# Patient Record
Sex: FEMALE | Race: BLACK OR AFRICAN AMERICAN | NOT HISPANIC OR LATINO | Employment: FULL TIME | URBAN - METROPOLITAN AREA
[De-identification: names, ages, dates, MRNs, and addresses within clinical notes are randomized per-mention and may not be internally consistent; named-entity substitution may affect disease eponyms.]

---

## 2017-01-05 ENCOUNTER — APPOINTMENT (OUTPATIENT)
Dept: RADIOLOGY | Facility: MEDICAL CENTER | Age: 22
End: 2017-01-05
Attending: NURSE PRACTITIONER
Payer: COMMERCIAL

## 2017-01-18 ENCOUNTER — APPOINTMENT (OUTPATIENT)
Dept: RADIOLOGY | Facility: MEDICAL CENTER | Age: 22
End: 2017-01-18
Attending: NURSE PRACTITIONER
Payer: COMMERCIAL

## 2017-03-29 ENCOUNTER — OFFICE VISIT (OUTPATIENT)
Dept: MEDICAL GROUP | Facility: CLINIC | Age: 22
End: 2017-03-29
Payer: COMMERCIAL

## 2017-03-29 VITALS
DIASTOLIC BLOOD PRESSURE: 68 MMHG | HEART RATE: 86 BPM | TEMPERATURE: 99 F | SYSTOLIC BLOOD PRESSURE: 108 MMHG | RESPIRATION RATE: 16 BRPM | OXYGEN SATURATION: 91 % | HEIGHT: 64 IN | BODY MASS INDEX: 44.05 KG/M2 | WEIGHT: 258 LBS

## 2017-03-29 DIAGNOSIS — J06.9 UPPER RESPIRATORY TRACT INFECTION, UNSPECIFIED TYPE: ICD-10-CM

## 2017-03-29 DIAGNOSIS — J40 BRONCHITIS: ICD-10-CM

## 2017-03-29 DIAGNOSIS — J45.21 MILD INTERMITTENT ASTHMA WITH ACUTE EXACERBATION: ICD-10-CM

## 2017-03-29 PROCEDURE — 99213 OFFICE O/P EST LOW 20 MIN: CPT | Performed by: NURSE PRACTITIONER

## 2017-03-29 RX ORDER — FLUTICASONE PROPIONATE 50 MCG
2 SPRAY, SUSPENSION (ML) NASAL DAILY
Qty: 16 G | Refills: 1 | Status: SHIPPED | OUTPATIENT
Start: 2017-03-29 | End: 2017-08-16

## 2017-03-29 RX ORDER — PROMETHAZINE HYDROCHLORIDE AND CODEINE PHOSPHATE 6.25; 1 MG/5ML; MG/5ML
5 SYRUP ORAL NIGHTLY PRN
Qty: 120 ML | Refills: 0 | Status: SHIPPED | OUTPATIENT
Start: 2017-03-29 | End: 2017-05-30

## 2017-03-29 RX ORDER — AZITHROMYCIN 250 MG/1
TABLET, FILM COATED ORAL
Qty: 6 TAB | Refills: 0 | Status: SHIPPED | OUTPATIENT
Start: 2017-03-29 | End: 2017-06-08

## 2017-03-29 RX ORDER — ALBUTEROL SULFATE 90 UG/1
2 AEROSOL, METERED RESPIRATORY (INHALATION) EVERY 6 HOURS PRN
Qty: 8.5 G | Refills: 0 | Status: SHIPPED | OUTPATIENT
Start: 2017-03-29 | End: 2017-08-16

## 2017-03-29 ASSESSMENT — ENCOUNTER SYMPTOMS
SHORTNESS OF BREATH: 1
CHILLS: 1
COUGH: 1
MYALGIAS: 1
SORE THROAT: 1
WHEEZING: 1
NAUSEA: 1
VOMITING: 0
FEVER: 1
SPUTUM PRODUCTION: 0

## 2017-03-29 NOTE — MR AVS SNAPSHOT
"        Mary Carmen Sanchez   3/29/2017 11:00 AM   Office Visit   MRN: [id number]    Department:  [facility]   Dept Phone:  [phone]    Description:  Female : 1995   Provider:  KRISTA Ortiz           Reason for Visit     URI chills/ fever/ non- productive cough/ vomiting/ BRB with phlem/ x 4 days       Allergies as of 3/29/2017     No Known Allergies      You were diagnosed with     Upper respiratory tract infection, unspecified type   [6459282]       Mild intermittent asthma with acute exacerbation   [820017]       Bronchitis   [079848]         Vital Signs     Blood Pressure Pulse Temperature Respirations Height Weight    108/68 mmHg 86 37.2 °C (99 °F) 16 1.626 m (5' 4\") 117.028 kg (258 lb)    Body Mass Index Oxygen Saturation Smoking Status             44.26 kg/m2 91% Never Smoker          Basic Information     Date Of Birth Sex Race Ethnicity Preferred Language    1995 Female Black or  Non- English      Problem List              ICD-10-CM Priority Class Noted - Resolved    Depression F32.9   2016 - Present    Pain at surgical site G89.18   2016 - Present    Mid back pain M54.9   2016 - Present    Epidermal cyst L72.0   2016 - Present    Obesity, morbid, BMI 40.0-49.9 (CMS-HCC) E66.01   2016 - Present      Health Maintenance        Date Due Completion Dates    IMM VARICELLA (CHICKENPOX) VACCINE (1 of 2 - 2 Dose Adolescent Series) 2009 ---    IMM HPV VACCINE (2 of 3 - Female 3 Dose Series) 2/3/2012 2011    IMM MENINGOCOCCAL VACCINE (MCV4) (2 of 2) 2/3/2012 2011    IMM INFLUENZA (1) 2016 ---    PAP SMEAR 2017 ---    IMM DTaP/Tdap/Td Vaccine (2 - Td) 2025            Current Immunizations     HPV 9-VALENT VACCINE (GARDASIL 9) 2011    Hepatitis A Vaccine, Ped/Adol 2/3/2003, 2002    Hepatitis B Vaccine Non-Recombivax (Ped/Adol) 2000, 9/3/1999, 1995    Meningococcal Conjugate Vaccine " MCV4 (Menactra) 12/9/2011    Tdap Vaccine 4/1/2015  7:09 AM      Below and/or attached are the medications your provider expects you to take. Review all of your home medications and newly ordered medications with your provider and/or pharmacist. Follow medication instructions as directed by your provider and/or pharmacist. Please keep your medication list with you and share with your provider. Update the information when medications are discontinued, doses are changed, or new medications (including over-the-counter products) are added; and carry medication information at all times in the event of emergency situations     Allergies:  No Known Allergies          Medications  Valid as of: March 29, 2017 - 11:30 AM    Generic Name Brand Name Tablet Size Instructions for use    Albuterol Sulfate (Aero Soln) albuterol 108 (90 BASE) MCG/ACT Inhale 2 Puffs by mouth every 6 hours as needed.        Azithromycin (Tab) ZITHROMAX 250 MG As directed        Fluticasone Propionate (Suspension) FLONASE 50 MCG/ACT Spray 2 Sprays in nose every day.        Polyethylene Glycol 3350 (Pack) MIRALAX  Take 1 Packet by mouth every day.        Promethazine-Codeine (Syrup) PHENERGAN-CODEINE 6.25-10 MG/5ML Take 5 mL by mouth at bedtime as needed for Cough.        .                 Medicines prescribed today were sent to:     [facility] [address] [place] [address] [address] [facility] [address]    [address] [facility] [address]    Phone: [phone] Fax: [phone]    Open 24 Hours?: No      Medication refill instructions:       If your prescription bottle indicates you have medication refills left, it is not necessary to call your provider’s office. Please contact your pharmacy and they will refill your medication.    If your prescription bottle indicates you do not have any refills left, you may request refills at any time through one of the following ways: The online [employer] system (except Urgent Care), by calling  your provider’s office, or by asking your pharmacy to contact your provider’s office with a refill request. Medication refills are processed only during regular business hours and may not be available until the next business day. Your provider may request additional information or to have a follow-up visit with you prior to refilling your medication.   *Please Note: Medication refills are assigned a new Rx number when refilled electronically. Your pharmacy may indicate that no refills were authorized even though a new prescription for the same medication is available at the pharmacy. Please request the medicine by name with the pharmacy before contacting your provider for a refill.           MyChart Status: Patient Declined

## 2017-03-29 NOTE — PROGRESS NOTES
Chief Complaint   Patient presents with   • URI     chills/ fever/ non- productive cough/ vomiting/ BRB with phlem/ x 4 days        HISTORY OF PRESENT ILLNESS: Patient is a 22 y.o. female established patient who presents today due to 4 days hx of URI symptoms. Pt reports that she started having sore throat then coughing, mostly dry coughing at this time. She has fever last night up to 102. She feels nausea due to severe coughing. She also feels sob, wheezing and earache and muscle ache. She is taking ibuprofen or tylenol for headache secondary to severe coughing and nyquil at night time to help her sleep. However, none of them seems to help her too much. Pt reports that she has hx of asthma but she does not get asthma attack very often. Last time might be years ago. She is not smoking.      Patient Active Problem List    Diagnosis Date Noted   • Obesity, morbid, BMI 40.0-49.9 (CMS-[facility]) 12/28/2016   • Epidermal cyst 09/20/2016   • Depression 09/06/2016   • Pain at surgical site 09/06/2016   • Mid back pain 09/06/2016       Allergies:Review of patient's allergies indicates no known allergies.    Current Outpatient Prescriptions   Medication Sig Dispense Refill   • fluticasone (FLONASE) 50 MCG/ACT nasal spray Spray 2 Sprays in nose every day. 16 g 1   • nitrofurantoin macro crystals (MACRODANTIN) 100 MG Cap Take 1 Cap by mouth 2 Times a Day. 10 Cap 0   • promethazine-codeine (PHENERGAN-CODEINE) 6.25-10 MG/5ML Syrup Take 5 mL by mouth 4 times a day as needed for Cough. 120 mL 0   • phenazopyridine (PYRIDIUM) 200 MG Tab Take 1 Tab by mouth 3 times a day as needed. 9 Tab 0   • polyethylene glycol/lytes (MIRALAX) Pack Take 1 Packet by mouth every day. 7 Each 0     No current facility-administered medications for this visit.       Social History   Substance Use Topics   • Smoking status: Never Smoker    • Smokeless tobacco: Never Used   • Alcohol Use: No       Family Status   Relation Status Death Age   • Daughter Alive   "  • Daughter Alive      Family History   Problem Relation Age of Onset   • Diabetes Father    • Diabetes Paternal Uncle    • Cancer Maternal Grandfather    • No Known Problems Daughter    • No Known Problems Daughter          ROS:  Review of Systems   Constitutional: Positive for fever and chills.   HENT: Positive for congestion, ear pain and sore throat.    Respiratory: Positive for cough ( mostly dry cough), shortness of breath and wheezing. Negative for sputum production.    Gastrointestinal: Positive for nausea ( from severe coughing). Negative for vomiting.   Musculoskeletal: Positive for myalgias.        Objective:     Blood pressure 108/68, pulse 86, temperature 37.2 °C (99 °F), resp. rate 16, height 1.626 m (5' 4\"), weight 117.028 kg (258 lb), SpO2 91 %, unknown if currently breastfeeding.     Physical Exam:  Physical Exam   Constitutional: She is oriented to person, place, and time and well-developed, well-nourished, and in no distress.   HENT:   Head: Normocephalic and atraumatic.   Right Ear: External ear normal. Tympanic membrane is bulging.   Left Ear: External ear normal. Tympanic membrane is bulging.   Nose: Rhinorrhea present. Right sinus exhibits frontal sinus tenderness. Right sinus exhibits no maxillary sinus tenderness. Left sinus exhibits frontal sinus tenderness. Left sinus exhibits no maxillary sinus tenderness.   Mouth/Throat: Oropharynx is clear and moist. No oropharyngeal exudate, posterior oropharyngeal edema, posterior oropharyngeal erythema or tonsillar abscesses.   Eyes: Conjunctivae are normal.   Neck: Neck supple. No JVD present.   Cardiovascular: Normal rate.    Pulmonary/Chest: Effort normal. No respiratory distress. She has wheezes. She has no rales.   Musculoskeletal: Normal range of motion. She exhibits no edema.   Neurological: She is alert and oriented to person, place, and time.   Skin: Skin is warm. No erythema.   Vitals reviewed.        Assessment/Plan:  1. Upper respiratory " tract infection, unspecified type  - fluticasone (FLONASE) 50 MCG/ACT nasal spray; Spray 2 Sprays in nose every day.  Dispense: 16 g; Refill: 1  - promethazine-codeine (PHENERGAN-CODEINE) 6.25-10 MG/5ML Syrup; Take 5 mL by mouth at bedtime as needed for Cough.  Dispense: 120 mL; Refill: 0  - azithromycin (ZITHROMAX) 250 MG Tab; As directed  Dispense: 6 Tab; Refill: 0    2. Mild intermittent asthma with acute exacerbation  - albuterol 108 (90 BASE) MCG/ACT Aero Soln inhalation aerosol; Inhale 2 Puffs by mouth every 6 hours as needed.  Dispense: 8.5 g; Refill: 0    3. Bronchitis  - promethazine-codeine (PHENERGAN-CODEINE) 6.25-10 MG/5ML Syrup; Take 5 mL by mouth at bedtime as needed for Cough.  Dispense: 120 mL; Refill: 0  - azithromycin (ZITHROMAX) 250 MG Tab; As directed  Dispense: 6 Tab; Refill: 0  - albuterol 108 (90 BASE) MCG/ACT Aero Soln inhalation aerosol; Inhale 2 Puffs by mouth every 6 hours as needed.  Dispense: 8.5 g; Refill: 0

## 2017-04-05 ENCOUNTER — APPOINTMENT (OUTPATIENT)
Dept: RADIOLOGY | Facility: MEDICAL CENTER | Age: 22
End: 2017-04-05
Attending: EMERGENCY MEDICINE
Payer: COMMERCIAL

## 2017-04-05 ENCOUNTER — HOSPITAL ENCOUNTER (EMERGENCY)
Facility: MEDICAL CENTER | Age: 22
End: 2017-04-05
Attending: EMERGENCY MEDICINE
Payer: COMMERCIAL

## 2017-04-05 ENCOUNTER — APPOINTMENT (OUTPATIENT)
Dept: RADIOLOGY | Facility: MEDICAL CENTER | Age: 22
End: 2017-04-05
Payer: COMMERCIAL

## 2017-04-05 VITALS
OXYGEN SATURATION: 97 % | SYSTOLIC BLOOD PRESSURE: 120 MMHG | RESPIRATION RATE: 18 BRPM | DIASTOLIC BLOOD PRESSURE: 85 MMHG | TEMPERATURE: 97 F | BODY MASS INDEX: 43.41 KG/M2 | HEART RATE: 96 BPM | WEIGHT: 253 LBS

## 2017-04-05 DIAGNOSIS — S93.401A SPRAIN OF RIGHT ANKLE, UNSPECIFIED LIGAMENT, INITIAL ENCOUNTER: ICD-10-CM

## 2017-04-05 PROCEDURE — 99284 EMERGENCY DEPT VISIT MOD MDM: CPT

## 2017-04-05 PROCEDURE — 700102 HCHG RX REV CODE 250 W/ 637 OVERRIDE(OP): Performed by: EMERGENCY MEDICINE

## 2017-04-05 PROCEDURE — A9270 NON-COVERED ITEM OR SERVICE: HCPCS | Performed by: EMERGENCY MEDICINE

## 2017-04-05 PROCEDURE — 73610 X-RAY EXAM OF ANKLE: CPT | Mod: RT

## 2017-04-05 PROCEDURE — 73630 X-RAY EXAM OF FOOT: CPT | Mod: RT

## 2017-04-05 RX ORDER — IBUPROFEN 600 MG/1
600 TABLET ORAL ONCE
Status: COMPLETED | OUTPATIENT
Start: 2017-04-05 | End: 2017-04-05

## 2017-04-05 RX ORDER — HYDROCODONE BITARTRATE AND ACETAMINOPHEN 5; 325 MG/1; MG/1
1-2 TABLET ORAL EVERY 6 HOURS PRN
Qty: 20 TAB | Refills: 0 | Status: SHIPPED | OUTPATIENT
Start: 2017-04-05 | End: 2017-05-30

## 2017-04-05 RX ORDER — HYDROCODONE BITARTRATE AND ACETAMINOPHEN 5; 325 MG/1; MG/1
1 TABLET ORAL ONCE
Status: COMPLETED | OUTPATIENT
Start: 2017-04-05 | End: 2017-04-05

## 2017-04-05 RX ADMIN — HYDROCODONE BITARTRATE AND ACETAMINOPHEN 1 TABLET: 5; 325 TABLET ORAL at 18:58

## 2017-04-05 RX ADMIN — IBUPROFEN 600 MG: 600 TABLET, FILM COATED ORAL at 18:58

## 2017-04-05 ASSESSMENT — PAIN SCALES - GENERAL: PAINLEVEL_OUTOF10: 8

## 2017-04-05 NOTE — ED AVS SNAPSHOT
4/5/2017          Mary Carmen Sanchez  [address]   [place] [address]    Dear Mary Carmen:    [facility] wants to ensure your discharge home is safe and you or your loved ones have had all your questions answered regarding your care after you leave the hospital.    You may receive a telephone call within two days of your discharge.  This call is to make certain you understand your discharge instructions as well as ensure we provided you with the best care possible during your stay with us.     The call will only last approximately 3-5 minutes and will be done by a nurse.    Once again, we want to ensure your discharge home is safe and that you have a clear understanding of any next steps in your care.  If you have any questions or concerns, please do not hesitate to contact us, we are here for you.  Thank you for choosing [facility] for your healthcare needs.    Sincerely,    Barry Black    [facility]

## 2017-04-05 NOTE — ED AVS SNAPSHOT
[employer] Works Access Code: Activation code not generated  Current [employer] Works Status: Patient Declined    Yield[employer]arZeptor  A secure, online tool to manage your health information     [employer]’s [employer]® is a secure, online tool that connects you to your personalized health information from the privacy of your home -- day or night - making it very easy for you to manage your healthcare. Once the activation process is completed, you can even access your medical information using the Entertainment [employer] Works amish, which is available for free in the Apple Amish store or Google Play store.     Enter[employer] Works provides the following levels of access (as shown below):   My Chart Features   [facility] Primary Care Doctor [facility]  Specialists [facility]  Urgent  Care Non-[facility]  Primary Care  Doctor   Email your healthcare team securely and privately 24/7 X X X X   Manage appointments: schedule your next appointment; view details of past/upcoming appointments X      Request prescription refills. X      View recent personal medical records, including lab and immunizations X X X X   View health record, including health history, allergies, medications X X X X   Read reports about your outpatient visits, procedures, consult and ER notes X X X X   See your discharge summary, which is a recap of your hospital and/or ER visit that includes your diagnosis, lab results, and care plan. X X       How to register for [employer] Works:  1. Go to  [email or web address].  2. Click on the Sign Up Now box, which takes you to the New Member Sign Up page. You will need to provide the following information:  a. Enter your [employer] Works Access Code exactly as it appears at the top of this page. (You will not need to use this code after you’ve completed the sign-up process. If you do not sign up before the expiration date, you must request a new code.)   b. Enter your date of birth.   c. Enter your home email address.   d. Click Submit, and follow the next screen’s instructions.  3. Create a [employer] Works ID.  This will be your MIGSIF login ID and cannot be changed, so think of one that is secure and easy to remember.  4. Create a MIGSIF password. You can change your password at any time.  5. Enter your Password Reset Question and Answer. This can be used at a later time if you forget your password.   6. Enter your e-mail address. This allows you to receive e-mail notifications when new information is available in MIGSIF.  7. Click Sign Up. You can now view your health information.    For assistance activating your MIGSIF account, call [phone]

## 2017-04-06 NOTE — DISCHARGE INSTRUCTIONS
Please follow-up with your primary care provider for blood pressure management.      Ankle Sprain  An ankle sprain is an injury to the strong, fibrous tissues (ligaments) that hold your ankle bones together.   HOME CARE   · Put ice on your ankle for 1-2 days or as told by your doctor.  ¨ Put ice in a plastic bag.  ¨ Place a towel between your skin and the bag.  ¨ Leave the ice on for 15-20 minutes at a time, every 2 hours while you are awake.  · Only take medicine as told by your doctor.  · Raise (elevate) your injured ankle above the level of your heart as much as possible for 2-3 days.  · Use crutches if your doctor tells you to. Slowly put your own weight on the affected ankle. Use the crutches until you can walk without pain.  · If you have a plaster splint:  ¨ Do not rest it on anything harder than a pillow for 24 hours.  ¨ Do not put weight on it.  ¨ Do not get it wet.  ¨ Take it off to shower or bathe.  · If given, use an elastic wrap or support stocking for support. Take the wrap off if your toes lose feeling (numb), tingle, or turn cold or blue.  · If you have an air splint:  ¨ Add or let out air to make it comfortable.  ¨ Take it off at night and to shower and bathe.  ¨ Wiggle your toes and move your ankle up and down often while you are wearing it.  GET HELP IF:  · You have rapidly increasing bruising or puffiness (swelling).  · Your toes feel very cold.  · You lose feeling in your foot.  · Your medicine does not help your pain.  GET HELP RIGHT AWAY IF:   · Your toes lose feeling (numb) or turn blue.  · You have severe pain that is increasing.  MAKE SURE YOU:   · Understand these instructions.  · Will watch your condition.  · Will get help right away if you are not doing well or get worse.     This information is not intended to replace advice given to you by your health care provider. Make sure you discuss any questions you have with your health care provider.     Document Released: 06/05/2009 Document  Revised: 01/08/2016 Document Reviewed: 07/01/2013  Elsevier Interactive Patient Education ©2016 Elsevier Inc.

## 2017-04-06 NOTE — ED PROVIDER NOTES
ED Provider Note    CHIEF COMPLAINT   Chief Complaint   Patient presents with   • Ankle Injury     Right, stepped off stairs wrong       HPI   Mary Carmen Sanchez is a 22 y.o. female who presents to the ED secondary to right ankle pain. The patient states that she twisted her ankle after coming downstairs. She has had severe pain over lateral aspect of her ankle, she felt a pop. She did not hit her head, no other injuries. She denies any chance of pregnancy.    REVIEW OF SYSTEMS   See HPI for further details.     PAST MEDICAL HISTORY   Past Medical History   Diagnosis Date   • Anxiety    • Depression        FAMILY HISTORY  Family History   Problem Relation Age of Onset   • Diabetes Father    • Diabetes Paternal Uncle    • Cancer Maternal Grandfather    • No Known Problems Daughter    • No Known Problems Daughter        SOCIAL HISTORY  Social History     Social History   • Marital Status: Single     Spouse Name: N/A   • Number of Children: N/A   • Years of Education: N/A     Social History Main Topics   • Smoking status: Never Smoker    • Smokeless tobacco: Never Used   • Alcohol Use: No   • Drug Use: No   • Sexual Activity:     Partners: Male     Birth Control/ Protection: IUD     Other Topics Concern   • None     Social History Narrative       SURGICAL HISTORY  Past Surgical History   Procedure Laterality Date   • Tonsillectomy  2000   • Cervical cerclage  2/20/2015     Performed by Quinn Grimaldo M.D. at [facility]   • Other       wisdom teeth   • Primary c section  3/28/2015     Performed by Lucy Valdez M.D. at [facility]       CURRENT MEDICATIONS   Home Medications     **Home medications have not yet been reviewed for this encounter**          ALLERGIES   No Known Allergies    PHYSICAL EXAM  VITAL SIGNS: /60 mmHg  Pulse 81  Temp(Src) 37.2 °C (99 °F)  Resp 20  Wt 114.76 kg (253 lb)  SpO2 97%  Constitutional: Well developed, Well nourished, mild distress, Non-toxic appearance.    HENT:  Atraumatic, Normocephalic, Oral pharynx with moist mucous membranes.   Eyes: EOMI, PERRL  Cardiovascular: Good Pulses  Thorax & Lungs: No respiratory distress  Abdomen: Soft nontender   Skin: Warm, Dry, No erythema, No rash.   Musculoskeletal: There is palpation soft tissue swelling over lateral aspect of the right ankle, 2+ pulse, normal sensation distally  Neurologic: Alert & oriented x 3,     RADIOLOGY/PROCEDURES  DX-FOOT-COMPLETE 3+ RIGHT   Final Result      Unremarkable RIGHT foot.      DX-ANKLE 3+ VIEWS RIGHT   Final Result      1.  Mild diffuse RIGHT ankle swelling.   2.  No fracture or dislocation.            COURSE & MEDICAL DECISION MAKING  Pertinent Labs & Imaging studies reviewed. (See chart for details)  Patient with ankle sprain, we'll put air splint, crutches, Norco for pain, follow-up with orthopedics, Dr. Sellers, return with worsening symptoms.    FINAL IMPRESSION  1. Sprain of right ankle, unspecified ligament, initial encounter        Patient referred to primary care provider for blood pressure management     This dictation was created using voice recognition software. The accuracy of the dictation is limited to the abilities of the software. I expect there may be some errors of grammar and possibly content. The nursing notes were reviewed and certain aspects of this information were incorporated into this note.    Electronically signed by: Sandip Joyce, 4/5/2017 6:51 PM

## 2017-04-06 NOTE — ED NOTES
Chief Complaint   Patient presents with   • Ankle Injury     Right, stepped off stairs wrong     /60 mmHg  Pulse 81  Temp(Src) 37.2 °C (99 °F)  Resp 20  Wt 114.76 kg (253 lb)  SpO2 97%

## 2017-04-06 NOTE — ED NOTES
X-ray results noted-presumably from triage. Pt med per order along with crackers. Milk per pt request

## 2017-05-25 ENCOUNTER — TELEPHONE (OUTPATIENT)
Dept: MEDICAL GROUP | Facility: PHYSICIAN GROUP | Age: 22
End: 2017-05-25

## 2017-05-30 ENCOUNTER — OFFICE VISIT (OUTPATIENT)
Dept: URGENT CARE | Facility: PHYSICIAN GROUP | Age: 22
End: 2017-05-30
Payer: COMMERCIAL

## 2017-05-30 VITALS
RESPIRATION RATE: 16 BRPM | OXYGEN SATURATION: 98 % | TEMPERATURE: 98.1 F | SYSTOLIC BLOOD PRESSURE: 124 MMHG | HEART RATE: 96 BPM | DIASTOLIC BLOOD PRESSURE: 76 MMHG

## 2017-05-30 DIAGNOSIS — R05.9 COUGH: ICD-10-CM

## 2017-05-30 DIAGNOSIS — J01.00 ACUTE NON-RECURRENT MAXILLARY SINUSITIS: ICD-10-CM

## 2017-05-30 PROCEDURE — 99214 OFFICE O/P EST MOD 30 MIN: CPT | Performed by: PHYSICIAN ASSISTANT

## 2017-05-30 RX ORDER — DOXYCYCLINE HYCLATE 100 MG
100 TABLET ORAL 2 TIMES DAILY
Qty: 20 TAB | Refills: 0 | Status: SHIPPED | OUTPATIENT
Start: 2017-05-30 | End: 2017-06-29

## 2017-05-30 RX ORDER — CODEINE PHOSPHATE AND GUAIFENESIN 10; 100 MG/5ML; MG/5ML
SOLUTION ORAL
Qty: 100 ML | Refills: 0 | Status: SHIPPED | OUTPATIENT
Start: 2017-05-30 | End: 2017-06-08

## 2017-05-30 RX ORDER — METHYLPREDNISOLONE 4 MG/1
TABLET ORAL
Qty: 21 TAB | Refills: 0 | Status: SHIPPED | OUTPATIENT
Start: 2017-05-30 | End: 2017-06-08

## 2017-05-30 NOTE — MR AVS SNAPSHOT
Mary Carmen Sanchez   2017 9:35 AM   Office Visit   MRN: [id number]    Department:  [facility]   Dept Phone:  [phone]    Description:  Female : 1995   Provider:  Melida Kimbrough PA-C           Reason for Visit     Cough 2 weeks      Allergies as of 2017     No Known Allergies      You were diagnosed with     Acute non-recurrent maxillary sinusitis   [9023918]       Cough   [786.2.ICD-9-CM]         Vital Signs     Blood Pressure Pulse Temperature Respirations Oxygen Saturation Smoking Status    124/76 mmHg 96 36.7 °C (98.1 °F) 16 98% Never Smoker       Basic Information     Date Of Birth Sex Race Ethnicity Preferred Language    1995 Female Black or  Non- English      Your appointments     2017 11:40 AM   ANNUAL EXAM PREVENTATIVE with KRISTA Narvaez   [facility]    [address] [place] [id number] [phone]              Problem List              ICD-10-CM Priority Class Noted - Resolved    Depression F32.9   2016 - Present    Pain at surgical site G89.18   2016 - Present    Mid back pain M54.9   2016 - Present    Epidermal cyst L72.0   2016 - Present    Obesity, morbid, BMI 40.0-49.9 (CMS-HCC) E66.01   2016 - Present      Health Maintenance        Date Due Completion Dates    IMM VARICELLA (CHICKENPOX) VACCINE (1 of 2 - 2 Dose Adolescent Series) 2009 ---    IMM HPV VACCINE (2 of 3 - Female 3 Dose Series) 2/3/2012 2011    IMM MENINGOCOCCAL VACCINE (MCV4) (2 of 2) 2/3/2012 2011    PAP SMEAR 2017 ---    IMM DTaP/Tdap/Td Vaccine (2 - Td) 2025            Current Immunizations     HPV 9-VALENT VACCINE (GARDASIL 9) 2011    Hepatitis A Vaccine, Ped/Adol 2/3/2003, 2002    Hepatitis B Vaccine Non-Recombivax (Ped/Adol) 2000, 9/3/1999, 1996    Meningococcal Conjugate Vaccine MCV4 (Menactra) 2011    Tdap Vaccine 2015  7:09 AM      Below  and/or attached are the medications your provider expects you to take. Review all of your home medications and newly ordered medications with your provider and/or pharmacist. Follow medication instructions as directed by your provider and/or pharmacist. Please keep your medication list with you and share with your provider. Update the information when medications are discontinued, doses are changed, or new medications (including over-the-counter products) are added; and carry medication information at all times in the event of emergency situations     Allergies:  No Known Allergies          Medications  Valid as of: May 30, 2017 - 12:34 PM    Generic Name Brand Name Tablet Size Instructions for use    Albuterol Sulfate (Aero Soln) albuterol 108 (90 BASE) MCG/ACT Inhale 2 Puffs by mouth every 6 hours as needed.        Azithromycin (Tab) ZITHROMAX 250 MG As directed        Doxycycline Hyclate (Tab) VIBRAMYCIN 100 MG Take 1 Tab by mouth 2 times a day.        Fluticasone Propionate (Suspension) FLONASE 50 MCG/ACT Spray 2 Sprays in nose every day.        Guaifenesin-Codeine (Solution) ROBITUSSIN -10 mg/5mL 1-2 teaspoons at bedtime prn cough. No driving        MethylPREDNISolone (Tablet Therapy Pack) MEDROL DOSEPAK 4 MG Take as directed        Polyethylene Glycol 3350 (Pack) MIRALAX  Take 1 Packet by mouth every day.        .                 Medicines prescribed today were sent to:     [id number] - [place] - [address]    [address] [place] [address]    Phone: [phone] Fax: [phone]    Open 24 Hours?: No      Medication refill instructions:       If your prescription bottle indicates you have medication refills left, it is not necessary to call your provider’s office. Please contact your pharmacy and they will refill your medication.    If your prescription bottle indicates you do not have any refills left, you may request refills at any time through one of the following ways: The  online [employer] system (except Urgent Care), by calling your provider’s office, or by asking your pharmacy to contact your provider’s office with a refill request. Medication refills are processed only during regular business hours and may not be available until the next business day. Your provider may request additional information or to have a follow-up visit with you prior to refilling your medication.   *Please Note: Medication refills are assigned a new Rx number when refilled electronically. Your pharmacy may indicate that no refills were authorized even though a new prescription for the same medication is available at the pharmacy. Please request the medicine by name with the pharmacy before contacting your provider for a refill.           [employer] Access Code: [email or web address]  Expires: 6/17/2017  9:18 AM    [employer]  A secure, online tool to manage your health information     [employer]’s [employer]® is a secure, online tool that connects you to your personalized health information from the privacy of your home -- day or night - making it very easy for you to manage your healthcare. Once the activation process is completed, you can even access your medical information using the [employer] amish, which is available for free in the Apple Amish store or Google Play store.     [employer] provides the following levels of access (as shown below):   My Chart Features   [facility] Doctor [facility]  Specialists [facility]  Urgent  Care Non-[facility]  Primary Care  Doctor   Email your healthcare team securely and privately 24/7 X X X    Manage appointments: schedule your next appointment; view details of past/upcoming appointments X      Request prescription refills. X      View recent personal medical records, including lab and immunizations X X X X   View health record, including health history, allergies, medications X X X X   Read reports about your outpatient visits, procedures, consult and ER notes X X X X   See your  discharge summary, which is a recap of your hospital and/or ER visit that includes your diagnosis, lab results, and care plan. X X       How to register for [employer]:  1. Go to  [email or web address].  2. Click on the Sign Up Now box, which takes you to the New Member Sign Up page. You will need to provide the following information:  a. Enter your [employer] Access Code exactly as it appears at the top of this page. (You will not need to use this code after you’ve completed the sign-up process. If you do not sign up before the expiration date, you must request a new code.)   b. Enter your date of birth.   c. Enter your home email address.   d. Click Submit, and follow the next screen’s instructions.  3. Create a [employer] M[employer] ID. This will be your [employer] login ID and cannot be changed, so think of one that is secure and easy to remember.  4. Create a [employer] password. You can change your password at any time.  5. Enter your Password Reset Question and Answer. This can be used at a later time if you forget your password.   6. Enter your e-mail address. This allows you to receive e-mail notifications when new information is available in Asset Mapping.  7. Click Sign Up. You can now view your health information.    For assistance activating your [employer] M[employer] account, call [phone]

## 2017-06-08 ENCOUNTER — HOSPITAL ENCOUNTER (OUTPATIENT)
Facility: MEDICAL CENTER | Age: 22
End: 2017-06-08
Attending: NURSE PRACTITIONER
Payer: COMMERCIAL

## 2017-06-08 ENCOUNTER — OFFICE VISIT (OUTPATIENT)
Dept: MEDICAL GROUP | Facility: PHYSICIAN GROUP | Age: 22
End: 2017-06-08
Payer: COMMERCIAL

## 2017-06-08 VITALS
BODY MASS INDEX: 43.71 KG/M2 | HEART RATE: 79 BPM | SYSTOLIC BLOOD PRESSURE: 84 MMHG | HEIGHT: 64 IN | WEIGHT: 256 LBS | TEMPERATURE: 97 F | DIASTOLIC BLOOD PRESSURE: 56 MMHG | OXYGEN SATURATION: 95 % | RESPIRATION RATE: 12 BRPM

## 2017-06-08 DIAGNOSIS — F32.9 REACTIVE DEPRESSION: ICD-10-CM

## 2017-06-08 DIAGNOSIS — Z11.51 ENCOUNTER FOR SCREENING FOR HUMAN PAPILLOMAVIRUS (HPV): ICD-10-CM

## 2017-06-08 DIAGNOSIS — N89.8 VAGINAL DISCHARGE: ICD-10-CM

## 2017-06-08 DIAGNOSIS — Z01.419 WELL FEMALE EXAM WITH ROUTINE GYNECOLOGICAL EXAM: ICD-10-CM

## 2017-06-08 DIAGNOSIS — E66.01 OBESITY, MORBID, BMI 40.0-49.9 (HCC): ICD-10-CM

## 2017-06-08 DIAGNOSIS — Z12.4 SCREENING FOR MALIGNANT NEOPLASM OF CERVIX: ICD-10-CM

## 2017-06-08 DIAGNOSIS — Z11.3 SCREEN FOR STD (SEXUALLY TRANSMITTED DISEASE): ICD-10-CM

## 2017-06-08 PROCEDURE — 88175 CYTOPATH C/V AUTO FLUID REDO: CPT

## 2017-06-08 PROCEDURE — 87660 TRICHOMONAS VAGIN DIR PROBE: CPT

## 2017-06-08 PROCEDURE — 87510 GARDNER VAG DNA DIR PROBE: CPT

## 2017-06-08 PROCEDURE — 99395 PREV VISIT EST AGE 18-39: CPT | Performed by: NURSE PRACTITIONER

## 2017-06-08 PROCEDURE — 87480 CANDIDA DNA DIR PROBE: CPT

## 2017-06-08 PROCEDURE — 87591 N.GONORRHOEAE DNA AMP PROB: CPT

## 2017-06-08 PROCEDURE — 87491 CHLMYD TRACH DNA AMP PROBE: CPT

## 2017-06-08 PROCEDURE — 87624 HPV HI-RISK TYP POOLED RSLT: CPT

## 2017-06-08 RX ORDER — HYDROCODONE BITARTRATE AND ACETAMINOPHEN 5; 325 MG/1; MG/1
TABLET ORAL
Refills: 0 | COMMUNITY
Start: 2017-04-05 | End: 2017-06-29

## 2017-06-08 RX ORDER — CITALOPRAM HYDROBROMIDE 10 MG/1
10 TABLET ORAL DAILY
Qty: 30 TAB | Refills: 0 | Status: SHIPPED | OUTPATIENT
Start: 2017-06-08 | End: 2017-06-29

## 2017-06-08 ASSESSMENT — PATIENT HEALTH QUESTIONNAIRE - PHQ9
8. MOVING OR SPEAKING SO SLOWLY THAT OTHER PEOPLE COULD HAVE NOTICED. OR THE OPPOSITE, BEING SO FIGETY OR RESTLESS THAT YOU HAVE BEEN MOVING AROUND A LOT MORE THAN USUAL: 3
SUM OF ALL RESPONSES TO PHQ9 QUESTIONS 1 AND 2: 6
5. POOR APPETITE OR OVEREATING: 3
7. TROUBLE CONCENTRATING ON THINGS, SUCH AS READING THE NEWSPAPER OR WATCHING TELEVISION: 3
SUM OF ALL RESPONSES TO PHQ QUESTIONS 1-9: 25
4. FEELING TIRED OR HAVING LITTLE ENERGY: 3
3. TROUBLE FALLING OR STAYING ASLEEP OR SLEEPING TOO MUCH: 3
2. FEELING DOWN, DEPRESSED, IRRITABLE, OR HOPELESS: 3
6. FEELING BAD ABOUT YOURSELF - OR THAT YOU ARE A FAILURE OR HAVE LET YOURSELF OR YOUR FAMILY DOWN: 3
9. THOUGHTS THAT YOU WOULD BE BETTER OFF DEAD, OR OF HURTING YOURSELF: 1
1. LITTLE INTEREST OR PLEASURE IN DOING THINGS: 3

## 2017-06-08 NOTE — MR AVS SNAPSHOT
"        Mary Carmen Sanchez   2017 11:40 AM   Office Visit   MRN: [id number]    Department:  [facility]   Dept Phone:  [phone]    Description:  Female : 1995   Provider:  KRISTA Narvaez           Reason for Visit     Annual Exam           Allergies as of 2017     No Known Allergies      You were diagnosed with     Well female exam with routine gynecological exam   [270479]       Screening for malignant neoplasm of cervix   [003693]       Encounter for screening for human papillomavirus (HPV)   [008174]       Reactive depression   [021090]       Obesity, morbid, BMI 40.0-49.9 (CMS-[facility])   [080594]       Screen for STD (sexually transmitted disease)   [549759]         Vital Signs     Blood Pressure Pulse Temperature Respirations Height Weight    84/56 mmHg 79 36.1 °C (97 °F) 12 1.626 m (5' 4.02\") 116.121 kg (256 lb)    Body Mass Index Oxygen Saturation Smoking Status             43.92 kg/m2 95% Never Smoker          Basic Information     Date Of Birth Sex Race Ethnicity Preferred Language    1995 Female Black or  Non- English      Problem List              ICD-10-CM Priority Class Noted - Resolved    Depression F32.9   2016 - Present    Pain at surgical site G89.18   2016 - Present    Mid back pain M54.9   2016 - Present    Epidermal cyst L72.0   2016 - Present    Obesity, morbid, BMI 40.0-49.9 (CMS-[facility]) E66.01   2016 - Present      Health Maintenance        Date Due Completion Dates    IMM VARICELLA (CHICKENPOX) VACCINE (1 of 2 - 2 Dose Adolescent Series) 2009 ---    IMM HPV VACCINE (2 of 3 - Female 3 Dose Series) 2/3/2012 2011    IMM MENINGOCOCCAL VACCINE (MCV4) (2 of 2) 2/3/2012 2011    PAP SMEAR 2017 ---    IMM DTaP/Tdap/Td Vaccine (2 - Td) 2025            Current Immunizations     HPV 9-VALENT VACCINE (GARDASIL 9) 2011    Hepatitis A Vaccine, Ped/Adol 2/3/2003, 2002    Hepatitis B Vaccine " Non-Recombivax (Ped/Adol) 5/11/2000, 9/3/1999, 1996    Meningococcal Conjugate Vaccine MCV4 (Menactra) 12/9/2011    Tdap Vaccine 4/1/2015  7:09 AM      Below and/or attached are the medications your provider expects you to take. Review all of your home medications and newly ordered medications with your provider and/or pharmacist. Follow medication instructions as directed by your provider and/or pharmacist. Please keep your medication list with you and share with your provider. Update the information when medications are discontinued, doses are changed, or new medications (including over-the-counter products) are added; and carry medication information at all times in the event of emergency situations     Allergies:  No Known Allergies          Medications  Valid as of: June 08, 2017 - 12:48 PM    Generic Name Brand Name Tablet Size Instructions for use    Albuterol Sulfate (Aero Soln) albuterol 108 (90 BASE) MCG/ACT Inhale 2 Puffs by mouth every 6 hours as needed.        Citalopram Hydrobromide (Tab) CELEXA 10 MG Take 1 Tab by mouth every day.        Doxycycline Hyclate (Tab) VIBRAMYCIN 100 MG Take 1 Tab by mouth 2 times a day.        Fluticasone Propionate (Suspension) FLONASE 50 MCG/ACT Spray 2 Sprays in nose every day.        Hydrocodone-Acetaminophen (Tab) NORCO 5-325 MG TAKE 1 TO 2 TABLETS ORALLY EVERY 6 HOURS AS NEEDED        Polyethylene Glycol 3350 (Pack) MIRALAX  Take 1 Packet by mouth every day.        .                 Medicines prescribed today were sent to:     [facility][id number] - [place] - [address]    [address] [place] [address]    Phone: [phone] Fax: [phone]    Open 24 Hours?: No      Medication refill instructions:       If your prescription bottle indicates you have medication refills left, it is not necessary to call your provider’s office. Please contact your pharmacy and they will refill your medication.    If your prescription bottle indicates you do not  have any refills left, you may request refills at any time through one of the following ways: The online [employer] system (except Urgent Care), by calling your provider’s office, or by asking your pharmacy to contact your provider’s office with a refill request. Medication refills are processed only during regular business hours and may not be available until the next business day. Your provider may request additional information or to have a follow-up visit with you prior to refilling your medication.   *Please Note: Medication refills are assigned a new Rx number when refilled electronically. Your pharmacy may indicate that no refills were authorized even though a new prescription for the same medication is available at the pharmacy. Please request the medicine by name with the pharmacy before contacting your provider for a refill.        Your To Do List     Future Labs/Procedures Complete By Expires    HIV ANTIBODIES  As directed 6/8/2018         [employer] Access Code: [email or web address]  Expires: 6/17/2017  9:18 AM    [employer]  A secure, online tool to manage your health information     [employer]’s [employer]® is a secure, online tool that connects you to your personalized health information from the privacy of your home -- day or night - making it very easy for you to manage your healthcare. Once the activation process is completed, you can even access your medical information using the [employer] amish, which is available for free in the Apple Amish store or Google Play store.     [employer] provides the following levels of access (as shown below):   My Chart Features   [facility] Doctor [facility]  Specialists [facility]  Urgent  Care Non-[facility]  Primary Care  Doctor   Email your healthcare team securely and privately 24/7 X X X    Manage appointments: schedule your next appointment; view details of past/upcoming appointments X      Request prescription refills. X      View recent personal medical records, including lab  and immunizations X X X X   View health record, including health history, allergies, medications X X X X   Read reports about your outpatient visits, procedures, consult and ER notes X X X X   See your discharge summary, which is a recap of your hospital and/or ER visit that includes your diagnosis, lab results, and care plan. X X       How to register for PENRITH:  1. Go to  [email or web address].  2. Click on the Sign Up Now box, which takes you to the New Member Sign Up page. You will need to provide the following information:  a. Enter your PENRITH Access Code exactly as it appears at the top of this page. (You will not need to use this code after you’ve completed the sign-up process. If you do not sign up before the expiration date, you must request a new code.)   b. Enter your date of birth.   c. Enter your home email address.   d. Click Submit, and follow the next screen’s instructions.  3. Create a PENRITH ID. This will be your PENRITH login ID and cannot be changed, so think of one that is secure and easy to remember.  4. Create a PENRITH password. You can change your password at any time.  5. Enter your Password Reset Question and Answer. This can be used at a later time if you forget your password.   6. Enter your e-mail address. This allows you to receive e-mail notifications when new information is available in PENRITH.  7. Click Sign Up. You can now view your health information.    For assistance activating your PENRITH account, call [phone]

## 2017-06-08 NOTE — Clinical Note
June 8, 2017          Dear Mary Carmen:    I have started you on citalopram a medication to help with your mood. In 10-21 days, you may see improvement in sleep, appetite, and daytime energy levels. In 6 weeks, you should start to see improvement in mood and emotion control.  In the first 1-2 weeks you may experience flu-like symptoms of nausea, headache, diarrhea. This is your body's response to becoming used to the change in serotonin levels. These symptoms most often subside after 1-2 weeks. If you develop severe diarrhea, vomiting, or severe headache, please let me know.  It is very important to not discontinue the medication in the initial 6 weeks. If you do not see improvement in 6 weeks, we can discuss increasing the dose or changing the medication.   The length of time we will treat you with this will be for a period of 6 months beyond the point of symptomatic treatment.   Antidepressants are not addictive. They do not cause tolerance; you will not require higher and higher doses once an adequate dose for you is determined.   You should not drink alcohol while taking antidepressants. It can block the effects of the medication, making them less effective. Alcohol is also a depression, this does not help your recovery from depression.   Exercise is a synergistic effect. It makes your antidepressant more effective. A healthy diet, with moderate carbohydrate intake, and minimal caffeine, and eating every 4-6 hours, will augment your efforts to treat depression.  Good sleep hygiene will help as well, going to bed at a regular time, and sleeping 8 hours a night. You may try things such as yoga, meditation, pilates, or other cardiovascular exercise to help.   Do not stop taking this medication cold turkey. If you have a desire to stop, please contact me so we can plan a taper to decrease to avoid symptoms.   ER warning signs: If you experience any suicidal thoughts, call 9-1-1 or go to ER immediately.        If you  have any questions or concerns, please don't hesitate to call.        Sincerely,        DEVIN Narvaez.    Electronically Signed

## 2017-06-08 NOTE — PROGRESS NOTES
SUBJECTIVE: 22 y.o. female for annual routine gynecologic exam  Chief Complaint   Patient presents with   • Annual Exam     Reactive Depression   This is an issue that has been ongoing over this past year, but has never really been addressed. I have seen patient for this diagnosis on 2 separate occasions and referred her to psychology, but nothing has come up this. Apparently there was a triggering incident making this more of a reactive depression. She is very interested in both pharmacological treatment and psychology referral. She has seen Radha with psych once in the past, but she Radha left for another position. Patient is eager for treatment. She does not exercise nor eat healthy. Does have supportive mother.     DIANE 7 Score: 18   Patient Health Questionaire    Little interest or pleasure in doing things?: 3   Feeling down, depressed, or hopeless?: 3  Trouble falling or staying asleep, or sleeping too much? : 3  Feeling tired or having little energy? : 3  Poor appetite or overeating? : 3  Feeling bad about yourself - or that you are a failure or have let yourself or your family down? : 3  Trouble concentrating on things, such as reading the newspaper or watching television? : 3  Moving or speaking so slowly that other people could have noticed.  Or the opposite - being so fidgety or restless that you have been moving around alot more than usual. : 3  Thoughts that you would be better off dead, or of hurting yourself?: 1  Patient Health Questionnaire Score: 25      Interpretation of PHQ-9 Total Score   Score Severity   20-27 Severe Depression        Obstetric History       T1      TAB0   SAB0   E0   M0   L2       Last Pap: never   Contraception use: IUD  History   Sexual Activity   • Sexual Activity:   • Partners: Male   • Birth Control/ Protection: IUD     Sexual history: currently sexually active, single partner   H/O Abnormal Pap no  She  reports that she has never smoked. She has never  used smokeless tobacco.    LMP Date:  (pt has IUD, not having periods)   Allergies: Review of patient's allergies indicates no known allergies.     ROS:    Menses not occurring d/t IUD  No significant bloating/fluid retention, pelvic pain, or dyspareunia. + large vaginal discharge   No breast tenderness, mass, nipple discharge, changes in size or contour, or abnormal cyclic discomfort.  No urinary tract symptoms, no incontinence.   No abdominal pain, change in bowel habits, black or bloody stools.    +migraine headaches behind eyes. No unusual headaches, no visual changes  No prolonged cough. No dyspnea or chest pain on exertion.  No depression, labile mood, anxiety, libido changes, insomnia.  No polydipsia, polyuria, temperature intolerance.  No new/concerning skin lesions, concerns.     Exercise: no regular exercise program.   24 hour diet recall: reports eating occasionally, but when she does eat, she eats very large portions     Current medicines (including changes today)  Current Outpatient Prescriptions   Medication Sig Dispense Refill   • citalopram (CELEXA) 10 MG tablet Take 1 Tab by mouth every day. 30 Tab 0   • doxycycline (VIBRAMYCIN) 100 MG Tab Take 1 Tab by mouth 2 times a day. 20 Tab 0   • hydrocodone-acetaminophen (NORCO) 5-325 MG Tab per tablet TAKE 1 TO 2 TABLETS ORALLY EVERY 6 HOURS AS NEEDED  0   • fluticasone (FLONASE) 50 MCG/ACT nasal spray Spray 2 Sprays in nose every day. 16 g 1   • albuterol 108 (90 BASE) MCG/ACT Aero Soln inhalation aerosol Inhale 2 Puffs by mouth every 6 hours as needed. 8.5 g 0   • polyethylene glycol/lytes (MIRALAX) Pack Take 1 Packet by mouth every day. 7 Each 0     No current facility-administered medications for this visit.     She  has a past medical history of Anxiety and Depression. She also has no past medical history of Asthma.  She  has past surgical history that includes tonsillectomy (2000); cervical cerclage (2/20/2015); other; and primary c section  "(3/28/2015).     Family History:   Family History   Problem Relation Age of Onset   • Diabetes Father    • Diabetes Paternal Uncle    • Cancer Maternal Grandfather    • No Known Problems Daughter    • No Known Problems Daughter           OBJECTIVE:   BP 84/56 mmHg  Pulse 79  Temp(Src) 36.1 °C (97 °F)  Resp 12  Ht 1.626 m (5' 4.02\")  Wt 116.121 kg (256 lb)  BMI 43.92 kg/m2  SpO2 95%  Body mass index is 43.92 kg/(m^2).    HEAD AND NECK:  Ears normal.  Throat, oral cavity and tongue normal.  Neck supple. No adenopathy or masses in the neck or supraclavicular regions.  No carotid bruits. No thyromegaly. NEURO: Cranial nerves are normal. CHEST:  Clear, good air entry, no wheezes or rales. HEART:  Regular rate and rhythm.  S1 and S2 normal. No edema or JVD. ABDOMEN:  Soft without tenderness, guarding, mass or organomegaly.  No CVA tenderness or inguinal adenopathy. EXTREMITIES:  Extremities and peripheral pulses are normal. SKIN: color normal, vascularity normal, no edema, temperature normal   No rashes or suspicious skin lesions noted.     Breast Exam: Performed with instruction during examination. No axillary lymphadenopathy, no skin changes, no dominant masses. No nipple retraction  Pelvic Exam -  Normal external genitalia with no lesions. Normal vaginal mucosa with normal rugation and white, thin discharge. Cervix with no visible lesions. No cervical motion tenderness. Uterus is normal sized with no masses. No IUD string present. No adnexal tenderness or enlargement appreciated. Thin Prep Pap is obtained, vaginal swab is obtained and specimen(s) sent to lab  Rectal: deferred      <ASSESSMENT and PLAN>  1. Well female exam with routine gynecological exam  THINPREP PAP WITH HPV   2. Screening for malignant neoplasm of cervix  THINPREP PAP WITH HPV   3. Encounter for screening for human papillomavirus (HPV)  THINPREP PAP WITH HPV   4. Reactive depression  Uncontrolled. Start citalopram 10 mg daily. F/u 3 weeks to " increase dose. Refer back to psych for psychotherapy. Handout given to patient for education on both non-pharmacological and synergistic tx, as well as medication information and response expectations.    citalopram (CELEXA) 10 MG tablet   5. Vaginal discharge  VAGINAL PATHOGENS DNA PANEL   6. Obesity, morbid, BMI 40.0-49.9 (CMS-[facility])  Patient identified as having weight management issue.  Appropriate orders and counseling given.    Enc small meals frequently, high vegetable intake, limit sugar. Exercise 5 days a week 30 minutes a day    7. Screen for STD (sexually transmitted disease)  HIV ANTIBODIES    TREPONEMA PALLIDUM ANTIBODIES    CHLAMYDIA/GC PCR URINE OR SWAB       Discussed  breast self exam, STD prevention, adequate intake of calcium and vitamin D, diet and exercise   Follow-up in 1 years for next Gyn exam and 3 years for next Pap.   Next office visit for recheck of chronic medical conditions is due in 3 weeks

## 2017-06-09 DIAGNOSIS — Z11.51 ENCOUNTER FOR SCREENING FOR HUMAN PAPILLOMAVIRUS (HPV): ICD-10-CM

## 2017-06-09 DIAGNOSIS — Z01.419 WELL FEMALE EXAM WITH ROUTINE GYNECOLOGICAL EXAM: ICD-10-CM

## 2017-06-09 DIAGNOSIS — Z12.4 SCREENING FOR MALIGNANT NEOPLASM OF CERVIX: ICD-10-CM

## 2017-06-09 LAB
CANDIDA DNA VAG QL PROBE+SIG AMP: POSITIVE
G VAGINALIS DNA VAG QL PROBE+SIG AMP: NEGATIVE
T VAGINALIS DNA VAG QL PROBE+SIG AMP: NEGATIVE

## 2017-06-10 LAB
C TRACH DNA SPEC QL NAA+PROBE: NEGATIVE
CYTOLOGY REG CYTOL: ABNORMAL
HPV HR 12 DNA CVX QL NAA+PROBE: POSITIVE
HPV16 DNA SPEC QL NAA+PROBE: NEGATIVE
HPV18 DNA SPEC QL NAA+PROBE: NEGATIVE
N GONORRHOEA DNA SPEC QL NAA+PROBE: NEGATIVE
SPECIMEN SOURCE: ABNORMAL
SPECIMEN SOURCE: NORMAL

## 2017-06-11 DIAGNOSIS — B97.7 HIGH RISK HPV INFECTION: ICD-10-CM

## 2017-06-11 RX ORDER — FLUCONAZOLE 150 MG/1
150 TABLET ORAL DAILY
Qty: 1 TAB | Refills: 0 | Status: SHIPPED | OUTPATIENT
Start: 2017-06-11 | End: 2017-06-29

## 2017-06-12 ENCOUNTER — TELEPHONE (OUTPATIENT)
Dept: MEDICAL GROUP | Facility: PHYSICIAN GROUP | Age: 22
End: 2017-06-12

## 2017-06-12 NOTE — TELEPHONE ENCOUNTER
----- Message from KRISTA Narvaez sent at 6/11/2017  3:47 PM PDT -----  Please advise patient that results were negative for gonorrhea, chlamydia, or bacterial infection. She does have a yeast infection; one time dose of diflucan has been sent to pharmacy for her to take. She additionally tested positive for a high risk HPV virus. We will discuss this more when I see her in a few weeks.     KRISTA Narvaez

## 2017-06-29 ENCOUNTER — OFFICE VISIT (OUTPATIENT)
Dept: MEDICAL GROUP | Facility: PHYSICIAN GROUP | Age: 22
End: 2017-06-29
Payer: COMMERCIAL

## 2017-06-29 VITALS
HEIGHT: 64 IN | OXYGEN SATURATION: 94 % | HEART RATE: 88 BPM | WEIGHT: 250 LBS | RESPIRATION RATE: 12 BRPM | BODY MASS INDEX: 42.68 KG/M2 | DIASTOLIC BLOOD PRESSURE: 68 MMHG | TEMPERATURE: 96.7 F | SYSTOLIC BLOOD PRESSURE: 102 MMHG

## 2017-06-29 DIAGNOSIS — F32.9 REACTIVE DEPRESSION: ICD-10-CM

## 2017-06-29 DIAGNOSIS — E66.01 OBESITY, MORBID, BMI 40.0-49.9 (HCC): ICD-10-CM

## 2017-06-29 PROCEDURE — 99212 OFFICE O/P EST SF 10 MIN: CPT | Performed by: NURSE PRACTITIONER

## 2017-06-29 RX ORDER — FLUOXETINE HYDROCHLORIDE 20 MG/1
20 CAPSULE ORAL DAILY
Qty: 30 CAP | Refills: 2 | Status: SHIPPED | OUTPATIENT
Start: 2017-06-29 | End: 2017-08-16

## 2017-06-29 RX ORDER — METHYLPREDNISOLONE 4 MG/1
TABLET ORAL
Refills: 0 | COMMUNITY
Start: 2017-05-30 | End: 2017-06-29

## 2017-06-29 RX ORDER — CODEINE PHOSPHATE AND GUAIFENESIN 10; 100 MG/5ML; MG/5ML
SOLUTION ORAL
Refills: 0 | COMMUNITY
Start: 2017-05-30 | End: 2017-06-29

## 2017-06-29 NOTE — ASSESSMENT & PLAN NOTE
Reports not doing any exercise. States she is going to school and working. Since starting citalopram she has had excessive fatigue, so she is not eating much of anything. 24 hour diet recall: has not eaten anything she says. When she does eat, admits to eating too much because she feels starving. She has lost 6 lb in 3 weeks.

## 2017-06-29 NOTE — ASSESSMENT & PLAN NOTE
Established problem that has been occurring for one year. She is seeing psychology for non-pharmacological tx starting tonight. 3 weeks ago we started her on citalopram and she has been excessively fatigued from this. Denies any suicidal ideations, just states she just wants to sleep all day.

## 2017-06-29 NOTE — PROGRESS NOTES
Subjective:     Chief Complaint   Patient presents with   • Follow-Up     1 month for depression       HPI  Anastasiiakarine Cristina Sanchez is a 22 y.o. female here today for f/u on starting of SSRI    Obesity, morbid, BMI 40.0-49.9 (CMS-[facility])  Reports not doing any exercise. States she is going to school and working. Since starting citalopram she has had excessive fatigue, so she is not eating much of anything. 24 hour diet recall: has not eaten anything she says. When she does eat, admits to eating too much because she feels starving. She has lost 6 lb in 3 weeks.      Reactive depression  Established problem that has been occurring for one year. She is seeing psychology for non-pharmacological tx starting tonight. 3 weeks ago PHQ 9 score was 25, DIANE 7 Score 18, and we started her on citalopram. She has been excessively fatigued from this. Reports no change in mood otherwise. Denies any suicidal ideations, just states she just wants to sleep all day.       Diagnoses of Reactive depression and Obesity, morbid, BMI 40.0-49.9 (CMS-[facility]) were pertinent to this visit.    Allergies: Review of patient's allergies indicates no known allergies.  Current medicines (including changes today)  Current Outpatient Prescriptions   Medication Sig Dispense Refill   • fluoxetine (PROZAC) 20 MG Cap Take 1 Cap by mouth every day. 30 Cap 2   • albuterol 108 (90 BASE) MCG/ACT Aero Soln inhalation aerosol Inhale 2 Puffs by mouth every 6 hours as needed. 8.5 g 0   • fluticasone (FLONASE) 50 MCG/ACT nasal spray Spray 2 Sprays in nose every day. 16 g 1   • polyethylene glycol/lytes (MIRALAX) Pack Take 1 Packet by mouth every day. 7 Each 0     No current facility-administered medications for this visit.       She  has a past medical history of Anxiety and Depression. She also has no past medical history of Asthma.      ROS  As stated in HPI      Objective:     Blood pressure 102/68, pulse 88, temperature 35.9 °C (96.7 °F), resp. rate 12, height 1.626 m  "(5' 4\"), weight 113.399 kg (250 lb), SpO2 94 %, unknown if currently breastfeeding. Body mass index is 42.89 kg/(m^2).  Physical Exam:  General: Alert, oriented, in no acute distress.  Eye contact is good, speech goal directed, affect calm. Much more calm than her baseline. Yawning frequently. Appears fatigued.   Gait steady.     Assessment and Plan:   Assessment/Plan:  1. Reactive depression  Not controlled. Citalopram causing too much drowsiness. Switch to fluoxetine 20 mg daily. F/u 4 weeks. Continue with psych tonight and as recommended. Re-educated on risk for increased suicidal thoughts with SSRI and what to do if this occurs  - fluoxetine (PROZAC) 20 MG Cap; Take 1 Cap by mouth every day.  Dispense: 30 Cap; Refill: 2    2. Obesity, morbid, BMI 40.0-49.9 (CMS-HCC)  Not controlled. Not exercising, although side effect of citalopram contributing to this. Eating poorly. Re-educated patient on importance of small meals frequently, low sugar and carb diet          Follow up:  Return in about 4 weeks (around 7/27/2017), or depresion.    Educated in proper administration of medication(s) ordered today including safety, possible SE, risks, benefits, rationale and alternatives to therapy.   Supportive care, differential diagnoses, and indications for immediate follow-up discussed with patient.    Pathogenesis of diagnosis discussed including typical length and natural progression.    Instructed to return to clinic or nearest emergency department for any change in condition, further concerns, or worsening of symptoms.  Patient states understanding of the plan of care and discharge instructions.      Please note that this dictation was created using voice recognition software. I have made every reasonable attempt to correct obvious errors, but I expect that there are errors of grammar and possibly content that I did not discover before finalizing the note.    Followup: Return in about 4 weeks (around 7/27/2017), or " depresion. sooner should new symptoms or problems arise.

## 2017-08-16 ENCOUNTER — OFFICE VISIT (OUTPATIENT)
Dept: MEDICAL GROUP | Facility: PHYSICIAN GROUP | Age: 22
End: 2017-08-16
Payer: COMMERCIAL

## 2017-08-16 VITALS
SYSTOLIC BLOOD PRESSURE: 112 MMHG | HEIGHT: 64 IN | TEMPERATURE: 97.2 F | BODY MASS INDEX: 42.68 KG/M2 | OXYGEN SATURATION: 96 % | DIASTOLIC BLOOD PRESSURE: 78 MMHG | RESPIRATION RATE: 12 BRPM | HEART RATE: 82 BPM | WEIGHT: 250 LBS

## 2017-08-16 DIAGNOSIS — E66.01 OBESITY, MORBID, BMI 40.0-49.9 (HCC): ICD-10-CM

## 2017-08-16 DIAGNOSIS — F32.9 REACTIVE DEPRESSION: ICD-10-CM

## 2017-08-16 PROCEDURE — 99213 OFFICE O/P EST LOW 20 MIN: CPT | Performed by: NURSE PRACTITIONER

## 2017-08-16 RX ORDER — BUPROPION HYDROCHLORIDE 100 MG/1
100 TABLET, EXTENDED RELEASE ORAL
Refills: 0 | COMMUNITY
Start: 2017-07-27 | End: 2017-09-28

## 2017-08-16 RX ORDER — DOXYCYCLINE HYCLATE 100 MG
100 TABLET ORAL
Refills: 0 | COMMUNITY
Start: 2017-05-30 | End: 2017-08-16

## 2017-08-16 RX ORDER — FLUCONAZOLE 150 MG/1
150 TABLET ORAL
Refills: 0 | COMMUNITY
Start: 2017-06-11 | End: 2017-08-16

## 2017-08-16 RX ORDER — TRAZODONE HYDROCHLORIDE 50 MG/1
TABLET ORAL
Refills: 0 | COMMUNITY
Start: 2017-07-27 | End: 2018-02-09

## 2017-08-16 RX ORDER — CITALOPRAM HYDROBROMIDE 10 MG/1
TABLET ORAL
COMMUNITY
Start: 2017-06-08 | End: 2017-08-16

## 2017-08-16 ASSESSMENT — PATIENT HEALTH QUESTIONNAIRE - PHQ9
4. FEELING TIRED OR HAVING LITTLE ENERGY: 2
SUM OF ALL RESPONSES TO PHQ QUESTIONS 1-9: 12
SUM OF ALL RESPONSES TO PHQ9 QUESTIONS 1 AND 2: 4
7. TROUBLE CONCENTRATING ON THINGS, SUCH AS READING THE NEWSPAPER OR WATCHING TELEVISION: 3
5. POOR APPETITE OR OVEREATING: 1
2. FEELING DOWN, DEPRESSED, IRRITABLE, OR HOPELESS: 2
6. FEELING BAD ABOUT YOURSELF - OR THAT YOU ARE A FAILURE OR HAVE LET YOURSELF OR YOUR FAMILY DOWN: 1
8. MOVING OR SPEAKING SO SLOWLY THAT OTHER PEOPLE COULD HAVE NOTICED. OR THE OPPOSITE, BEING SO FIGETY OR RESTLESS THAT YOU HAVE BEEN MOVING AROUND A LOT MORE THAN USUAL: 0
9. THOUGHTS THAT YOU WOULD BE BETTER OFF DEAD, OR OF HURTING YOURSELF: 1
3. TROUBLE FALLING OR STAYING ASLEEP OR SLEEPING TOO MUCH: 0
1. LITTLE INTEREST OR PLEASURE IN DOING THINGS: 2

## 2017-08-16 NOTE — PROGRESS NOTES
Subjective:     Chief Complaint   Patient presents with   • Follow-Up     2 months       HPI  Anastasiiakarine Cristina Sanchez is a 22 y.o. female here today for routine f/u on weight management and depression     Obesity, morbid, BMI 40.0-49.9 (CMS-HCC)  Ongoing problem, not controlled, but patient is putting in more effort now than she has in the past.   She has not been trying to eat more frequently (I recommended small meals frequently), but working on choosing healthier options. She has not eaten fast food for 1.5 weeks, where previously was eating out nearly daily. She is packing her lunch with sandwiches. She is eating a snack in the morning like a granola bar and glass of milk. Eating vegetables twice daily  She has not drank soda for 20 days.   She continues to be in school and working full time. No exercise except for playing with her to children.     Reactive depression  Ongoing problem that has improved significantly over the past 6 weeks. She is now seeing psychology once weekly additionally followed by psychiatry who has managed her medications (Skyla Yuan). SSRI class was giving her excessive drowsiness, therefore she was switched to Wellbutrin and she reports feeling improved on this. Energy levels are improved and depression has improved.    Patient Health Questionaire    Little interest or pleasure in doing things?: 2   Feeling down, depressed, or hopeless?: 2  Trouble falling or staying asleep, or sleeping too much? : 0  Feeling tired or having little energy? : 2  Poor appetite or overeating? : 1  Feeling bad about yourself - or that you are a failure or have let yourself or your family down? : 1  Trouble concentrating on things, such as reading the newspaper or watching television? : 3  Moving or speaking so slowly that other people could have noticed.  Or the opposite - being so fidgety or restless that you have been moving around alot more than usual. : 0  Thoughts that you would be better off dead, or  "of hurting yourself?: 1  Patient Health Questionnaire Score: 12    6/8/17 PHQ 9 Score was 25)    Interpretation of PHQ-9 Total Score   Score Severity   10-14 Moderate Depression                Diagnoses of Reactive depression and Obesity, morbid, BMI 40.0-49.9 (CMS-[facility]) were pertinent to this visit.    Allergies: Review of patient's allergies indicates no known allergies.  Current medicines (including changes today)  Current Outpatient Prescriptions   Medication Sig Dispense Refill   • buPROPion SR (WELLBUTRIN-SR) 100 MG TABLET SR 12 HR Take 100 mg by mouth.  0   • trazodone (DESYREL) 50 MG Tab TAKE 1/2 TO 1 TABLET ORALLY AT BEDTIME  0     No current facility-administered medications for this visit.       She  has a past medical history of Anxiety and Depression. She also has no past medical history of Asthma.    Health Maintenance: UTD    ROS  As stated in HPI      Objective:     Blood pressure 112/78, pulse 82, temperature 36.2 °C (97.2 °F), resp. rate 12, height 1.626 m (5' 4\"), weight 113.399 kg (250 lb), SpO2 96 %, unknown if currently breastfeeding. Body mass index is 42.89 kg/(m^2).  Physical Exam:  General: Alert, oriented, in no acute distress.  Eye contact is good, speech goal directed, affect calm and pleasant   CNs grossly intact.  Gross hearing intact.  Gait steady.     Assessment and Plan:   Assessment/Plan:  1. Reactive depression  Improving, stable on wellbutrin, continue f/u with [facility]     2. Obesity, morbid, BMI 40.0-49.9 (CMS-[facility])  Improving. Motivation beginning to show. Has reached goals for this past 6 weeks. Re-educated patient on diet/exercise choices. F/u in 3 weeks.   New goals: continue without soda, start exercise 1 day a week for 15-30 minutes, monitor dinner (time and what she ate), continue with breakfast daily. Goal weight loss 2 lb in 3 weeks (248 lb)    The total time spent seeing the patient in consultation, and formulating an action plan for this visit was 15 minutes.  Greater " than 50% of this time was spent face to face counseling, discussing problems documented above, coordinating care and answering questions by the provider.        Follow up:  Return in about 3 weeks (around 9/6/2017).    Educated in proper administration of medication(s) ordered today including safety, possible SE, risks, benefits, rationale and alternatives to therapy.   Supportive care, differential diagnoses, and indications for immediate follow-up discussed with patient.    Pathogenesis of diagnosis discussed including typical length and natural progression.    Instructed to return to clinic or nearest emergency department for any change in condition, further concerns, or worsening of symptoms.  Patient states understanding of the plan of care and discharge instructions.      Please note that this dictation was created using voice recognition software. I have made every reasonable attempt to correct obvious errors, but I expect that there are errors of grammar and possibly content that I did not discover before finalizing the note.    Followup: Return in about 3 weeks (around 9/6/2017). sooner should new symptoms or problems arise.

## 2017-08-16 NOTE — ASSESSMENT & PLAN NOTE
Ongoing problem that has improved significantly over the past 6 weeks. She is now seeing psychology once weekly additionally followed by psychiatry who has managed her medications (Skyla Yuan). SSRI class was giving her excessive drowsiness, therefore she was switched to Wellbutrin and she reports feeling improved on this. Energy levels are improved and depression has improved.    Patient Health Questionaire    Little interest or pleasure in doing things?: 2   Feeling down, depressed, or hopeless?: 2  Trouble falling or staying asleep, or sleeping too much? : 0  Feeling tired or having little energy? : 2  Poor appetite or overeating? : 1  Feeling bad about yourself - or that you are a failure or have let yourself or your family down? : 1  Trouble concentrating on things, such as reading the newspaper or watching television? : 3  Moving or speaking so slowly that other people could have noticed.  Or the opposite - being so fidgety or restless that you have been moving around alot more than usual. : 0  Thoughts that you would be better off dead, or of hurting yourself?: 1  Patient Health Questionnaire Score: 12    6/8/17 PHQ 9 Score was 25)    Interpretation of PHQ-9 Total Score   Score Severity   10-14 Moderate Depression

## 2017-08-16 NOTE — MR AVS SNAPSHOT
"        Mary Carmen Sanchez   2017 7:40 AM   Office Visit   MRN: [id number]    Department:  [facility]   Dept Phone:  [phone]    Description:  Female : 1995   Provider:  KRISTA Narvaez           Reason for Visit     Follow-Up 2 months      Allergies as of 2017     No Known Allergies      You were diagnosed with     Reactive depression   [265637]       Obesity, morbid, BMI 40.0-49.9 (CMS-HCC)   [225855]         Vital Signs     Blood Pressure Pulse Temperature Respirations Height Weight    112/78 mmHg 82 36.2 °C (97.2 °F) 12 1.626 m (5' 4\") 113.399 kg (250 lb)    Body Mass Index Oxygen Saturation Smoking Status             42.89 kg/m2 96% Never Smoker          Basic Information     Date Of Birth Sex Race Ethnicity Preferred Language    1995 Female Black or  Non- English      Your appointments     Sep 06, 2017  7:40 AM   Established Patient with KRISTA Narvaez   [facility] ([address] [address] [phone]           You will be receiving a confirmation call a few days before your appointment from our automated call confirmation system.              Problem List              ICD-10-CM Priority Class Noted - Resolved    Reactive depression F32.9   2016 - Present    Pain at surgical site G89.18   2016 - Present    Mid back pain M54.9   2016 - Present    Epidermal cyst L72.0   2016 - Present    Obesity, morbid, BMI 40.0-49.9 (CMS-HCC) E66.01   2016 - Present    High risk HPV infection A63.0   2017 - Present      Health Maintenance        Date Due Completion Dates    IMM VARICELLA (CHICKENPOX) VACCINE (1 of 2 - 2 Dose Adolescent Series) 2009 ---    IMM HPV VACCINE (2 of 3 - Female 3 Dose Series) 2/3/2012 2011    IMM MENINGOCOCCAL VACCINE (MCV4) (2 of 2) 2/3/2012 2011    IMM INFLUENZA (1) 2017 ---    PAP SMEAR 2020    IMM DTaP/Tdap/Td Vaccine (2 - Td) " 4/1/2025 4/1/2015            Current Immunizations     HPV 9-VALENT VACCINE (GARDASIL 9) 12/9/2011    Hepatitis A Vaccine, Ped/Adol 2/3/2003, 7/1/2002    Hepatitis B Vaccine Non-Recombivax (Ped/Adol) 5/11/2000, 9/3/1999, 1995    Meningococcal Conjugate Vaccine MCV4 (Menactra) 12/9/2011    Tdap Vaccine 4/1/2015  7:09 AM      Below and/or attached are the medications your provider expects you to take. Review all of your home medications and newly ordered medications with your provider and/or pharmacist. Follow medication instructions as directed by your provider and/or pharmacist. Please keep your medication list with you and share with your provider. Update the information when medications are discontinued, doses are changed, or new medications (including over-the-counter products) are added; and carry medication information at all times in the event of emergency situations     Allergies:  No Known Allergies          Medications  Valid as of: August 16, 2017 -  8:13 AM    Generic Name Brand Name Tablet Size Instructions for use    BuPROPion HCl (TABLET SR 12 HR) WELLBUTRIN- MG Take 100 mg by mouth.        TraZODone HCl (Tab) DESYREL 50 MG TAKE 1/2 TO 1 TABLET ORALLY AT BEDTIME        .                 Medicines prescribed today were sent to:     [id number] - [place] - [address]    [address] [place] [address]    Phone: [phone] Fax: [phone]    Open 24 Hours?: No      Medication refill instructions:       If your prescription bottle indicates you have medication refills left, it is not necessary to call your provider’s office. Please contact your pharmacy and they will refill your medication.    If your prescription bottle indicates you do not have any refills left, you may request refills at any time through one of the following ways: The online [employer] system (except Urgent Care), by calling your provider’s office, or by asking your pharmacy to contact your provider’s  office with a refill request. Medication refills are processed only during regular business hours and may not be available until the next business day. Your provider may request additional information or to have a follow-up visit with you prior to refilling your medication.   *Please Note: Medication refills are assigned a new Rx number when refilled electronically. Your pharmacy may indicate that no refills were authorized even though a new prescription for the same medication is available at the pharmacy. Please request the medicine by name with the pharmacy before contacting your provider for a refill.           [employer] Access Code: [email or web address]  Expires: 8/23/2017  3:52 AM    Corventis  A secure, online tool to manage your health information     [facility]’s Corventis® is a secure, online tool that connects you to your personalized health information from the privacy of your home -- day or night - making it very easy for you to manage your healthcare. Once the activation process is completed, you can even access your medical information using the Corventis amish, which is available for free in the Apple Amish store or Google Play store.     Corventis provides the following levels of access (as shown below):   My Chart Features   [facility] Doctor [facility]  Urgent  Care Non-[facility]  Primary Care  Doctor   Email your healthcare team securely and privately 24/7 X X X    Manage appointments: schedule your next appointment; view details of past/upcoming appointments X      Request prescription refills. X      View recent personal medical records, including lab and immunizations X X X X   View health record, including health history, allergies, medications X X X X   Read reports about your outpatient visits, procedures, consult and ER notes X X X X   See your discharge summary, which is a recap of your hospital and/or ER visit that includes your diagnosis, lab results, and care plan. X X        How to register for [employer]ok:  1. Go to  [email or web address].  2. Click on the Sign Up Now box, which takes you to the New Member Sign Up page. You will need to provide the following information:  a. Enter your 250[employer] Access Code exactly as it appears at the top of this page. (You will not need to use this code after you’ve completed the sign-up process. If you do not sign up before the expiration date, you must request a new code.)   b. Enter your date of birth.   c. Enter your home email address.   d. Click Submit, and follow the next screen’s instructions.  3. Create a [employer] ID. This will be your 250ok login ID and cannot be changed, so think of one that is secure and easy to remember.  4. Create a [employer] password. You can change your password at any time.  5. Enter your Password Reset Question and Answer. This can be used at a later time if you forget your password.   6. Enter your e-mail address. This allows you to receive e-mail notifications when new information is available in 250ok.  7. Click Sign Up. You can now view your health information.    For assistance activating your [employer]ok account, call [phone]

## 2017-08-16 NOTE — ASSESSMENT & PLAN NOTE
Ongoing problem, not controlled, but patient is putting in more effort now than she has in the past.   She has not been trying to eat more frequently (I recommended small meals frequently), but working on choosing healthier options. She has not eaten fast food for 1.5 weeks, where previously was eating out nearly daily. She is packing her lunch with sandwiches. She is eating a snack in the morning like a granola bar and glass of milk. Eating vegetables twice daily  She has not drank soda for 20 days.   She continues to be in school and working full time. No exercise except for playing with her to children.

## 2017-09-07 ENCOUNTER — OFFICE VISIT (OUTPATIENT)
Dept: MEDICAL GROUP | Facility: PHYSICIAN GROUP | Age: 22
End: 2017-09-07
Payer: COMMERCIAL

## 2017-09-07 VITALS
TEMPERATURE: 98.2 F | BODY MASS INDEX: 42.34 KG/M2 | RESPIRATION RATE: 16 BRPM | HEART RATE: 73 BPM | WEIGHT: 248 LBS | HEIGHT: 64 IN | SYSTOLIC BLOOD PRESSURE: 120 MMHG | OXYGEN SATURATION: 95 % | DIASTOLIC BLOOD PRESSURE: 70 MMHG

## 2017-09-07 DIAGNOSIS — F32.9 REACTIVE DEPRESSION: ICD-10-CM

## 2017-09-07 DIAGNOSIS — E66.01 OBESITY, MORBID, BMI 40.0-49.9 (HCC): ICD-10-CM

## 2017-09-07 PROCEDURE — 99213 OFFICE O/P EST LOW 20 MIN: CPT | Performed by: NURSE PRACTITIONER

## 2017-09-07 RX ORDER — FLUCONAZOLE 150 MG/1
TABLET ORAL
COMMUNITY
Start: 2017-06-11 | End: 2017-09-07

## 2017-09-07 RX ORDER — FLUOXETINE HYDROCHLORIDE 20 MG/1
20 CAPSULE ORAL
Refills: 2 | COMMUNITY
Start: 2017-06-29 | End: 2017-09-07

## 2017-09-07 ASSESSMENT — PATIENT HEALTH QUESTIONNAIRE - PHQ9
1. LITTLE INTEREST OR PLEASURE IN DOING THINGS: 2
3. TROUBLE FALLING OR STAYING ASLEEP OR SLEEPING TOO MUCH: 0
8. MOVING OR SPEAKING SO SLOWLY THAT OTHER PEOPLE COULD HAVE NOTICED. OR THE OPPOSITE, BEING SO FIGETY OR RESTLESS THAT YOU HAVE BEEN MOVING AROUND A LOT MORE THAN USUAL: 0
7. TROUBLE CONCENTRATING ON THINGS, SUCH AS READING THE NEWSPAPER OR WATCHING TELEVISION: 3
4. FEELING TIRED OR HAVING LITTLE ENERGY: 3
SUM OF ALL RESPONSES TO PHQ9 QUESTIONS 1 AND 2: 4
6. FEELING BAD ABOUT YOURSELF - OR THAT YOU ARE A FAILURE OR HAVE LET YOURSELF OR YOUR FAMILY DOWN: 1
5. POOR APPETITE OR OVEREATING: 2
2. FEELING DOWN, DEPRESSED, IRRITABLE, OR HOPELESS: 2
9. THOUGHTS THAT YOU WOULD BE BETTER OFF DEAD, OR OF HURTING YOURSELF: 0
SUM OF ALL RESPONSES TO PHQ QUESTIONS 1-9: 13

## 2017-09-07 NOTE — ASSESSMENT & PLAN NOTE
Ongoing problem. Patient reports she is feeling like she is doing well and improving. She liked the bupropion. Failed SSRIs d/t excessive fatigue side effect. She is now rarely using trazodone for sleep. Currently following psych every 2 weeks, and current tx plan is addressing PTSD. She is happy with her progress.     DIANE 7: 11    Patient Health Questionaire    Little interest or pleasure in doing things?: 2   Feeling down, depressed, or hopeless?: 2  Trouble falling or staying asleep, or sleeping too much? : 0  Feeling tired or having little energy? : 3  Poor appetite or overeating? : 2  Feeling bad about yourself - or that you are a failure or have let yourself or your family down? : 1  Trouble concentrating on things, such as reading the newspaper or watching television? : 3  Moving or speaking so slowly that other people could have noticed.  Or the opposite - being so fidgety or restless that you have been moving around alot more than usual. : 0  Thoughts that you would be better off dead, or of hurting yourself?: 0  Patient Health Questionnaire Score: 13    (was 12 8/16/17)  If depressive symptoms identified deferred to follow up visit unless specifically addressed in assesment and plan.    Interpretation of PHQ-9 Total Score   Score Severity   1-4 No Depression   5-9 Mild Depression   10-14 Moderate Depression   15-19 Moderately Severe Depression   20-27 Severe Depression

## 2017-09-07 NOTE — PROGRESS NOTES
Subjective:     Chief Complaint   Patient presents with   • Follow-Up       HPI  Mary Carmen Sanchez is a 22 y.o. female here today for f/u on depression and weight management.     Reactive depression  Ongoing problem. Patient reports she is feeling like she is doing well and improving. She liked the bupropion. Failed SSRIs d/t excessive fatigue side effect. She is now rarely using trazodone for sleep. Currently following psych every 2 weeks, and current tx plan is addressing PTSD. She is happy with her progress.     DIANE 7: 11    Patient Health Questionaire    Little interest or pleasure in doing things?: 2   Feeling down, depressed, or hopeless?: 2  Trouble falling or staying asleep, or sleeping too much? : 0  Feeling tired or having little energy? : 3  Poor appetite or overeating? : 2  Feeling bad about yourself - or that you are a failure or have let yourself or your family down? : 1  Trouble concentrating on things, such as reading the newspaper or watching television? : 3  Moving or speaking so slowly that other people could have noticed.  Or the opposite - being so fidgety or restless that you have been moving around alot more than usual. : 0  Thoughts that you would be better off dead, or of hurting yourself?: 0  Patient Health Questionnaire Score: 13    (was 12 8/16/17)  If depressive symptoms identified deferred to follow up visit unless specifically addressed in assesment and plan.    Interpretation of PHQ-9 Total Score   Score Severity   1-4 No Depression   5-9 Mild Depression   10-14 Moderate Depression   15-19 Moderately Severe Depression   20-27 Severe Depression        Obesity, morbid, BMI 40.0-49.9 (CMS-HCC)  Ongoing chronic problem. She has lost 2 lb in 3 weeks. She is showing motivation to make changes, but still is in need of knowledge in regards to healthy diet choices.   She is trying to eat multiple meals throughout the day rather than binge eating once daily. Yesterday had 4 total  "meals/snacks. Morning was honey nut cheerios, snack was cheese-its, lunch chicken salad from [employer], go-gurt for snack, and had one slice of pizza at work for dinner.   This is day 58 without soda. Drinking water daily for fluids.   No physical activity. She is in school full time, working full time, and is a single mom.        Diagnoses of Reactive depression and Obesity, morbid, BMI 40.0-49.9 (CMS-[facility]) were pertinent to this visit.    Allergies: Review of patient's allergies indicates no known allergies.  Current medicines (including changes today)  Current Outpatient Prescriptions   Medication Sig Dispense Refill   • buPROPion SR (WELLBUTRIN-SR) 100 MG TABLET SR 12 HR Take 100 mg by mouth.  0   • trazodone (DESYREL) 50 MG Tab TAKE 1/2 TO 1 TABLET ORALLY AT BEDTIME  0     No current facility-administered medications for this visit.        She  has a past medical history of Anxiety and Depression. She also has no past medical history of Asthma.      ROS  As stated in HPI      Objective:     Blood pressure 120/70, pulse 73, temperature 36.8 °C (98.2 °F), resp. rate 16, height 1.626 m (5' 4\"), weight 112.5 kg (248 lb), SpO2 95 %, unknown if currently breastfeeding. Body mass index is 42.57 kg/m².  Physical Exam:  General: Alert, oriented, in no acute distress.  Eye contact is good, speech goal directed, affect calm  CNs grossly intact.  HEENT: conjunctiva non-injected, sclera non-icteric, EOMs intact.   Gross hearing intact.  Skin: No rashes or lesions in visible areas  Gait steady.     Assessment and Plan:   Assessment/Plan:  1. Reactive depression  Improving, stable, continue f/u with psych and bupropion     2. Obesity, morbid, BMI 40.0-49.9 (CMS-[facility])  Improving slowly. Motivation present. Knowledge deficit in healthy nutrition. Verbal education and written handout given on Mediterranean diet. Short term goals made today. F/u 3 weeks.   Here are some short term goals I would like you to work on in this next 4 " weeks:     1. Bring one healthy snack to school with you daily that is either a fruit, vegetable, or protein.          Ex. Apple and peanut butter, veggies & hummus, sugar free yogurt, nuts/seeds and fruit     2. Add a protein to your breakfast          Ex. Turkey haney, eggs or hard boiled egg, almonds or peanut butter     For physical activity:     1. Park in the back of the parking lot so you have to walk further during the day      2. Always take the stairs instead of elevator/escalator.     The total time spent seeing the patient in consultation, and formulating an action plan for this visit was 20 minutes.  Greater than 50% of this time was spent face to face counseling, discussing problems documented above, coordinating care and answering questions by the provider.      Follow up:  Return in about 4 weeks (around 10/5/2017).    Educated in proper administration of medication(s) ordered today including safety, possible SE, risks, benefits, rationale and alternatives to therapy.   Supportive care, differential diagnoses, and indications for immediate follow-up discussed with patient.    Pathogenesis of diagnosis discussed including typical length and natural progression.    Instructed to return to clinic or nearest emergency department for any change in condition, further concerns, or worsening of symptoms.  Patient states understanding of the plan of care and discharge instructions.      Please note that this dictation was created using voice recognition software. I have made every reasonable attempt to correct obvious errors, but I expect that there are errors of grammar and possibly content that I did not discover before finalizing the note.    Followup: Return in about 4 weeks (around 10/5/2017). sooner should new symptoms or problems arise.

## 2017-09-07 NOTE — LETTER
September 7, 2017        Dear Mary Carmen:    I want to congratulate you on your efforts to make changes in your lifestyle with your nutrition. You are showing motivation to try, which is great.     Here are some short term goals I would like you to work on in this next 4 weeks:     1. Bring one healthy snack to school with you daily that is either a fruit, vegetable, or protein.          Ex. Apple and peanut butter, veggies & hummus, sugar free yogurt, nuts/seeds and fruit     2. Add a protein to your breakfast          Ex. Turkey haney, eggs or hard boiled egg, almonds or peanut butter    For physical activity:     1. Park in the back of the parking lot so you have to walk further during the day      2. Always take the stairs instead of elevator/escalator.     If you have any questions or concerns, please don't hesitate to call.        Sincerely,        DEVIN Narvaez.    Electronically Signed

## 2017-09-07 NOTE — ASSESSMENT & PLAN NOTE
Ongoing chronic problem. She has lost 2 lb in 3 weeks. She is showing motivation to make changes, but still is in need of knowledge in regards to healthy diet choices.   She is trying to eat multiple meals throughout the day rather than binge eating once daily. Yesterday had 4 total meals/snacks. Morning was honey nut cheerios, snack was cheese-its, lunch chicken salad from [employer], go-gurt for snack, and had one slice of pizza at work for dinner.   This is day 58 without soda. Drinking water daily for fluids.   No physical activity. She is in school full time, working full time, and is a single mom.

## 2017-09-11 ENCOUNTER — HOSPITAL ENCOUNTER (EMERGENCY)
Facility: MEDICAL CENTER | Age: 22
End: 2017-09-11
Attending: EMERGENCY MEDICINE
Payer: COMMERCIAL

## 2017-09-11 ENCOUNTER — APPOINTMENT (OUTPATIENT)
Dept: RADIOLOGY | Facility: MEDICAL CENTER | Age: 22
End: 2017-09-11
Attending: EMERGENCY MEDICINE
Payer: COMMERCIAL

## 2017-09-11 ENCOUNTER — TELEPHONE (OUTPATIENT)
Dept: PHARMACY | Facility: MEDICAL CENTER | Age: 22
End: 2017-09-11

## 2017-09-11 VITALS
HEART RATE: 74 BPM | SYSTOLIC BLOOD PRESSURE: 115 MMHG | BODY MASS INDEX: 41.78 KG/M2 | OXYGEN SATURATION: 96 % | TEMPERATURE: 99.1 F | HEIGHT: 64 IN | RESPIRATION RATE: 16 BRPM | DIASTOLIC BLOOD PRESSURE: 66 MMHG | WEIGHT: 244.71 LBS

## 2017-09-11 DIAGNOSIS — N76.0 ACUTE VAGINITIS: ICD-10-CM

## 2017-09-11 LAB
APPEARANCE UR: CLEAR
BACTERIA GENITAL QL WET PREP: NORMAL
COLOR UR AUTO: YELLOW
GLUCOSE UR QL STRIP.AUTO: NEGATIVE MG/DL
HCG UR QL: NEGATIVE
KETONES UR QL STRIP.AUTO: NEGATIVE MG/DL
LEUKOCYTE ESTERASE UR QL STRIP.AUTO: ABNORMAL
NITRITE UR QL STRIP.AUTO: NEGATIVE
PH UR STRIP.AUTO: 6 [PH]
PROT UR QL STRIP: NEGATIVE MG/DL
RBC UR QL AUTO: ABNORMAL
SIGNIFICANT IND 70042: NORMAL
SITE SITE: NORMAL
SOURCE SOURCE: NORMAL
SP GR UR: 1.02

## 2017-09-11 PROCEDURE — 87491 CHLMYD TRACH DNA AMP PROBE: CPT

## 2017-09-11 PROCEDURE — 76830 TRANSVAGINAL US NON-OB: CPT

## 2017-09-11 PROCEDURE — 81002 URINALYSIS NONAUTO W/O SCOPE: CPT

## 2017-09-11 PROCEDURE — 87591 N.GONORRHOEAE DNA AMP PROB: CPT

## 2017-09-11 PROCEDURE — 99284 EMERGENCY DEPT VISIT MOD MDM: CPT

## 2017-09-11 PROCEDURE — 700102 HCHG RX REV CODE 250 W/ 637 OVERRIDE(OP): Performed by: EMERGENCY MEDICINE

## 2017-09-11 PROCEDURE — A9270 NON-COVERED ITEM OR SERVICE: HCPCS | Performed by: EMERGENCY MEDICINE

## 2017-09-11 PROCEDURE — 81025 URINE PREGNANCY TEST: CPT

## 2017-09-11 RX ORDER — METRONIDAZOLE 500 MG/1
500 TABLET ORAL 3 TIMES DAILY
Qty: 30 TAB | Refills: 0 | Status: SHIPPED | OUTPATIENT
Start: 2017-09-11 | End: 2017-09-11 | Stop reason: SDUPTHER

## 2017-09-11 RX ORDER — FLUCONAZOLE 100 MG/1
200 TABLET ORAL ONCE
Status: COMPLETED | OUTPATIENT
Start: 2017-09-11 | End: 2017-09-11

## 2017-09-11 RX ORDER — METRONIDAZOLE 500 MG/1
500 TABLET ORAL 3 TIMES DAILY
Qty: 30 TAB | Refills: 0 | Status: SHIPPED | OUTPATIENT
Start: 2017-09-11 | End: 2017-09-21

## 2017-09-11 RX ADMIN — FLUCONAZOLE 200 MG: 100 TABLET ORAL at 12:40

## 2017-09-11 NOTE — ED PROVIDER NOTES
"ED Provider Note    CHIEF COMPLAINT  Chief Complaint   Patient presents with   • Vaginal Pain   • Vaginal Discharge       [facility]  Mary Carmen Sanchez is a 22 y.o. female who presentsTo the emergency department with vaginal discharge and discomfort. The patient states his been increasing over the last week after she had unprotected intercourse. She has an IUD in place and does not think that she is pregnant. She does not have any urinary symptoms. She does have some mild suprapubic abdominal pain.    REVIEW OF SYSTEMS  See HPI for further details. All other systems are negative.     PAST MEDICAL HISTORY  Past Medical History:   Diagnosis Date   • Anxiety    • Depression        SOCIAL HISTORY  Social History     Social History   • Marital status: Single     Spouse name: N/A   • Number of children: N/A   • Years of education: N/A     Social History Main Topics   • Smoking status: Never Smoker   • Smokeless tobacco: Never Used   • Alcohol use No   • Drug use: No   • Sexual activity: Not Currently     Partners: Male     Birth control/ protection: IUD     Other Topics Concern   • Not on file     Social History Narrative   • No narrative on file           PHYSICAL EXAM  VITAL SIGNS: /94   Pulse 85   Temp 37.3 °C (99.1 °F)   Resp 16   Ht 1.626 m (5' 4\")   Wt 111 kg (244 lb 11.4 oz)   SpO2 96%   Breastfeeding? No   BMI 42.00 kg/m²   Constitutional: Well developed, Well nourished, No acute distress, Non-toxic appearance.   HENT: Normocephalic, Atraumatic, tympanic membranes are intact and nonerythematous bilaterally, Oropharynx moist without exudates or erythema, Nose normal.   Eyes: PERRLA, EOMI, Conjunctiva normal.  Neck: Supple without meningismus  Lymphatic: No lymphadenopathy noted.   Cardiovascular: Normal heart rate, Normal rhythm, No murmurs, No rubs, No gallops.   Thorax & Lungs: Normal breath sounds, No respiratory distress, No wheezing, No chest tenderness.   Abdomen:Obese with suprapubic tenderness " to deep palpation, no rebound, no guarding, normal bowel sounds  Skin: Warm, Dry, No erythema, No rash.   Back: No tenderness, No CVA tenderness.   Genitalia: Large amount of discharge from the vaginal vault, cervical motion tenderness without friability, IUD appears to be in place, the patient does have adnexal tenderness  Extremities: Atraumatic with symmetric distal pulses, No edema, No tenderness, No cyanosis, No clubbing.   Neurologic: Alert & oriented x 3, cranial nerves II through XII are intact, Normal motor function, Normal sensory function, No focal deficits noted.   Psychiatric: Affect normal, Judgment normal, Mood normal.     RADIOLOGY/PROCEDURES  US-GYN-PELVIS TRANSVAGINAL   Final Result      1.  Slightly low position of IUD.   2.  Pelvic ultrasound otherwise unremarkable.            COURSE & MEDICAL DECISION MAKING  Pertinent Labs & Imaging studies reviewed. (See chart for details)  This a 22-year-old female who presents the emergency department with vaginal discharge and discomfort. The wet prep does show evidence of Trichomonas vaginitis. She did have some cervical motion tenderness as well as adnexal tenderness therefore an ultrasound was ordered to rule out an ovarian abscess and this was negative. She does not have any free fluid to support pelvic inflammatory disease. She did have some cervical motion tenderness with suspect this is all from the trichomonas infection as well as candidiasis. I do have a gonorrhea and chlamydia probe pending and the patient be contacted these are positive. In the interim she'll receive a one-time dose of Diflucan for the candidal component and we'll place the patient on Flagyl for the vaginitis. The patient be instructed to perform sitz baths and follow up with her primary care doctor in 5-7 days. She'll return if she is acutely worse.    FINAL IMPRESSION  1. Vaginitis       Disposition  The patient will be discharged in stable condition Electronically signed by:  Odilon Bello, 9/11/2017 11:16 AM

## 2017-09-11 NOTE — ED NOTES
"Chief Complaint   Patient presents with   • Vaginal Pain   • Vaginal Discharge     Pt reports discharge, dysuria, and vaginal pain. Pt has IUD in place and reports unprotected sexual intercourse 3 days ago. Pt provided sample cup for urine. Blood pressure 135/94, pulse 85, temperature 37.3 °C (99.1 °F), resp. rate 16, height 1.626 m (5' 4\"), weight 111 kg (244 lb 11.4 oz), SpO2 96 %, unknown if currently breastfeeding.      "

## 2017-09-11 NOTE — ED NOTES
Pt c/o vaginal pain and itching x 3 days. Pt also states thick, white d/c from vagina. Pt states IUD placed 2 years ago. Unprotected sex approx. 3 days ago. Pt also states dysuria. A/o x4, speaking in full sentences.

## 2017-09-11 NOTE — DISCHARGE INSTRUCTIONS
Vaginitis  Vaginitis is an inflammation of the vagina. It is most often caused by a change in the normal balance of the bacteria and yeast that live in the vagina. This change in balance causes an overgrowth of certain bacteria or yeast, which causes the inflammation. There are different types of vaginitis, but the most common types are:  · Bacterial vaginosis.  · Yeast infection (candidiasis).  · Trichomoniasis vaginitis. This is a sexually transmitted infection (STI).  · Viral vaginitis.  · Atrophic vaginitis.  · Allergic vaginitis.  CAUSES   The cause depends on the type of vaginitis. Vaginitis can be caused by:  · Bacteria (bacterial vaginosis).  · Yeast (yeast infection).  · A parasite (trichomoniasis vaginitis)  · A virus (viral vaginitis).  · Low hormone levels (atrophic vaginitis). Low hormone levels can occur during pregnancy, breastfeeding, or after menopause.  · Irritants, such as bubble baths, scented tampons, and feminine sprays (allergic vaginitis).  Other factors can change the normal balance of the yeast and bacteria that live in the vagina. These include:  · Antibiotic medicines.  · Poor hygiene.  · Diaphragms, vaginal sponges, spermicides, birth control pills, and intrauterine devices (IUD).  · Sexual intercourse.  · Infection.  · Uncontrolled diabetes.  · A weakened immune system.  SYMPTOMS   Symptoms can vary depending on the cause of the vaginitis. Common symptoms include:  · Abnormal vaginal discharge.  ¨ The discharge is white, gray, or yellow with bacterial vaginosis.  ¨ The discharge is thick, white, and cheesy with a yeast infection.  ¨ The discharge is frothy and yellow or greenish with trichomoniasis.  · A bad vaginal odor.  ¨ The odor is fishy with bacterial vaginosis.  · Vaginal itching, pain, or swelling.  · Painful intercourse.  · Pain or burning when urinating.  Sometimes, there are no symptoms.  TREATMENT   Treatment will vary depending on the type of infection.   · Bacterial  vaginosis and trichomoniasis are often treated with antibiotic creams or pills.  · Yeast infections are often treated with antifungal medicines, such as vaginal creams or suppositories.  · Viral vaginitis has no cure, but symptoms can be treated with medicines that relieve discomfort. Your sexual partner should be treated as well.  · Atrophic vaginitis may be treated with an estrogen cream, pill, suppository, or vaginal ring. If vaginal dryness occurs, lubricants and moisturizing creams may help. You may be told to avoid scented soaps, sprays, or douches.  · Allergic vaginitis treatment involves quitting the use of the product that is causing the problem. Vaginal creams can be used to treat the symptoms.  HOME CARE INSTRUCTIONS   · Take all medicines as directed by your caregiver.  · Keep your genital area clean and dry. Avoid soap and only rinse the area with water.  · Avoid douching. It can remove the healthy bacteria in the vagina.  · Do not use tampons or have sexual intercourse until your vaginitis has been treated. Use sanitary pads while you have vaginitis.  · Wipe from front to back. This avoids the spread of bacteria from the rectum to the vagina.  · Let air reach your genital area.  ¨ Wear cotton underwear to decrease moisture buildup.  ¨ Avoid wearing underwear while you sleep until your vaginitis is gone.  ¨ Avoid tight pants and underwear or nylons without a cotton panel.  ¨ Take off wet clothing (especially bathing suits) as soon as possible.  · Use mild, non-scented products. Avoid using irritants, such as:  ¨ Scented feminine sprays.  ¨ Fabric softeners.  ¨ Scented detergents.  ¨ Scented tampons.  ¨ Scented soaps or bubble baths.  · Practice safe sex and use condoms. Condoms may prevent the spread of trichomoniasis and viral vaginitis.  SEEK MEDICAL CARE IF:   · You have abdominal pain.  · You have a fever or persistent symptoms for more than 2-3 days.  · You have a fever and your symptoms suddenly  get worse.     This information is not intended to replace advice given to you by your health care provider. Make sure you discuss any questions you have with your health care provider.     Document Released: 10/14/2008 Document Revised: 05/03/2016 Document Reviewed: 05/30/2013  [employer] Interactive Patient Education ©2016 [employer]

## 2017-09-11 NOTE — ED NOTES
Received orders for DC. Medicated per MAR. Educated regarding use of oral ABX. All questions addressed. Ambulatory to lobby with steady gait. VSS.

## 2017-09-12 LAB
C TRACH DNA SPEC QL NAA+PROBE: NEGATIVE
N GONORRHOEA DNA SPEC QL NAA+PROBE: NEGATIVE
SPECIMEN SOURCE: NORMAL

## 2017-09-12 NOTE — ED NOTES
Patient called and stated she left her prescription here.   I have transmitted the prescription to [facility] on [address].

## 2017-09-28 ENCOUNTER — OFFICE VISIT (OUTPATIENT)
Dept: MEDICAL GROUP | Facility: PHYSICIAN GROUP | Age: 22
End: 2017-09-28
Payer: COMMERCIAL

## 2017-09-28 VITALS
HEIGHT: 64 IN | HEART RATE: 96 BPM | SYSTOLIC BLOOD PRESSURE: 100 MMHG | BODY MASS INDEX: 43.02 KG/M2 | RESPIRATION RATE: 12 BRPM | OXYGEN SATURATION: 97 % | DIASTOLIC BLOOD PRESSURE: 64 MMHG | TEMPERATURE: 97.4 F | WEIGHT: 252 LBS

## 2017-09-28 DIAGNOSIS — E66.01 OBESITY, MORBID, BMI 40.0-49.9 (HCC): ICD-10-CM

## 2017-09-28 DIAGNOSIS — F32.9 REACTIVE DEPRESSION: ICD-10-CM

## 2017-09-28 PROCEDURE — 99213 OFFICE O/P EST LOW 20 MIN: CPT | Performed by: NURSE PRACTITIONER

## 2017-09-28 RX ORDER — BUPROPION HYDROCHLORIDE 150 MG/1
150 TABLET, EXTENDED RELEASE ORAL 2 TIMES DAILY
Qty: 180 TAB | Refills: 3 | Status: SHIPPED | OUTPATIENT
Start: 2017-09-28 | End: 2018-02-09

## 2017-09-28 ASSESSMENT — PATIENT HEALTH QUESTIONNAIRE - PHQ9
4. FEELING TIRED OR HAVING LITTLE ENERGY: 3
1. LITTLE INTEREST OR PLEASURE IN DOING THINGS: 2
9. THOUGHTS THAT YOU WOULD BE BETTER OFF DEAD, OR OF HURTING YOURSELF: 0
3. TROUBLE FALLING OR STAYING ASLEEP OR SLEEPING TOO MUCH: 0
8. MOVING OR SPEAKING SO SLOWLY THAT OTHER PEOPLE COULD HAVE NOTICED. OR THE OPPOSITE, BEING SO FIGETY OR RESTLESS THAT YOU HAVE BEEN MOVING AROUND A LOT MORE THAN USUAL: 0
SUM OF ALL RESPONSES TO PHQ QUESTIONS 1-9: 13
5. POOR APPETITE OR OVEREATING: 1
7. TROUBLE CONCENTRATING ON THINGS, SUCH AS READING THE NEWSPAPER OR WATCHING TELEVISION: 3
6. FEELING BAD ABOUT YOURSELF - OR THAT YOU ARE A FAILURE OR HAVE LET YOURSELF OR YOUR FAMILY DOWN: 1
2. FEELING DOWN, DEPRESSED, IRRITABLE, OR HOPELESS: 3
SUM OF ALL RESPONSES TO PHQ9 QUESTIONS 1 AND 2: 5

## 2017-09-28 NOTE — ASSESSMENT & PLAN NOTE
Ongoing problem that has improved. She is unable to tell me what medication she is actually using, as her report is different than what I have prescribed. She continues to see counselor every 2 weeks and is really feeling this is beneficial for her.     DIANE 7 Score: 10 (down from 11 2 weeks ago)    Patient Health Questionaire    Little interest or pleasure in doing things?: 2   Feeling down, depressed, or hopeless?: 3  Trouble falling or staying asleep, or sleeping too much? : 0  Feeling tired or having little energy? : 3  Poor appetite or overeating? : 1  Feeling bad about yourself - or that you are a failure or have let yourself or your family down? : 1  Trouble concentrating on things, such as reading the newspaper or watching television? : 3  Moving or speaking so slowly that other people could have noticed.  Or the opposite - being so fidgety or restless that you have been moving around alot more than usual. : 0  Thoughts that you would be better off dead, or of hurting yourself?: 0  Patient Health Questionnaire Score: 13 (same as 13)    If depressive symptoms identified deferred to follow up visit unless specifically addressed in assesment and plan.    Interpretation of PHQ-9 Total Score   Score Severity   1-4 No Depression   5-9 Mild Depression   10-14 Moderate Depression   15-19 Moderately Severe Depression   20-27 Severe Depression

## 2017-09-28 NOTE — ASSESSMENT & PLAN NOTE
"24 hour diet recall: peanut butter and jelly sandwich. This is it for food. Drank 4 glasses of milk and water. States she \"did not have time to eat.\" She did not feel hungry.    Exercise: states she is swimming one hour a day 3 days a week    She has gained 4 lb since our last meeting. Reports the only thing she is doing differently is eating less because she does not have time.     Surgery is planned for November once these visits are complete    We set short term goals last week and she has not reached these goals with her nutrition intake.   "

## 2017-09-29 ENCOUNTER — TELEPHONE (OUTPATIENT)
Dept: MEDICAL GROUP | Facility: PHYSICIAN GROUP | Age: 22
End: 2017-09-29

## 2017-09-29 NOTE — LETTER
September 29, 2017         Patient: Mary Carmen Sanchez   YOB: 1995   Date of Visit: 9/29/2017           To Whom it May Concern:    Mary Carmen Sacnhez was seen in my clinic about every 3 weeks for the past 2 months for nutrition and exercise management. We have been encouraging short term goals and weight loss to allow her to be eligible for gastric surgery.     She has intermittently shown motivation to make changes, although not consistently. I do believe gastric sleeve surgery could be beneficial for her, but I also believe she will need continued lifestyle management counseling and close follow up to ensure success s/p surgery.    I do have clinical notes here if necessary to track her progress.        If you have any questions or concerns, please don't hesitate to call.        Sincerely,           DEVIN Narvaez.  Electronically Signed

## 2017-09-29 NOTE — TELEPHONE ENCOUNTER
Pt is requesting a letter supporting the gastric sleeve surgery.  Please fax it to Dr Galloway's office Attn Bart [phone]

## 2017-10-01 NOTE — PROGRESS NOTES
"Subjective:     Chief Complaint   Patient presents with   • Follow-Up     3 weeks       HPI  Mary Carmen Cristina Sanchez is a 22 y.o. female here today for routine f/u for monitoring of weight loss management     Reactive depression  Ongoing problem that has improved. She is unable to tell me what medication she is actually using, as her report is different than what I have prescribed. She continues to see counselor every 2 weeks and is really feeling this is beneficial for her.     DIANE 7 Score: 10 (down from 11 2 weeks ago)    Patient Health Questionaire    Little interest or pleasure in doing things?: 2   Feeling down, depressed, or hopeless?: 3  Trouble falling or staying asleep, or sleeping too much? : 0  Feeling tired or having little energy? : 3  Poor appetite or overeating? : 1  Feeling bad about yourself - or that you are a failure or have let yourself or your family down? : 1  Trouble concentrating on things, such as reading the newspaper or watching television? : 3  Moving or speaking so slowly that other people could have noticed.  Or the opposite - being so fidgety or restless that you have been moving around alot more than usual. : 0  Thoughts that you would be better off dead, or of hurting yourself?: 0  Patient Health Questionnaire Score: 13 (same as 13)    If depressive symptoms identified deferred to follow up visit unless specifically addressed in assesment and plan.    Interpretation of PHQ-9 Total Score   Score Severity   1-4 No Depression   5-9 Mild Depression   10-14 Moderate Depression   15-19 Moderately Severe Depression   20-27 Severe Depression        Obesity, morbid, BMI 40.0-49.9 (CMS-[facility])  24 hour diet recall: peanut butter and jelly sandwich. This is it for food. Drank 4 glasses of milk and water. States she \"did not have time to eat.\" She did not feel hungry.    Exercise: states she is swimming one hour a day 3 days a week    She has gained 4 lb since our last meeting. Reports the only " "thing she is doing differently is eating less because she does not have time.     Surgery is planned for November once these visits are complete    We set short term goals last week and she has not reached these goals with her nutrition intake.        Diagnoses of Reactive depression and Obesity, morbid, BMI 40.0-49.9 (CMS-[facility]) were pertinent to this visit.    Allergies: Review of patient's allergies indicates no known allergies.  Current medicines (including changes today)  Current Outpatient Prescriptions   Medication Sig Dispense Refill   • buPROPion SR (WELLBUTRIN-SR) 150 MG TABLET SR 12 HR sustained-release tablet Take 1 Tab by mouth 2 times a day. 180 Tab 3   • trazodone (DESYREL) 50 MG Tab TAKE 1/2 TO 1 TABLET ORALLY AT BEDTIME  0     No current facility-administered medications for this visit.        She  has a past medical history of Anxiety and Depression. She also has no past medical history of Asthma.        ROS  As stated in HPI      Objective:     Blood pressure 100/64, pulse 96, temperature 36.3 °C (97.4 °F), resp. rate 12, height 1.626 m (5' 4\"), weight 114.3 kg (252 lb), SpO2 97 %. Body mass index is 43.26 kg/m².  Physical Exam:  General: Alert, oriented, in no acute distress.  Eye contact is good, speech goal directed, affect calm  CNs grossly intact.  HEENT: conjunctiva non-injected, sclera non-icteric, EOMs intact.   Gross hearing intact.  Gait steady.     Assessment and Plan:   Assessment/Plan:  1. Reactive depression  Stable. Not quite yet controlled. She will notify me of what medication she is actually taking therefore then I can determine increasing dose. Continue f/u with counselor.     2. Obesity, morbid, BMI 40.0-49.9 (CMS-[facility])  Ongoing problem, not controlled. She has not reached our short term goals for nutrition. We will keep the same goals for the next 3 weeks. Discussed with patient that these lifelong changes are pertinent to success with her gastric sleep surgery. F/u 3 " weeks    The total time spent seeing the patient in consultation, and formulating an action plan for this visit was 15 minutes.  Greater than 50% of this time was spent face to face counseling, discussing problems documented above, coordinating care and answering questions by the provider.        Follow up:  Return in about 3 weeks (around 10/19/2017).    Educated in proper administration of medication(s) ordered today including safety, possible SE, risks, benefits, rationale and alternatives to therapy.   Supportive care, differential diagnoses, and indications for immediate follow-up discussed with patient.    Pathogenesis of diagnosis discussed including typical length and natural progression.    Instructed to return to clinic or nearest emergency department for any change in condition, further concerns, or worsening of symptoms.  Patient states understanding of the plan of care and discharge instructions.      Please note that this dictation was created using voice recognition software. I have made every reasonable attempt to correct obvious errors, but I expect that there are errors of grammar and possibly content that I did not discover before finalizing the note.    Followup: Return in about 3 weeks (around 10/19/2017). sooner should new symptoms or problems arise.

## 2017-10-12 ENCOUNTER — OFFICE VISIT (OUTPATIENT)
Dept: MEDICAL GROUP | Facility: PHYSICIAN GROUP | Age: 22
End: 2017-10-12
Payer: COMMERCIAL

## 2017-10-12 VITALS
HEART RATE: 86 BPM | SYSTOLIC BLOOD PRESSURE: 108 MMHG | BODY MASS INDEX: 43.36 KG/M2 | WEIGHT: 254 LBS | OXYGEN SATURATION: 96 % | HEIGHT: 64 IN | DIASTOLIC BLOOD PRESSURE: 66 MMHG | RESPIRATION RATE: 16 BRPM | TEMPERATURE: 97.1 F

## 2017-10-12 DIAGNOSIS — E66.01 OBESITY, MORBID, BMI 40.0-49.9 (HCC): ICD-10-CM

## 2017-10-12 DIAGNOSIS — F32.9 REACTIVE DEPRESSION: ICD-10-CM

## 2017-10-12 PROCEDURE — 99212 OFFICE O/P EST SF 10 MIN: CPT | Performed by: NURSE PRACTITIONER

## 2017-10-12 RX ORDER — BUPROPION HYDROCHLORIDE 100 MG/1
TABLET, EXTENDED RELEASE ORAL
COMMUNITY
Start: 2017-09-24 | End: 2017-10-12

## 2017-10-12 ASSESSMENT — PATIENT HEALTH QUESTIONNAIRE - PHQ9
5. POOR APPETITE OR OVEREATING: 3
3. TROUBLE FALLING OR STAYING ASLEEP OR SLEEPING TOO MUCH: 0
7. TROUBLE CONCENTRATING ON THINGS, SUCH AS READING THE NEWSPAPER OR WATCHING TELEVISION: 3
SUM OF ALL RESPONSES TO PHQ QUESTIONS 1-9: 10
8. MOVING OR SPEAKING SO SLOWLY THAT OTHER PEOPLE COULD HAVE NOTICED. OR THE OPPOSITE, BEING SO FIGETY OR RESTLESS THAT YOU HAVE BEEN MOVING AROUND A LOT MORE THAN USUAL: 0
6. FEELING BAD ABOUT YOURSELF - OR THAT YOU ARE A FAILURE OR HAVE LET YOURSELF OR YOUR FAMILY DOWN: 0
4. FEELING TIRED OR HAVING LITTLE ENERGY: 2
9. THOUGHTS THAT YOU WOULD BE BETTER OFF DEAD, OR OF HURTING YOURSELF: 0
SUM OF ALL RESPONSES TO PHQ9 QUESTIONS 1 AND 2: 2
2. FEELING DOWN, DEPRESSED, IRRITABLE, OR HOPELESS: 1
1. LITTLE INTEREST OR PLEASURE IN DOING THINGS: 1

## 2017-10-12 NOTE — ASSESSMENT & PLAN NOTE
Ongoing problem, improving and stable. On bupropion  mg BID. Following psychiatry and continues to see counselor weekly now. They are continuing to work on PTSD.     DIANE 7 Score 9 (improved)    Patient Health Questionaire    Little interest or pleasure in doing things?: 1   Feeling down, depressed, or hopeless?: 1  Trouble falling or staying asleep, or sleeping too much? : 0  Feeling tired or having little energy? : 2  Poor appetite or overeating? : 3  Feeling bad about yourself - or that you are a failure or have let yourself or your family down? : 0  Trouble concentrating on things, such as reading the newspaper or watching television? : 3  Moving or speaking so slowly that other people could have noticed.  Or the opposite - being so fidgety or restless that you have been moving around alot more than usual. : 0  Thoughts that you would be better off dead, or of hurting yourself?: 0  Patient Health Questionnaire Score: 10 (improved)    Interpretation of PHQ-9 Total Score   Score Severity   10-14 Moderate Depression

## 2017-10-12 NOTE — ASSESSMENT & PLAN NOTE
Ongoing problem. Has gained 2 lb in 2 weeks.     She has started a liquid diet with protein shakes t/o the day. She is mixing protein powder with water or almond milk.     She is having dinner as her meal to include a starch, green beans, and some protein.     She saw a nutritionist with the bariatric office. States she fully supports her doing the surgery.    Surgery is planned for November and this is the last visit to complete for nutrition monitoring before surgery can be set.

## 2017-10-17 NOTE — PROGRESS NOTES
Subjective:     Chief Complaint   Patient presents with   • Follow-Up     2 weeks       HPI  Mary Carmen Sanchez is a 22 y.o. female here today for 2 week f/u     Obesity, morbid, BMI 40.0-49.9 (CMS-[facility])  Ongoing problem. Has gained 2 lb in 2 weeks.   She has started a liquid diet with protein shakes t/o the day. She is mixing protein powder with water or almond milk.   She is having dinner as her meal to include a starch, green beans, and some protein.   She saw a nutritionist with the bariatric office. States she fully supports her doing the surgery.  Surgery is planned for November and this is the last visit to complete for nutrition monitoring before surgery can be set.       Reactive depression  Ongoing problem, improving and stable. On bupropion  mg BID. Following psychiatry and continues to see counselor weekly now. They are continuing to work on PTSD.     DIANE 7 Score 9 (improved)    Patient Health Questionaire    Little interest or pleasure in doing things?: 1   Feeling down, depressed, or hopeless?: 1  Trouble falling or staying asleep, or sleeping too much? : 0  Feeling tired or having little energy? : 2  Poor appetite or overeating? : 3  Feeling bad about yourself - or that you are a failure or have let yourself or your family down? : 0  Trouble concentrating on things, such as reading the newspaper or watching television? : 3  Moving or speaking so slowly that other people could have noticed.  Or the opposite - being so fidgety or restless that you have been moving around alot more than usual. : 0  Thoughts that you would be better off dead, or of hurting yourself?: 0  Patient Health Questionnaire Score: 10 (improved)    Interpretation of PHQ-9 Total Score   Score Severity   10-14 Moderate Depression         Diagnoses of Reactive depression and Obesity, morbid, BMI 40.0-49.9 (CMS-[facility]) were pertinent to this visit.    Allergies: Review of patient's allergies indicates no known allergies.  Current  "medicines (including changes today)  Current Outpatient Prescriptions   Medication Sig Dispense Refill   • buPROPion SR (WELLBUTRIN-SR) 150 MG TABLET SR 12 HR sustained-release tablet Take 1 Tab by mouth 2 times a day. 180 Tab 3   • trazodone (DESYREL) 50 MG Tab TAKE 1/2 TO 1 TABLET ORALLY AT BEDTIME  0     No current facility-administered medications for this visit.        She  has a past medical history of Anxiety and Depression. She also has no past medical history of Asthma.        ROS  As stated in HPI      Objective:     Blood pressure 108/66, pulse 86, temperature 36.2 °C (97.1 °F), resp. rate 16, height 1.626 m (5' 4\"), weight 115.2 kg (254 lb), SpO2 96 %. Body mass index is 43.6 kg/m².  Physical Exam:  General: Alert, oriented, in no acute distress.  Eye contact is good, speech goal directed, affect calm and pleasant  CNs grossly intact.  Gross hearing intact.  Gait steady.     Assessment and Plan:   Assessment/Plan:  1. Reactive depression  Stable, continue current medications and f/u with psych and counselor as recommended. F/u with me in 6 months     2. Obesity, morbid, BMI 40.0-49.9 (CMS-[facility])  Not improving, but she is showing more motivation to make changes. Today she voiced to me that she understands this is a lifestyleChange that she needs to implement and she voices desire to live a healthy lifestyle. She is taking it upon herself to make small changes to work towards her goals. She is showing more motivation this week than she was at last visit.       Follow up:  Return in about 6 months (around 4/12/2018).    Educated in proper administration of medication(s) ordered today including safety, possible SE, risks, benefits, rationale and alternatives to therapy.   Supportive care, differential diagnoses, and indications for immediate follow-up discussed with patient.    Pathogenesis of diagnosis discussed including typical length and natural progression.    Instructed to return to clinic or nearest " emergency department for any change in condition, further concerns, or worsening of symptoms.  Patient states understanding of the plan of care and discharge instructions.      Please note that this dictation was created using voice recognition software. I have made every reasonable attempt to correct obvious errors, but I expect that there are errors of grammar and possibly content that I did not discover before finalizing the note.    Followup: Return in about 6 months (around 4/12/2018). sooner should new symptoms or problems arise.

## 2017-11-01 ENCOUNTER — APPOINTMENT (OUTPATIENT)
Dept: MEDICAL GROUP | Facility: PHYSICIAN GROUP | Age: 22
End: 2017-11-01
Payer: COMMERCIAL

## 2017-11-14 ENCOUNTER — OFFICE VISIT (OUTPATIENT)
Dept: MEDICAL GROUP | Facility: PHYSICIAN GROUP | Age: 22
End: 2017-11-14
Payer: COMMERCIAL

## 2017-11-14 VITALS
TEMPERATURE: 97.8 F | DIASTOLIC BLOOD PRESSURE: 82 MMHG | OXYGEN SATURATION: 96 % | HEART RATE: 72 BPM | SYSTOLIC BLOOD PRESSURE: 114 MMHG | WEIGHT: 256 LBS | BODY MASS INDEX: 43.94 KG/M2

## 2017-11-14 DIAGNOSIS — R04.0 FREQUENT NOSEBLEEDS: ICD-10-CM

## 2017-11-14 DIAGNOSIS — E66.01 OBESITY, MORBID, BMI 40.0-49.9 (HCC): ICD-10-CM

## 2017-11-14 PROCEDURE — 99213 OFFICE O/P EST LOW 20 MIN: CPT | Performed by: NURSE PRACTITIONER

## 2017-11-14 RX ORDER — FLUTICASONE PROPIONATE 50 MCG
2 SPRAY, SUSPENSION (ML) NASAL DAILY
Qty: 16 G | Refills: 0 | Status: SHIPPED | OUTPATIENT
Start: 2017-11-14 | End: 2017-12-01

## 2017-11-14 RX ORDER — AMOXICILLIN AND CLAVULANATE POTASSIUM 875; 125 MG/1; MG/1
1 TABLET, FILM COATED ORAL 2 TIMES DAILY
Qty: 14 TAB | Refills: 0 | Status: SHIPPED | OUTPATIENT
Start: 2017-11-14 | End: 2017-12-01

## 2017-11-14 ASSESSMENT — PATIENT HEALTH QUESTIONNAIRE - PHQ9: CLINICAL INTERPRETATION OF PHQ2 SCORE: 0

## 2017-11-14 NOTE — ASSESSMENT & PLAN NOTE
Ongoing problem. She has gained 2 lb since I saw her last time 4 weeks ago, and 4 lb in 6 weeks weight gain.     24 hour diet recall: states she only ate twice. Had PB&J with milk and one bowl of soup from [employer]. This morning had croissant. Has been drinking water and High C fruit punch juice.     Reports being sick over the past few weeks with migraines and dizziness so she has not been exercising. Also had diarrhea. Prior to not feeling well, she was going swimming once a week for an hour on her own doing laps.     Last saw nutrition counselor two weeks. States the counselor spends a lot of time evaluating whether she is mentally okay to do this. She has been told that the foods she eats now will not be good for her after surgery d/t risk of dumping syndrome.

## 2017-11-14 NOTE — LETTER
November 16, 2017         Patient: Mary Carmen Sanchez   YOB: 1995   Date of Visit: 11/14/2017           To Dr. Nguyen:    Mary Carmen Sanchez was seen in my clinic on 11/14/2017. She has been seeing me every 3 weeks on average for obesity management to monitor her efforts in diet/exercise to lose weight prior to bariatric surgery. She continues to eat infrequently, and is often not choosing healthy food options. She has shown motivation to increase physical activity. If patient does proceed with bariatric surgery, I would highly recommend strict follow up for nutrition counseling and monitoring to ensure success with surgery.     If you have any questions or concerns, please don't hesitate to call.        Sincerely,           DEVIN Lamar.  Electronically Signed

## 2017-11-16 NOTE — PROGRESS NOTES
Subjective:     Chief Complaint   Patient presents with   • Follow-Up     weight        HPI  Mary Carmen Cristina Sanchez is a 22 y.o. female here today for obesity counseling for bariatric surgery.     Obesity, morbid, BMI 40.0-49.9 (CMS-[facility])  Ongoing problem. She has gained 2 lb since I saw her last time 4 weeks ago, and 4 lb in 6 weeks weight gain.     24 hour diet recall: states she only ate twice. Had PB&J with milk and one bowl of soup from [employer]. This morning had croissant. Has been drinking water and High C fruit punch juice.     Reports being sick over the past few weeks with migraines and dizziness so she has not been exercising. Also had diarrhea. Prior to not feeling well, she was going swimming once a week for an hour on her own doing laps.     Last saw nutrition counselor two weeks. States the counselor spends a lot of time evaluating whether she is mentally okay to do this. She has been told that the foods she eats now will not be good for her after surgery d/t risk of dumping syndrome.         Additionally, she wants to inform me that she has beginning headaches at the bridge of her nose and those will frequently bleeds for 5-10 minutes. This is been occurring now for a couple of weeks. In between episodes, she has no nasal congestion or rhinorrhea.      Diagnoses of Obesity, morbid, BMI 40.0-49.9 (CMS-[facility]) and Frequent nosebleeds were pertinent to this visit.    Allergies: Patient has no known allergies.  Current medicines (including changes today)  Current Outpatient Prescriptions   Medication Sig Dispense Refill   • amoxicillin-clavulanate (AUGMENTIN) 875-125 MG Tab Take 1 Tab by mouth 2 times a day. 14 Tab 0   • fluticasone (FLONASE) 50 MCG/ACT nasal spray Spray 2 Sprays in nose every day. 16 g 0   • buPROPion SR (WELLBUTRIN-SR) 150 MG TABLET SR 12 HR sustained-release tablet Take 1 Tab by mouth 2 times a day. 180 Tab 3   • trazodone (DESYREL) 50 MG Tab TAKE 1/2 TO 1 TABLET ORALLY AT BEDTIME  0      No current facility-administered medications for this visit.        She  has a past medical history of Anxiety and Depression. She also has no past medical history of Asthma.      ROS  As stated in HPI      Objective:     Blood pressure 114/82, pulse 72, temperature 36.6 °C (97.8 °F), weight 116.1 kg (256 lb), SpO2 96 %. Body mass index is 43.94 kg/m².  Physical Exam:  General: Alert, oriented, in no acute distress.  Eye contact is good, speech goal directed, affect calm  CNs grossly intact.  HEENT: conjunctiva non-injected, sclera non-icteric, EOMs intact. No lid edema or eye drainage.   Gross hearing intact.  Nasal turbinates without edema or drainage.   Gait steady.     Assessment and Plan:   Assessment/Plan:  1. Obesity, morbid, BMI 40.0-49.9 (CMS-[facility])  Not controlled. I do not feel that patient has shown appropriate motivation for diet changes. She yo-yos in her motivation. If patient is to proceed with bariatric surgery, I highly recommend strict follow up with nutrition counselor once weekly.   - Patient identified as having weight management issue.  Appropriate orders and counseling given.    2. Frequent nosebleeds  Enc humidifier in room, Flonase, and oxymetazoline as needed for nosebleeds.        Follow up:  Return s/p bariatric surgery .    Educated in proper administration of medication(s) ordered today including safety, possible SE, risks, benefits, rationale and alternatives to therapy.   Supportive care, differential diagnoses, and indications for immediate follow-up discussed with patient.    Pathogenesis of diagnosis discussed including typical length and natural progression.    Instructed to return to clinic or nearest emergency department for any change in condition, further concerns, or worsening of symptoms.  Patient states understanding of the plan of care and discharge instructions.      Please note that this dictation was created using voice recognition software. I have made every  reasonable attempt to correct obvious errors, but I expect that there are errors of grammar and possibly content that I did not discover before finalizing the note.    Followup: Return s/p bariatric surgery . sooner should new symptoms or problems arise.

## 2017-12-01 ENCOUNTER — OFFICE VISIT (OUTPATIENT)
Dept: MEDICAL GROUP | Facility: PHYSICIAN GROUP | Age: 22
End: 2017-12-01
Payer: COMMERCIAL

## 2017-12-01 VITALS
DIASTOLIC BLOOD PRESSURE: 72 MMHG | BODY MASS INDEX: 44.39 KG/M2 | HEART RATE: 73 BPM | SYSTOLIC BLOOD PRESSURE: 104 MMHG | RESPIRATION RATE: 12 BRPM | WEIGHT: 260 LBS | HEIGHT: 64 IN | TEMPERATURE: 96.6 F | OXYGEN SATURATION: 96 %

## 2017-12-01 DIAGNOSIS — Z23 NEED FOR VACCINATION: ICD-10-CM

## 2017-12-01 DIAGNOSIS — E66.01 OBESITY, MORBID, BMI 40.0-49.9 (HCC): ICD-10-CM

## 2017-12-01 PROCEDURE — 90686 IIV4 VACC NO PRSV 0.5 ML IM: CPT | Performed by: NURSE PRACTITIONER

## 2017-12-01 PROCEDURE — 90471 IMMUNIZATION ADMIN: CPT | Performed by: NURSE PRACTITIONER

## 2017-12-01 PROCEDURE — 99212 OFFICE O/P EST SF 10 MIN: CPT | Mod: 25 | Performed by: NURSE PRACTITIONER

## 2017-12-01 RX ORDER — BUPROPION HYDROCHLORIDE 100 MG/1
TABLET, EXTENDED RELEASE ORAL
COMMUNITY
Start: 2017-10-28 | End: 2017-12-01

## 2017-12-01 NOTE — PROGRESS NOTES
"Subjective:     Chief Complaint   Patient presents with   • Obesity       HPI  Mary Carmen Sanchez is a 22 y.o. female here today for obesity management     Obesity, morbid, BMI 40.0-49.9 (CMS-[facility])  Unfortunately, she continues to gain weight despite efforts to increase physical activity and manage nutrition.   Reports she met with her nutritionist through bariatric clinic 2 days ago. She has put patient on a different diet because she feels patient is not disciplined enough to pack appropriate lunches.   She has placed her on all protein shakes during the day with one meal in the evening. Reports making shakes with spinach, egg, protein powder, and almond milk. She has encouraged her drink water before and after to help curb appetite. This will be her diet until surgery.   She is swimming at the gym twice weekly for an hour.   Drinking at least 40 ounces of water daily.     Diagnoses of Obesity, morbid, BMI 40.0-49.9 (CMS-[facility]) and Need for vaccination were pertinent to this visit.    Allergies: Patient has no known allergies.  Current medicines (including changes today)  Current Outpatient Prescriptions   Medication Sig Dispense Refill   • buPROPion SR (WELLBUTRIN-SR) 150 MG TABLET SR 12 HR sustained-release tablet Take 1 Tab by mouth 2 times a day. 180 Tab 3   • trazodone (DESYREL) 50 MG Tab TAKE 1/2 TO 1 TABLET ORALLY AT BEDTIME  0     No current facility-administered medications for this visit.        She  has a past medical history of Anxiety and Depression. She also has no past medical history of Asthma.      ROS  As stated in HPI      Objective:     Blood pressure 104/72, pulse 73, temperature 35.9 °C (96.6 °F), resp. rate 12, height 1.626 m (5' 4\"), weight 117.9 kg (260 lb), SpO2 96 %. Body mass index is 44.63 kg/m².  Physical Exam:  General: Alert, oriented, in no acute distress.  Eye contact is good, speech goal directed, affect calm  CNs grossly intact.  Gross hearing intact.  Gait steady. "     Assessment and Plan:   Assessment/Plan:  1. Obesity, morbid, BMI 40.0-49.9 (CMS-[facility])  Not controlled. Patient continues to see nutrition counselor regularly and is continuing with physical activity. Has had a hard time making appropriate diet changes, therefore now on protein shake diet. She reports motivation to stay away from foods to allow her to be on track for this surgery. She is aware that we are concerned about consistency with diet and reports she will have strict f/u on nutrition counseling s/p bariatric surgery.     2. Need for vaccination  I have placed the below orders and discussed them with an approved delegating provider. The MA is performing the below orders under the direction of Dr. Ring  - INFLUENZA VACCINE QUAD INJ >3Y(PF)         Follow up:  Return s/p bariatric surgery .    Educated in proper administration of medication(s) ordered today including safety, possible SE, risks, benefits, rationale and alternatives to therapy.   Supportive care, differential diagnoses, and indications for immediate follow-up discussed with patient.    Pathogenesis of diagnosis discussed including typical length and natural progression.    Instructed to return to clinic or nearest emergency department for any change in condition, further concerns, or worsening of symptoms.  Patient states understanding of the plan of care and discharge instructions.      Please note that this dictation was created using voice recognition software. I have made every reasonable attempt to correct obvious errors, but I expect that there are errors of grammar and possibly content that I did not discover before finalizing the note.    Followup: Return s/p bariatric surgery . sooner should new symptoms or problems arise.

## 2017-12-01 NOTE — ASSESSMENT & PLAN NOTE
Unfortunately, she continues to gain weight despite efforts to increase physical activity and manage nutrition.   Reports she met with her nutritionist through bariatric clinic 2 days ago. She has put patient on a different diet because she feels patient is not disciplined enough to pack appropriate lunches.   She has placed her on all protein shakes during the day with one meal in the evening. Reports making shakes with spinach, egg, protein powder, and almond milk. She has encouraged her drink water before and after to help curb appetite. This will be her diet until surgery.   She is swimming at the gym twice weekly for an hour.   Drinking at least 40 ounces of water daily.

## 2018-01-27 ENCOUNTER — HOSPITAL ENCOUNTER (EMERGENCY)
Facility: MEDICAL CENTER | Age: 23
End: 2018-01-28
Attending: EMERGENCY MEDICINE
Payer: COMMERCIAL

## 2018-01-27 DIAGNOSIS — R10.31 RIGHT LOWER QUADRANT ABDOMINAL PAIN: Primary | ICD-10-CM

## 2018-01-27 DIAGNOSIS — R19.7 NAUSEA VOMITING AND DIARRHEA: ICD-10-CM

## 2018-01-27 DIAGNOSIS — I88.0 MESENTERIC ADENITIS: ICD-10-CM

## 2018-01-27 DIAGNOSIS — R11.2 NAUSEA VOMITING AND DIARRHEA: ICD-10-CM

## 2018-01-27 LAB
HCG UR QL: NEGATIVE
SP GR UR REFRACTOMETRY: 1.03

## 2018-01-27 PROCEDURE — 81025 URINE PREGNANCY TEST: CPT

## 2018-01-27 PROCEDURE — 99285 EMERGENCY DEPT VISIT HI MDM: CPT

## 2018-01-27 PROCEDURE — 81003 URINALYSIS AUTO W/O SCOPE: CPT

## 2018-01-27 ASSESSMENT — PAIN SCALES - GENERAL: PAINLEVEL_OUTOF10: 6

## 2018-01-28 ENCOUNTER — APPOINTMENT (OUTPATIENT)
Dept: RADIOLOGY | Facility: MEDICAL CENTER | Age: 23
End: 2018-01-28
Attending: EMERGENCY MEDICINE
Payer: COMMERCIAL

## 2018-01-28 VITALS
HEART RATE: 79 BPM | WEIGHT: 259.7 LBS | HEIGHT: 64 IN | BODY MASS INDEX: 44.34 KG/M2 | RESPIRATION RATE: 18 BRPM | TEMPERATURE: 98.3 F | OXYGEN SATURATION: 99 % | DIASTOLIC BLOOD PRESSURE: 63 MMHG | SYSTOLIC BLOOD PRESSURE: 117 MMHG

## 2018-01-28 LAB
ALBUMIN SERPL BCP-MCNC: 5.4 G/DL (ref 3.2–4.9)
ALBUMIN/GLOB SERPL: 1.9 G/DL
ALP SERPL-CCNC: 47 U/L (ref 30–99)
ALT SERPL-CCNC: 29 U/L (ref 2–50)
ANION GAP SERPL CALC-SCNC: 9 MMOL/L (ref 0–11.9)
APPEARANCE UR: CLEAR
AST SERPL-CCNC: 21 U/L (ref 12–45)
BASOPHILS # BLD AUTO: 0.6 % (ref 0–1.8)
BASOPHILS # BLD: 0.06 K/UL (ref 0–0.12)
BILIRUB SERPL-MCNC: 0.5 MG/DL (ref 0.1–1.5)
BILIRUB UR QL STRIP.AUTO: NEGATIVE
BUN SERPL-MCNC: 13 MG/DL (ref 8–22)
CALCIUM SERPL-MCNC: 9.4 MG/DL (ref 8.5–10.5)
CHLORIDE SERPL-SCNC: 102 MMOL/L (ref 96–112)
CO2 SERPL-SCNC: 27 MMOL/L (ref 20–33)
COLOR UR: YELLOW
CREAT SERPL-MCNC: 0.84 MG/DL (ref 0.5–1.4)
CULTURE IF INDICATED INDCX: NO UA CULTURE
EOSINOPHIL # BLD AUTO: 0.1 K/UL (ref 0–0.51)
EOSINOPHIL NFR BLD: 1 % (ref 0–6.9)
ERYTHROCYTE [DISTWIDTH] IN BLOOD BY AUTOMATED COUNT: 42.4 FL (ref 35.9–50)
GLOBULIN SER CALC-MCNC: 2.9 G/DL (ref 1.9–3.5)
GLUCOSE SERPL-MCNC: 98 MG/DL (ref 65–99)
GLUCOSE UR STRIP.AUTO-MCNC: NEGATIVE MG/DL
HCT VFR BLD AUTO: 46.4 % (ref 37–47)
HGB BLD-MCNC: 15.6 G/DL (ref 12–16)
IMM GRANULOCYTES # BLD AUTO: 0.03 K/UL (ref 0–0.11)
IMM GRANULOCYTES NFR BLD AUTO: 0.3 % (ref 0–0.9)
KETONES UR STRIP.AUTO-MCNC: 15 MG/DL
LEUKOCYTE ESTERASE UR QL STRIP.AUTO: NEGATIVE
LIPASE SERPL-CCNC: 22 U/L (ref 11–82)
LYMPHOCYTES # BLD AUTO: 2.61 K/UL (ref 1–4.8)
LYMPHOCYTES NFR BLD: 26.3 % (ref 22–41)
MCH RBC QN AUTO: 31.2 PG (ref 27–33)
MCHC RBC AUTO-ENTMCNC: 33.6 G/DL (ref 33.6–35)
MCV RBC AUTO: 92.8 FL (ref 81.4–97.8)
MICRO URNS: ABNORMAL
MONOCYTES # BLD AUTO: 0.78 K/UL (ref 0–0.85)
MONOCYTES NFR BLD AUTO: 7.9 % (ref 0–13.4)
NEUTROPHILS # BLD AUTO: 6.34 K/UL (ref 2–7.15)
NEUTROPHILS NFR BLD: 63.9 % (ref 44–72)
NITRITE UR QL STRIP.AUTO: NEGATIVE
NRBC # BLD AUTO: 0 K/UL
NRBC BLD-RTO: 0 /100 WBC
PH UR STRIP.AUTO: 5 [PH]
PLATELET # BLD AUTO: 300 K/UL (ref 164–446)
PMV BLD AUTO: 9.3 FL (ref 9–12.9)
POTASSIUM SERPL-SCNC: 3.6 MMOL/L (ref 3.6–5.5)
PROT SERPL-MCNC: 8.3 G/DL (ref 6–8.2)
PROT UR QL STRIP: NEGATIVE MG/DL
RBC # BLD AUTO: 5 M/UL (ref 4.2–5.4)
RBC UR QL AUTO: NEGATIVE
SODIUM SERPL-SCNC: 138 MMOL/L (ref 135–145)
SP GR UR STRIP.AUTO: 1.03
UROBILINOGEN UR STRIP.AUTO-MCNC: 0.2 MG/DL
WBC # BLD AUTO: 9.9 K/UL (ref 4.8–10.8)

## 2018-01-28 PROCEDURE — 83690 ASSAY OF LIPASE: CPT

## 2018-01-28 PROCEDURE — 700111 HCHG RX REV CODE 636 W/ 250 OVERRIDE (IP): Performed by: EMERGENCY MEDICINE

## 2018-01-28 PROCEDURE — 96375 TX/PRO/DX INJ NEW DRUG ADDON: CPT

## 2018-01-28 PROCEDURE — 74177 CT ABD & PELVIS W/CONTRAST: CPT

## 2018-01-28 PROCEDURE — 85025 COMPLETE CBC W/AUTO DIFF WBC: CPT

## 2018-01-28 PROCEDURE — 96374 THER/PROPH/DIAG INJ IV PUSH: CPT

## 2018-01-28 PROCEDURE — 700117 HCHG RX CONTRAST REV CODE 255: Performed by: EMERGENCY MEDICINE

## 2018-01-28 PROCEDURE — 36415 COLL VENOUS BLD VENIPUNCTURE: CPT

## 2018-01-28 PROCEDURE — 80053 COMPREHEN METABOLIC PANEL: CPT

## 2018-01-28 RX ORDER — MORPHINE SULFATE 4 MG/ML
4 INJECTION, SOLUTION INTRAMUSCULAR; INTRAVENOUS ONCE
Status: COMPLETED | OUTPATIENT
Start: 2018-01-28 | End: 2018-01-28

## 2018-01-28 RX ORDER — ONDANSETRON 2 MG/ML
4 INJECTION INTRAMUSCULAR; INTRAVENOUS ONCE
Status: COMPLETED | OUTPATIENT
Start: 2018-01-28 | End: 2018-01-28

## 2018-01-28 RX ORDER — DICYCLOMINE HCL 20 MG
20 TABLET ORAL EVERY 6 HOURS PRN
Qty: 10 TAB | Refills: 0 | Status: SHIPPED | OUTPATIENT
Start: 2018-01-28 | End: 2018-02-09

## 2018-01-28 RX ORDER — ONDANSETRON 4 MG/1
4 TABLET, ORALLY DISINTEGRATING ORAL EVERY 4 HOURS PRN
Qty: 10 TAB | Refills: 0 | Status: SHIPPED | OUTPATIENT
Start: 2018-01-28 | End: 2018-02-09

## 2018-01-28 RX ADMIN — IOHEXOL 100 ML: 350 INJECTION, SOLUTION INTRAVENOUS at 01:41

## 2018-01-28 RX ADMIN — ONDANSETRON 4 MG: 2 INJECTION INTRAMUSCULAR; INTRAVENOUS at 00:45

## 2018-01-28 RX ADMIN — MORPHINE SULFATE 4 MG: 4 INJECTION INTRAVENOUS at 00:48

## 2018-01-28 ASSESSMENT — PAIN SCALES - GENERAL
PAINLEVEL_OUTOF10: 2
PAINLEVEL_OUTOF10: 2

## 2018-01-28 NOTE — ED TRIAGE NOTES
"Mary Carmen Cristina Sanchez  23 y.o. female  Chief Complaint   Patient presents with   • Blood in Vomit      Pt. states she has had dark bloody emesis that is brown in color 2-3 times a day for the past three days.    • RLQ Pain     Pt. states she has been having RLQ pain for the past three days. Pt. states she has had a fever off and on for the past three days. Pt. is afebrile in triage. Pt. states fever comes and goes. Pt. states the pain is sharp in nature   • Fever   • Diarrhea     Pt. states she has had diarrhea for the past three days as well.     /63   Pulse 85   Temp 36.8 °C (98.3 °F)   Resp 18   Ht 1.626 m (5' 4\")   Wt 117.8 kg (259 lb 11.2 oz)   SpO2 96%   BMI 44.58 kg/m²     Pt amb to triage with steady gait for above complaint.  Pt. Provided urine cup for urine specimen.  Pt is alert and oriented, speaking in full sentences, follows commands and responds appropriately to questions. NAD. Resp are even and unlabored.  Pt placed in lobby. Pt educated on triage process. Pt encouraged to alert staff for any changes.  "

## 2018-01-28 NOTE — ED PROVIDER NOTES
ED Provider Note    CHIEF COMPLAINT  Chief Complaint   Patient presents with   • Blood in Vomit      Pt. states she has had dark bloody emesis that is brown in color 2-3 times a day for the past three days.    • RLQ Pain     Pt. states she has been having RLQ pain for the past three days. Pt. states she has had a fever off and on for the past three days. Pt. is afebrile in triage. Pt. states fever comes and goes. Pt. states the pain is sharp in nature   • Fever   • Diarrhea     Pt. states she has had diarrhea for the past three days as well.       HPI  Who ambulates to the emergency room if abdominal pain.  Patient describes 3 days of lower quadrant abdominal pain, 4/10 constant pressure and otherwise non-radiating. Nausea with multiple episodes of vomiting, 3 times a day. Patient is concerned for dark emesis, possibly blood. Denies history of the same, denies history of ulcer or previous GI bleed. Multiple episodes of diarrhea, for today and each of the 3 days previous, also dark, without bright red blood or mucus. No dysuria, hematuria, frequency or urgency. No flank pain. No vaginal bleeding or other discharge. IUD in place. Fever up to 102.1 at home. No medications taken for discomfort. Decreased appetite, patient states she has been sticking to a liquid diet but continues to have vomiting. Denies sick with similar contacts. Denies recent travel. Denies history some symptoms.    REVIEW OF SYSTEMS  See HPI for further details. All other systems are negative.     PAST MEDICAL HISTORY   has a past medical history of Anxiety and Depression.    SOCIAL HISTORY  Social History     Social History Main Topics   • Smoking status: Never Smoker   • Smokeless tobacco: Never Used   • Alcohol use No   • Drug use: No   • Sexual activity: Not Currently     Partners: Male     Birth control/ protection: IUD   No alcohol tobacco or illicit drug use.    SURGICAL HISTORY   has a past surgical history that includes tonsillectomy  "(2000); cervical cerclage (2/20/2015); other; and primary c section (3/28/2015).    CURRENT MEDICATIONS  Home Medications     Reviewed by Aster Daigle R.N. (Registered Nurse) on 01/28/18 at 0110  Med List Status: Partial   Medication Last Dose Status   buPROPion SR (WELLBUTRIN-SR) 150 MG TABLET SR 12 HR sustained-release tablet 12/1/2017 Active   trazodone (DESYREL) 50 MG Tab 12/1/2017 Active                ALLERGIES  No Known Allergies    PHYSICAL EXAM  VITAL SIGNS: /63   Pulse 79   Temp 36.8 °C (98.3 °F)   Resp 18   Ht 1.626 m (5' 4\")   Wt 117.8 kg (259 lb 11.2 oz)   SpO2 99%   BMI 44.58 kg/m²   Pulse ox interpretation: I interpret this pulse ox as normal.  Constitutional: Alert in no apparent distress. Obese.  HENT: Normocephalic, atraumatic. Bilateral external ears normal, Nose normal. Moist mucous membranes.    Eyes: Pupils are equal and reactive, Conjunctiva normal.   Neck: Normal range of motion, Supple.  Lymphatic: No lymphadenopathy noted.   Cardiovascular: Regular rate and rhythm, no murmurs. Distal pulses intact.  No peripheral edema.  Thorax & Lungs: Normal breath sounds.  No wheezing/rales/ronchi. No increased work of breathing.  Abdomen: Obese, Soft, non-distended. Mild tenderness to palpation in the right lower quadrant without rebound, guarding or peritonitis. No palpable or pulsatile masses. No peritoneal signs. No CVA tenderness.  Skin: Warm, Dry, No erythema, No rash.   Musculoskeletal: Good range of motion in all major joints.  Neurologic: Alert , no gross focal deficit noted.  Psychiatric: Affect normal, Judgment normal, Mood normal.       DIAGNOSTIC STUDIES / PROCEDURES    LABS  Results for orders placed or performed during the hospital encounter of 01/27/18   CBC WITH DIFFERENTIAL   Result Value Ref Range    WBC 9.9 4.8 - 10.8 K/uL    RBC 5.00 4.20 - 5.40 M/uL    Hemoglobin 15.6 12.0 - 16.0 g/dL    Hematocrit 46.4 37.0 - 47.0 %    MCV 92.8 81.4 - 97.8 fL    MCH 31.2 27.0 - " 33.0 pg    MCHC 33.6 33.6 - 35.0 g/dL    RDW 42.4 35.9 - 50.0 fL    Platelet Count 300 164 - 446 K/uL    MPV 9.3 9.0 - 12.9 fL    Neutrophils-Polys 63.90 44.00 - 72.00 %    Lymphocytes 26.30 22.00 - 41.00 %    Monocytes 7.90 0.00 - 13.40 %    Eosinophils 1.00 0.00 - 6.90 %    Basophils 0.60 0.00 - 1.80 %    Immature Granulocytes 0.30 0.00 - 0.90 %    Nucleated RBC 0.00 /100 WBC    Neutrophils (Absolute) 6.34 2.00 - 7.15 K/uL    Lymphs (Absolute) 2.61 1.00 - 4.80 K/uL    Monos (Absolute) 0.78 0.00 - 0.85 K/uL    Eos (Absolute) 0.10 0.00 - 0.51 K/uL    Baso (Absolute) 0.06 0.00 - 0.12 K/uL    Immature Granulocytes (abs) 0.03 0.00 - 0.11 K/uL    NRBC (Absolute) 0.00 K/uL   COMP METABOLIC PANEL   Result Value Ref Range    Sodium 138 135 - 145 mmol/L    Potassium 3.6 3.6 - 5.5 mmol/L    Chloride 102 96 - 112 mmol/L    Co2 27 20 - 33 mmol/L    Anion Gap 9.0 0.0 - 11.9    Glucose 98 65 - 99 mg/dL    Bun 13 8 - 22 mg/dL    Creatinine 0.84 0.50 - 1.40 mg/dL    Calcium 9.4 8.5 - 10.5 mg/dL    AST(SGOT) 21 12 - 45 U/L    ALT(SGPT) 29 2 - 50 U/L    Alkaline Phosphatase 47 30 - 99 U/L    Total Bilirubin 0.5 0.1 - 1.5 mg/dL    Albumin 5.4 (H) 3.2 - 4.9 g/dL    Total Protein 8.3 (H) 6.0 - 8.2 g/dL    Globulin 2.9 1.9 - 3.5 g/dL    A-G Ratio 1.9 g/dL   LIPASE   Result Value Ref Range    Lipase 22 11 - 82 U/L   URINALYSIS,CULTURE IF INDICATED   Result Value Ref Range    Color Yellow     Character Clear     Specific Gravity 1.029 <1.035    Ph 5.0 5.0 - 8.0    Glucose Negative Negative mg/dL    Ketones 15 (A) Negative mg/dL    Protein Negative Negative mg/dL    Bilirubin Negative Negative    Urobilinogen, Urine 0.2 Negative    Nitrite Negative Negative    Leukocyte Esterase Negative Negative    Occult Blood Negative Negative    Micro Urine Req see below     Culture Indicated No UA Culture   BETA-HCG QUALITATIVE URINE   Result Value Ref Range    Beta-Hcg Urine Negative Negative   REFRACTOMETER SG   Result Value Ref Range    Specific  "[place] 1.030    ESTIMATED GFR   Result Value Ref Range    GFR If African American >60 >60 mL/min/1.73 m 2    GFR If Non African American >60 >60 mL/min/1.73 m 2     RADIOLOGY  CT-ABDOMEN-PELVIS WITH   Final Result         1.  No acute abnormality.   2.  Mildly prominent right lower quadrant lymph nodes, consider mesenteric adenitis or other causes of adenopathy as clinically appropriate.   3.  Hepatomegaly              COURSE & MEDICAL DECISION MAKING  Nursing notes and vital signs were reviewed. (See chart for details)  The patients records were reviewed, history was obtained from the patient;     ED evaluation for right lower quadrant abdominal pain is unrevealing, however there is some enlarged mesenteric lymph nodes concerning for mesenteric adenitis. Symptomatology may be viral in origin, follow-up is recommended to no resolution of the adenopathy. Otherwise CT is unrevealing. Symptomatology is atypical for ovarian torsion or ureteral colic. No hematuria. No vaginal bleeding. Labs unremarkable. No leukocytosis. Normal liver and kidney function. No anemia, guaiac positive, reported \"darker brown\" hematemesis and stool. Less likely GI bleed. Hemodynamically stable without fever or tachycardia, never hypotensive. Urinalysis is unrevealing. Pregnancy negative. Pain improved after 1 dose of morphine. Tolerating oral fluids without nausea or vomiting.    Patient is stable for discharge at this time, anticipatory guidance provided, Zofran and Bentyl for discomfort, close follow-up is encouraged, and strict ED return instructions have been detailed. Patient is agreeable to the disposition and plan.    Patient's blood pressure was elevated in the emergency department, and has been referred to primary care for close monitoring.    FINAL IMPRESSION  (R10.31) Right lower quadrant abdominal pain  (primary encounter diagnosis)  (R11.2,  R19.7) Nausea vomiting and diarrhea  (I88.0) Mesenteric adenitis      Electronically " signed by: Dottie Stauffer, 1/28/2018 1:06 AM      This dictation was created using voice recognition software. The accuracy of the dictation is limited to the abilities of the software. I expect there may be some errors of grammar and possibly content. The nursing notes were reviewed and certain aspects of this information were incorporated into this note.

## 2018-01-28 NOTE — DISCHARGE INSTRUCTIONS
Follow-up with primary care on Monday for reevaluation, medication management and follow-up for enlarged lymph nodes noted in your abdomen on CAT scan.    Zofran, oral dissolving tablet, every 4-6 hours as needed for nausea or vomiting.  Bentyl every 6 hours as needed for abdominal cramping.    Clear liquid diet for 1-2 days, then advance to bland as tolerated.    Return emergency department for persistent or worsening abdominal pain, intractable vomiting, bloody emesis or stools, fever or other new concerns.    Abdominal Pain (Nonspecific)  Your exam might not show the exact reason you have abdominal pain. Since there are many different causes of abdominal pain, another checkup and more tests may be needed. It is very important to follow up for lasting (persistent) or worsening symptoms. A possible cause of abdominal pain in any person who still has his or her appendix is acute appendicitis. Appendicitis is often hard to diagnose. Normal blood tests, urine tests, ultrasound, and CT scans do not completely rule out early appendicitis or other causes of abdominal pain. Sometimes, only the changes that happen over time will allow appendicitis and other causes of abdominal pain to be determined. Other potential problems that may require surgery may also take time to become more apparent. Because of this, it is important that you follow all of the instructions below.  HOME CARE INSTRUCTIONS   · Rest as much as possible.   · Do not eat solid food until your pain is gone.   · While adults or children have pain: A diet of water, weak decaffeinated tea, broth or bouillon, gelatin, oral rehydration solutions (ORS), frozen ice pops, or ice chips may be helpful.   · When pain is gone in adults or children: Start a light diet (dry toast, crackers, applesauce, or white rice). Increase the diet slowly as long as it does not bother you. Eat no dairy products (including cheese and eggs) and no spicy, fatty, fried, or high-fiber  foods.   · Use no alcohol, caffeine, or cigarettes.   · Take your regular medicines unless your caregiver told you not to.   · Take any prescribed medicine as directed.   · Only take over-the-counter or prescription medicines for pain, discomfort, or fever as directed by your caregiver. Do not give aspirin to children.   If your caregiver has given you a follow-up appointment, it is very important to keep that appointment. Not keeping the appointment could result in a permanent injury and/or lasting (chronic) pain and/or disability. If there is any problem keeping the appointment, you must call to reschedule.   SEEK IMMEDIATE MEDICAL CARE IF:   · Your pain is not gone in 24 hours.   · Your pain becomes worse, changes location, or feels different.   · You or your child has an oral temperature above 102° F (38.9° C), not controlled by medicine.   · Your baby is older than 3 months with a rectal temperature of 102° F (38.9° C) or higher.   · Your baby is 3 months old or younger with a rectal temperature of 100.4° F (38° C) or higher.   · You have shaking chills.   · You keep throwing up (vomiting) or cannot drink liquids.   · There is blood in your vomit or you see blood in your bowel movements.   · Your bowel movements become dark or black.   · You have frequent bowel movements.   · Your bowel movements stop (become blocked) or you cannot pass gas.   · You have bloody, frequent, or painful urination.   · You have yellow discoloration in the skin or whites of the eyes.   · Your stomach becomes bloated or bigger.   · You have dizziness or fainting.   · You have chest or back pain.   MAKE SURE YOU:   · Understand these instructions.   · Will watch your condition.   · Will get help right away if you are not doing well or get worse.   Document Released: 12/18/2006 Document Revised: 03/11/2013 Document Reviewed: 11/15/2010  [employer]® Patient Information ©2013 [employer].    Nausea and Vomiting  Nausea means you feel sick  to your stomach. Throwing up (vomiting) is a reflex where stomach contents come out of your mouth.  HOME CARE   · Take medicine as told by your doctor.  · Do not force yourself to eat. However, you do need to drink fluids.  · If you feel like eating, eat a normal diet as told by your doctor.  ¨ Eat rice, wheat, potatoes, bread, lean meats, yogurt, fruits, and vegetables.  ¨ Avoid high-fat foods.  · Drink enough fluids to keep your pee (urine) clear or pale yellow.  · Ask your doctor how to replace body fluid losses (rehydrate). Signs of body fluid loss (dehydration) include:  ¨ Feeling very thirsty.  ¨ Dry lips and mouth.  ¨ Feeling dizzy.  ¨ Dark pee.  ¨ Peeing less than normal.  ¨ Feeling confused.  ¨ Fast breathing or heart rate.  GET HELP RIGHT AWAY IF:   · You have blood in your throw up.  · You have black or bloody poop (stool).  · You have a bad headache or stiff neck.  · You feel confused.  · You have bad belly (abdominal) pain.  · You have chest pain or trouble breathing.  · You do not pee at least once every 8 hours.  · You have cold, clammy skin.  · You keep throwing up after 24 to 48 hours.  · You have a fever.  MAKE SURE YOU:   · Understand these instructions.  · Will watch your condition.  · Will get help right away if you are not doing well or get worse.     This information is not intended to replace advice given to you by your health care provider. Make sure you discuss any questions you have with your health care provider.     Document Released: 06/05/2009 Document Revised: 03/11/2013 Document Reviewed: 05/18/2012  [employer] Patient Education ©2016 [employer]

## 2018-01-28 NOTE — ED NOTES
Discharge instructions given. All questions answered. Prescriptions given x. Pt to follow-up with PCP. Pt verbalized understanding. All belongings with pt. Pt self ambulatory to lobby.

## 2018-02-09 ENCOUNTER — HOSPITAL ENCOUNTER (OUTPATIENT)
Dept: RADIOLOGY | Facility: MEDICAL CENTER | Age: 23
DRG: 621 | End: 2018-02-09
Attending: SURGERY | Admitting: SURGERY
Payer: COMMERCIAL

## 2018-02-09 DIAGNOSIS — Z01.811 PRE-OPERATIVE RESPIRATORY EXAMINATION: ICD-10-CM

## 2018-02-09 DIAGNOSIS — Z01.812 PRE-OPERATIVE LABORATORY EXAMINATION: ICD-10-CM

## 2018-02-09 DIAGNOSIS — Z01.810 PRE-OPERATIVE CARDIOVASCULAR EXAMINATION: ICD-10-CM

## 2018-02-09 LAB
25(OH)D3 SERPL-MCNC: 9 NG/ML (ref 30–100)
CHOLEST SERPL-MCNC: 141 MG/DL (ref 100–199)
EKG IMPRESSION: NORMAL
FOLATE SERPL-MCNC: 21.5 NG/ML
HDLC SERPL-MCNC: 40 MG/DL
IRON SATN MFR SERPL: 27 % (ref 15–55)
IRON SERPL-MCNC: 109 UG/DL (ref 40–170)
LDLC SERPL CALC-MCNC: 86 MG/DL
PREALB SERPL-MCNC: 23 MG/DL (ref 18–38)
PTH-INTACT SERPL-MCNC: 69.1 PG/ML (ref 14–72)
TIBC SERPL-MCNC: 405 UG/DL (ref 250–450)
TRIGL SERPL-MCNC: 74 MG/DL (ref 0–149)
VIT B12 SERPL-MCNC: 349 PG/ML (ref 211–911)

## 2018-02-09 PROCEDURE — 83540 ASSAY OF IRON: CPT

## 2018-02-09 PROCEDURE — 82306 VITAMIN D 25 HYDROXY: CPT

## 2018-02-09 PROCEDURE — 84134 ASSAY OF PREALBUMIN: CPT

## 2018-02-09 PROCEDURE — 83970 ASSAY OF PARATHORMONE: CPT

## 2018-02-09 PROCEDURE — 84425 ASSAY OF VITAMIN B-1: CPT

## 2018-02-09 PROCEDURE — 82607 VITAMIN B-12: CPT

## 2018-02-09 PROCEDURE — 36415 COLL VENOUS BLD VENIPUNCTURE: CPT

## 2018-02-09 PROCEDURE — 93010 ELECTROCARDIOGRAM REPORT: CPT | Performed by: INTERNAL MEDICINE

## 2018-02-09 PROCEDURE — 93005 ELECTROCARDIOGRAM TRACING: CPT

## 2018-02-09 PROCEDURE — 82746 ASSAY OF FOLIC ACID SERUM: CPT

## 2018-02-09 PROCEDURE — 80061 LIPID PANEL: CPT

## 2018-02-09 PROCEDURE — 71046 X-RAY EXAM CHEST 2 VIEWS: CPT

## 2018-02-09 PROCEDURE — 83550 IRON BINDING TEST: CPT

## 2018-02-13 ENCOUNTER — HOSPITAL ENCOUNTER (INPATIENT)
Facility: MEDICAL CENTER | Age: 23
LOS: 2 days | DRG: 621 | End: 2018-02-15
Attending: SURGERY | Admitting: SURGERY
Payer: COMMERCIAL

## 2018-02-13 DIAGNOSIS — G89.18 POST-OP PAIN: Primary | ICD-10-CM

## 2018-02-13 LAB
HCG UR QL: NEGATIVE
SP GR UR REFRACTOMETRY: 1.02
VIT B1 BLD-MCNC: 113 NMOL/L (ref 70–180)

## 2018-02-13 PROCEDURE — 160036 HCHG PACU - EA ADDL 30 MINS PHASE I: Performed by: SURGERY

## 2018-02-13 PROCEDURE — 81025 URINE PREGNANCY TEST: CPT

## 2018-02-13 PROCEDURE — 500865 HCHG NEEDLE, SPINAL 20G/22G: Performed by: SURGERY

## 2018-02-13 PROCEDURE — 88305 TISSUE EXAM BY PATHOLOGIST: CPT

## 2018-02-13 PROCEDURE — 501838 HCHG SUTURE GENERAL: Performed by: SURGERY

## 2018-02-13 PROCEDURE — 0DB64Z3 EXCISION OF STOMACH, PERCUTANEOUS ENDOSCOPIC APPROACH, VERTICAL: ICD-10-PCS | Performed by: SURGERY

## 2018-02-13 PROCEDURE — 700111 HCHG RX REV CODE 636 W/ 250 OVERRIDE (IP)

## 2018-02-13 PROCEDURE — 700101 HCHG RX REV CODE 250

## 2018-02-13 PROCEDURE — 501571 HCHG TROCAR, SEPARATOR 12X100: Performed by: SURGERY

## 2018-02-13 PROCEDURE — 160048 HCHG OR STATISTICAL LEVEL 1-5: Performed by: SURGERY

## 2018-02-13 PROCEDURE — A6402 STERILE GAUZE <= 16 SQ IN: HCPCS | Performed by: SURGERY

## 2018-02-13 PROCEDURE — 500800 HCHG LAPAROSCOPIC J/L HOOK: Performed by: SURGERY

## 2018-02-13 PROCEDURE — 501583 HCHG TROCAR, THRD CAN&SEAL 5X100: Performed by: SURGERY

## 2018-02-13 PROCEDURE — 700111 HCHG RX REV CODE 636 W/ 250 OVERRIDE (IP): Performed by: SURGERY

## 2018-02-13 PROCEDURE — 160035 HCHG PACU - 1ST 60 MINS PHASE I: Performed by: SURGERY

## 2018-02-13 PROCEDURE — A9270 NON-COVERED ITEM OR SERVICE: HCPCS | Performed by: SURGERY

## 2018-02-13 PROCEDURE — 160031 HCHG SURGERY MINUTES - 1ST 30 MINS LEVEL 5: Performed by: SURGERY

## 2018-02-13 PROCEDURE — 700105 HCHG RX REV CODE 258: Performed by: SURGERY

## 2018-02-13 PROCEDURE — 502240 HCHG MISC OR SUPPLY RC 0272: Performed by: SURGERY

## 2018-02-13 PROCEDURE — 160002 HCHG RECOVERY MINUTES (STAT): Performed by: SURGERY

## 2018-02-13 PROCEDURE — 160009 HCHG ANES TIME/MIN: Performed by: SURGERY

## 2018-02-13 PROCEDURE — 502570 HCHG PACK, GASTRIC BANDING: Performed by: SURGERY

## 2018-02-13 PROCEDURE — 501399 HCHG SPECIMAN BAG, ENDO CATC: Performed by: SURGERY

## 2018-02-13 PROCEDURE — 770006 HCHG ROOM/CARE - MED/SURG/GYN SEMI*

## 2018-02-13 PROCEDURE — 501570 HCHG TROCAR, SEPARATOR: Performed by: SURGERY

## 2018-02-13 PROCEDURE — 700102 HCHG RX REV CODE 250 W/ 637 OVERRIDE(OP): Performed by: SURGERY

## 2018-02-13 PROCEDURE — 160042 HCHG SURGERY MINUTES - EA ADDL 1 MIN LEVEL 5: Performed by: SURGERY

## 2018-02-13 RX ORDER — LIDOCAINE AND PRILOCAINE 25; 25 MG/G; MG/G
1 CREAM TOPICAL
Status: ACTIVE | OUTPATIENT
Start: 2018-02-13 | End: 2018-02-14

## 2018-02-13 RX ORDER — DIPHENHYDRAMINE HYDROCHLORIDE 50 MG/ML
INJECTION INTRAMUSCULAR; INTRAVENOUS
Status: COMPLETED
Start: 2018-02-13 | End: 2018-02-13

## 2018-02-13 RX ORDER — HALOPERIDOL 5 MG/ML
INJECTION INTRAMUSCULAR
Status: COMPLETED
Start: 2018-02-13 | End: 2018-02-13

## 2018-02-13 RX ORDER — ONDANSETRON 2 MG/ML
4 INJECTION INTRAMUSCULAR; INTRAVENOUS EVERY 4 HOURS PRN
Status: DISCONTINUED | OUTPATIENT
Start: 2018-02-13 | End: 2018-02-15 | Stop reason: HOSPADM

## 2018-02-13 RX ORDER — LIDOCAINE HYDROCHLORIDE 10 MG/ML
0.5 INJECTION, SOLUTION INFILTRATION; PERINEURAL
Status: ACTIVE | OUTPATIENT
Start: 2018-02-13 | End: 2018-02-14

## 2018-02-13 RX ORDER — BUPIVACAINE HYDROCHLORIDE AND EPINEPHRINE 5; 5 MG/ML; UG/ML
INJECTION, SOLUTION EPIDURAL; INTRACAUDAL; PERINEURAL
Status: DISCONTINUED | OUTPATIENT
Start: 2018-02-13 | End: 2018-02-13 | Stop reason: HOSPADM

## 2018-02-13 RX ORDER — SODIUM CHLORIDE, SODIUM LACTATE, POTASSIUM CHLORIDE, CALCIUM CHLORIDE 600; 310; 30; 20 MG/100ML; MG/100ML; MG/100ML; MG/100ML
INJECTION, SOLUTION INTRAVENOUS CONTINUOUS
Status: DISCONTINUED | OUTPATIENT
Start: 2018-02-13 | End: 2018-02-15 | Stop reason: HOSPADM

## 2018-02-13 RX ORDER — HYDROMORPHONE HYDROCHLORIDE 2 MG/ML
INJECTION, SOLUTION INTRAMUSCULAR; INTRAVENOUS; SUBCUTANEOUS
Status: COMPLETED
Start: 2018-02-13 | End: 2018-02-13

## 2018-02-13 RX ORDER — HALOPERIDOL 5 MG/ML
1 INJECTION INTRAMUSCULAR EVERY 6 HOURS PRN
Status: DISCONTINUED | OUTPATIENT
Start: 2018-02-13 | End: 2018-02-15

## 2018-02-13 RX ORDER — CALCIUM CARBONATE 500 MG/1
500 TABLET, CHEWABLE ORAL
Status: DISCONTINUED | OUTPATIENT
Start: 2018-02-13 | End: 2018-02-15 | Stop reason: HOSPADM

## 2018-02-13 RX ORDER — ONDANSETRON 2 MG/ML
INJECTION INTRAMUSCULAR; INTRAVENOUS
Status: COMPLETED
Start: 2018-02-13 | End: 2018-02-13

## 2018-02-13 RX ORDER — SODIUM CHLORIDE, SODIUM LACTATE, POTASSIUM CHLORIDE, CALCIUM CHLORIDE 600; 310; 30; 20 MG/100ML; MG/100ML; MG/100ML; MG/100ML
INJECTION, SOLUTION INTRAVENOUS CONTINUOUS
Status: DISCONTINUED | OUTPATIENT
Start: 2018-02-13 | End: 2018-02-13

## 2018-02-13 RX ORDER — OXYCODONE HYDROCHLORIDE 5 MG/1
5 TABLET ORAL
Status: DISCONTINUED | OUTPATIENT
Start: 2018-02-14 | End: 2018-02-15

## 2018-02-13 RX ORDER — OXYCODONE HYDROCHLORIDE 10 MG/1
10 TABLET ORAL
Status: DISCONTINUED | OUTPATIENT
Start: 2018-02-14 | End: 2018-02-15

## 2018-02-13 RX ORDER — ACETAMINOPHEN 500 MG
1000 TABLET ORAL EVERY 6 HOURS
Status: DISCONTINUED | OUTPATIENT
Start: 2018-02-13 | End: 2018-02-15

## 2018-02-13 RX ORDER — HYDROMORPHONE HYDROCHLORIDE 2 MG/ML
0.5 INJECTION, SOLUTION INTRAMUSCULAR; INTRAVENOUS; SUBCUTANEOUS
Status: DISCONTINUED | OUTPATIENT
Start: 2018-02-14 | End: 2018-02-15

## 2018-02-13 RX ORDER — DIPHENHYDRAMINE HYDROCHLORIDE 50 MG/ML
25 INJECTION INTRAMUSCULAR; INTRAVENOUS EVERY 6 HOURS PRN
Status: DISCONTINUED | OUTPATIENT
Start: 2018-02-13 | End: 2018-02-15 | Stop reason: HOSPADM

## 2018-02-13 RX ORDER — LIDOCAINE HYDROCHLORIDE 10 MG/ML
INJECTION, SOLUTION INFILTRATION; PERINEURAL
Status: COMPLETED
Start: 2018-02-13 | End: 2018-02-13

## 2018-02-13 RX ADMIN — SODIUM CHLORIDE, POTASSIUM CHLORIDE, SODIUM LACTATE AND CALCIUM CHLORIDE: 600; 310; 30; 20 INJECTION, SOLUTION INTRAVENOUS at 22:25

## 2018-02-13 RX ADMIN — HALOPERIDOL 1 MG: 5 INJECTION INTRAMUSCULAR at 14:35

## 2018-02-13 RX ADMIN — FENTANYL CITRATE 50 MCG: 50 INJECTION, SOLUTION INTRAMUSCULAR; INTRAVENOUS at 13:31

## 2018-02-13 RX ADMIN — HYDROMORPHONE HYDROCHLORIDE 0.5 MCG: 2 INJECTION INTRAMUSCULAR; INTRAVENOUS; SUBCUTANEOUS at 13:47

## 2018-02-13 RX ADMIN — ANTACID TABLETS 500 MG: 500 TABLET, CHEWABLE ORAL at 22:26

## 2018-02-13 RX ADMIN — SODIUM CHLORIDE, POTASSIUM CHLORIDE, SODIUM LACTATE AND CALCIUM CHLORIDE: 600; 310; 30; 20 INJECTION, SOLUTION INTRAVENOUS at 16:03

## 2018-02-13 RX ADMIN — SODIUM CHLORIDE, SODIUM LACTATE, POTASSIUM CHLORIDE, CALCIUM CHLORIDE: 600; 310; 30; 20 INJECTION, SOLUTION INTRAVENOUS at 08:40

## 2018-02-13 RX ADMIN — DIPHENHYDRAMINE HYDROCHLORIDE 12.5 MG: 50 INJECTION INTRAMUSCULAR; INTRAVENOUS at 13:30

## 2018-02-13 RX ADMIN — HYDROMORPHONE HYDROCHLORIDE 0.5 MG: 2 INJECTION INTRAMUSCULAR; INTRAVENOUS; SUBCUTANEOUS at 14:04

## 2018-02-13 RX ADMIN — ENOXAPARIN SODIUM 40 MG: 100 INJECTION SUBCUTANEOUS at 17:40

## 2018-02-13 RX ADMIN — LIDOCAINE HYDROCHLORIDE: 10 INJECTION, SOLUTION INFILTRATION; PERINEURAL at 08:40

## 2018-02-13 RX ADMIN — ONDANSETRON 4 MG: 2 INJECTION INTRAMUSCULAR; INTRAVENOUS at 13:20

## 2018-02-13 RX ADMIN — FENTANYL CITRATE 50 MCG: 50 INJECTION, SOLUTION INTRAMUSCULAR; INTRAVENOUS at 13:20

## 2018-02-13 RX ADMIN — ONDANSETRON 4 MG: 2 INJECTION INTRAMUSCULAR; INTRAVENOUS at 22:09

## 2018-02-13 ASSESSMENT — COPD QUESTIONNAIRES
DO YOU EVER COUGH UP ANY MUCUS OR PHLEGM?: NO/ONLY WITH OCCASIONAL COLDS OR INFECTIONS
HAVE YOU SMOKED AT LEAST 100 CIGARETTES IN YOUR ENTIRE LIFE: NO/DON'T KNOW
COPD SCREENING SCORE: 0
DURING THE PAST 4 WEEKS HOW MUCH DID YOU FEEL SHORT OF BREATH: NONE/LITTLE OF THE TIME

## 2018-02-13 ASSESSMENT — PAIN SCALES - GENERAL
PAINLEVEL_OUTOF10: ASSUMED PAIN PRESENT
PAINLEVEL_OUTOF10: ASSUMED PAIN PRESENT
PAINLEVEL_OUTOF10: 7
PAINLEVEL_OUTOF10: 6
PAINLEVEL_OUTOF10: ASSUMED PAIN PRESENT
PAINLEVEL_OUTOF10: 7
PAINLEVEL_OUTOF10: 0
PAINLEVEL_OUTOF10: 6
PAINLEVEL_OUTOF10: ASSUMED PAIN PRESENT
PAINLEVEL_OUTOF10: 0
PAINLEVEL_OUTOF10: 6
PAINLEVEL_OUTOF10: 7
PAINLEVEL_OUTOF10: 5
PAINLEVEL_OUTOF10: 5

## 2018-02-13 ASSESSMENT — LIFESTYLE VARIABLES
EVER_SMOKED: NEVER
EVER_SMOKED: NEVER
ALCOHOL_USE: NO

## 2018-02-13 NOTE — OR NURSING
Pt's family updated to pt status. Pt is using PCA, tolerating water; sitting up in bed using iPhone.

## 2018-02-13 NOTE — PROGRESS NOTES
Pt arrived to unit.    Report received, poc discussed, assumed care of pt.   Call light in reach, hourly rounding in place.   Pt gets up SBA. Walked from gurney to bed.   NPO with ice.  Await void. LBM pta.   Dilaudid PCA in place for pain.  No further needs.

## 2018-02-13 NOTE — OP REPORT
DATE OF SERVICE:  02/13/2018    PREOPERATIVE DIAGNOSES:  Morbid obesity and chronic back pain.    POSTOPERATIVE DIAGNOSES:  Morbid obesity and chronic back pain.    PROCEDURE:  Laparoscopic vertical sleeve gastrectomy for morbid obesity.    SURGEON:  Quintin Nguyen MD    ASSISTANT:  MARVIN Irizarry    ANESTHESIA:  General endotracheal.    ANESTHESIOLOGIST:  Suresh Black MD    INDICATIONS:  This patient is a 23-year-old female with morbid obesity,   present BMI is 43.7.  She has obesity related comorbidities.  She was fully   evaluated and found to be a good candidate for bariatric surgery and to   fulfill the [employer] [place] [employer] consensus criteria for the surgical   treatment of morbid obesity.    FINDINGS AT OPERATION:  She had mildly enlarged left lobe of the liver.  There   was no evidence of crural diastasis or hiatal hernia.  She had the usual   stigmata of intra-abdominal obesity.    DESCRIPTION OF PROCEDURE:  With the patient in supine position under adequate   anesthesia, she was prepped and draped in the usual sterile fashion.  Local   anesthesia was infiltrated in the left mid abdomen.  Veress needle was   inserted.  Abdomen was insufflated to 15 mmHg with CO2 gas.  The initial   puncture wound was made in the supraumbilical midline utilizing a 5 mm direct   visualizing trocar cannula without difficulty.  Additional ports of 12 mm   placed in the right mid abdomen, 15 in the left mid abdomen, 5 mm left   subcostal port was placed, and a 5 mm puncture in the epigastrium for the   Curt retractor.  The retractor was inserted.  Left lobe of liver elevated   exposing the hiatus.  Patient was placed in steep reverse Trendelenburg   positioning.  Dissection then commenced.  A small epigastric fat pad was   resected with electrocautery.  The area of the angle of His was dissected with   electrocautery.  The left robbie anteriorly was exposed.  Attention then turned   to the antrum.  The  pylorus was identified.  Approximately 5 cm proximal to   the pylorus, an area was selected for dissection and from that point   proximally around the greater curvature, the gastrocolic and gastrosplenic   omentum were taken down utilizing the EnSeal bipolar ligation device.  This   included the short gastrics and any posterior fundal adhesions.  In this way,   the left robbie was fully exposed.  Formation of the sleeve was then begun   utilizing the green load linear stapler beginning approximately 5 cm proximal   to the pylorus.  After the initial firing, a 40-[place] bougie was brought down   by anesthesia and placed into the antrum.  Subsequent firing utilizing gold   load linear stapler was accomplished, staying well wide of the angularis   incisura.  Subsequent firings in cephalad direction of the blue load linear   stapler was taken up too, but just lateral to the angle of His.  With that   complete, the specimen was placed in a 15 mm EndoCatch sac and extracted.  The   staple line was then checked for hemostasis and secured with electrocautery   as necessary.  The staple line was then oversewn utilizing a 2-0 barbed   absorbable Stratafix suture in a continuous seromuscular running technique and   that fully covered the staple line from the angle of His down approximately   20 cm.  With that complete, the bougie was withdrawn, replaced with an   orogastric tube.  Then, utilizing blue dyed saline with cricoid pressure and a   clamp across the prepyloric area, the sleeve was tested for conformity, which   was excellent and there was no evidence of leak.  The area was irrigated and   aspirated.  The gastrocolic and gastrosplenic omentum were then approximated   to the left lateral aspect of the sleeve utilizing a running 2-0 Stratafix   suture.  The Curt retractor was removed.  The 15 mm port site was closed   with Endoclose suture of 0 Vicryl.  Skin was closed with subcuticular 4-0   Vicryl.  Patient  tolerated the procedure without complication.  There was   minimal blood loss.       ____________________________________     MD SAÚL AHUMADA / DARION    DD:  02/13/2018 13:45:48  DT:  02/13/2018 15:19:33    D#:  [id number]  Job#:  [id number]    cc: Kingston FELTON

## 2018-02-14 LAB
ANION GAP SERPL CALC-SCNC: 10 MMOL/L (ref 0–11.9)
BUN SERPL-MCNC: 8 MG/DL (ref 8–22)
CALCIUM SERPL-MCNC: 8.9 MG/DL (ref 8.5–10.5)
CHLORIDE SERPL-SCNC: 105 MMOL/L (ref 96–112)
CO2 SERPL-SCNC: 25 MMOL/L (ref 20–33)
CREAT SERPL-MCNC: 0.71 MG/DL (ref 0.5–1.4)
ERYTHROCYTE [DISTWIDTH] IN BLOOD BY AUTOMATED COUNT: 41.1 FL (ref 35.9–50)
GLUCOSE SERPL-MCNC: 88 MG/DL (ref 65–99)
HCT VFR BLD AUTO: 40.8 % (ref 37–47)
HGB BLD-MCNC: 13.3 G/DL (ref 12–16)
MCH RBC QN AUTO: 30.8 PG (ref 27–33)
MCHC RBC AUTO-ENTMCNC: 32.6 G/DL (ref 33.6–35)
MCV RBC AUTO: 94.4 FL (ref 81.4–97.8)
PLATELET # BLD AUTO: 302 K/UL (ref 164–446)
PMV BLD AUTO: 9.5 FL (ref 9–12.9)
POTASSIUM SERPL-SCNC: 4 MMOL/L (ref 3.6–5.5)
RBC # BLD AUTO: 4.32 M/UL (ref 4.2–5.4)
SODIUM SERPL-SCNC: 140 MMOL/L (ref 135–145)
WBC # BLD AUTO: 12.7 K/UL (ref 4.8–10.8)

## 2018-02-14 PROCEDURE — 700102 HCHG RX REV CODE 250 W/ 637 OVERRIDE(OP): Performed by: CLINICAL NURSE SPECIALIST

## 2018-02-14 PROCEDURE — 700102 HCHG RX REV CODE 250 W/ 637 OVERRIDE(OP): Performed by: SURGERY

## 2018-02-14 PROCEDURE — 85027 COMPLETE CBC AUTOMATED: CPT

## 2018-02-14 PROCEDURE — 700105 HCHG RX REV CODE 258: Performed by: SURGERY

## 2018-02-14 PROCEDURE — A9270 NON-COVERED ITEM OR SERVICE: HCPCS | Performed by: CLINICAL NURSE SPECIALIST

## 2018-02-14 PROCEDURE — 80048 BASIC METABOLIC PNL TOTAL CA: CPT

## 2018-02-14 PROCEDURE — 770006 HCHG ROOM/CARE - MED/SURG/GYN SEMI*

## 2018-02-14 PROCEDURE — 36415 COLL VENOUS BLD VENIPUNCTURE: CPT

## 2018-02-14 PROCEDURE — 700111 HCHG RX REV CODE 636 W/ 250 OVERRIDE (IP): Performed by: SURGERY

## 2018-02-14 PROCEDURE — A9270 NON-COVERED ITEM OR SERVICE: HCPCS | Performed by: SURGERY

## 2018-02-14 RX ORDER — SIMETHICONE 80 MG
80 TABLET,CHEWABLE ORAL 3 TIMES DAILY PRN
Status: DISCONTINUED | OUTPATIENT
Start: 2018-02-14 | End: 2018-02-15 | Stop reason: HOSPADM

## 2018-02-14 RX ORDER — PROMETHAZINE HYDROCHLORIDE 25 MG/1
25 SUPPOSITORY RECTAL EVERY 6 HOURS PRN
Status: DISCONTINUED | OUTPATIENT
Start: 2018-02-14 | End: 2018-02-15 | Stop reason: HOSPADM

## 2018-02-14 RX ADMIN — ONDANSETRON 4 MG: 2 INJECTION INTRAMUSCULAR; INTRAVENOUS at 11:16

## 2018-02-14 RX ADMIN — ACETAMINOPHEN 1000 MG: 500 TABLET ORAL at 17:52

## 2018-02-14 RX ADMIN — SODIUM CHLORIDE, POTASSIUM CHLORIDE, SODIUM LACTATE AND CALCIUM CHLORIDE: 600; 310; 30; 20 INJECTION, SOLUTION INTRAVENOUS at 21:22

## 2018-02-14 RX ADMIN — DIPHENHYDRAMINE HYDROCHLORIDE 25 MG: 50 INJECTION, SOLUTION INTRAMUSCULAR; INTRAVENOUS at 21:22

## 2018-02-14 RX ADMIN — DIPHENHYDRAMINE HYDROCHLORIDE 25 MG: 50 INJECTION, SOLUTION INTRAMUSCULAR; INTRAVENOUS at 00:02

## 2018-02-14 RX ADMIN — OXYCODONE HYDROCHLORIDE 10 MG: 10 TABLET ORAL at 21:22

## 2018-02-14 RX ADMIN — OXYCODONE HYDROCHLORIDE 10 MG: 10 TABLET ORAL at 14:23

## 2018-02-14 RX ADMIN — ANTACID TABLETS 500 MG: 500 TABLET, CHEWABLE ORAL at 06:22

## 2018-02-14 RX ADMIN — SODIUM CHLORIDE, POTASSIUM CHLORIDE, SODIUM LACTATE AND CALCIUM CHLORIDE: 600; 310; 30; 20 INJECTION, SOLUTION INTRAVENOUS at 15:20

## 2018-02-14 RX ADMIN — ACETAMINOPHEN 1000 MG: 500 TABLET ORAL at 11:12

## 2018-02-14 RX ADMIN — OXYCODONE HYDROCHLORIDE 10 MG: 10 TABLET ORAL at 06:18

## 2018-02-14 RX ADMIN — ACETAMINOPHEN 1000 MG: 500 TABLET ORAL at 06:00

## 2018-02-14 RX ADMIN — SIMETHICONE CHEW TAB 80 MG 80 MG: 80 TABLET ORAL at 15:30

## 2018-02-14 RX ADMIN — ENOXAPARIN SODIUM 40 MG: 100 INJECTION SUBCUTANEOUS at 21:22

## 2018-02-14 RX ADMIN — ONDANSETRON 4 MG: 2 INJECTION INTRAMUSCULAR; INTRAVENOUS at 06:16

## 2018-02-14 RX ADMIN — ENOXAPARIN SODIUM 40 MG: 100 INJECTION SUBCUTANEOUS at 07:30

## 2018-02-14 RX ADMIN — ONDANSETRON 4 MG: 2 INJECTION INTRAMUSCULAR; INTRAVENOUS at 17:55

## 2018-02-14 RX ADMIN — DIPHENHYDRAMINE HYDROCHLORIDE 25 MG: 50 INJECTION, SOLUTION INTRAMUSCULAR; INTRAVENOUS at 14:21

## 2018-02-14 RX ADMIN — OXYCODONE HYDROCHLORIDE 10 MG: 10 TABLET ORAL at 11:12

## 2018-02-14 RX ADMIN — OXYCODONE HYDROCHLORIDE 10 MG: 10 TABLET ORAL at 17:52

## 2018-02-14 RX ADMIN — DIPHENHYDRAMINE HYDROCHLORIDE 25 MG: 50 INJECTION, SOLUTION INTRAMUSCULAR; INTRAVENOUS at 07:30

## 2018-02-14 RX ADMIN — SODIUM CHLORIDE, POTASSIUM CHLORIDE, SODIUM LACTATE AND CALCIUM CHLORIDE: 600; 310; 30; 20 INJECTION, SOLUTION INTRAVENOUS at 06:17

## 2018-02-14 ASSESSMENT — ENCOUNTER SYMPTOMS
CHILLS: 0
NAUSEA: 1
VOMITING: 1
COUGH: 0
FEVER: 0
CARDIOVASCULAR NEGATIVE: 1
DIZZINESS: 0
HEARTBURN: 0

## 2018-02-14 ASSESSMENT — PAIN SCALES - GENERAL
PAINLEVEL_OUTOF10: 4
PAINLEVEL_OUTOF10: 4
PAINLEVEL_OUTOF10: 8
PAINLEVEL_OUTOF10: 8
PAINLEVEL_OUTOF10: 7
PAINLEVEL_OUTOF10: 4

## 2018-02-14 NOTE — DIETARY
NUTRITION SERVICES: BMI - Pt with BMI >40 (=43.5) - morbid obesity, class III. Weight loss counseling not appropriate in acute care setting.     RECOMMEND - Referral to outpatient nutrition services for weight management after D/C.

## 2018-02-14 NOTE — PROGRESS NOTES
Surgical Progress Note    Author: Regina James Date & Time created: 2018   2:04 PM     Interval Events:    Post Op Day 1  Review of Systems   Constitutional: Negative for chills and fever.   Respiratory: Negative for cough.    Cardiovascular: Negative.    Gastrointestinal: Positive for nausea and vomiting. Negative for heartburn.   Skin: Negative.    Neurological: Negative for dizziness.     Hemodynamics:  Temp (24hrs), Av.6 °C (97.8 °F), Min:36.3 °C (97.3 °F), Max:37.1 °C (98.7 °F)  Temperature: 36.5 °C (97.7 °F)  Pulse  Av.3  Min: 68  Max: 100Heart Rate (Monitored): 72  Blood Pressure: (!) 99/59, NIBP: 115/54     Respiratory:    Respiration: 16, Pulse Oximetry: 92 %, O2 Daily Delivery Respiratory : Room Air with O2 Available     Work Of Breathing / Effort: Mild  RUL Breath Sounds: Clear, RML Breath Sounds: Diminished, RLL Breath Sounds: Diminished, MARVA Breath Sounds: Clear, LLL Breath Sounds: Diminished  Neuro:  GCS       Fluids:    Intake/Output Summary (Last 24 hours) at 18 1404  Last data filed at 18 1200   Gross per 24 hour   Intake             2518 ml   Output             1625 ml   Net              893 ml        Current Diet Order   Procedures   • DIET ORDER     Physical Exam   Constitutional: She appears well-developed and well-nourished.   HENT:   Head: Normocephalic.   Eyes: Pupils are equal, round, and reactive to light.   Neck: Normal range of motion.   Cardiovascular: Normal rate.    Pulmonary/Chest: Effort normal.   Abdominal: Soft.   Musculoskeletal: Normal range of motion.   Skin: Skin is warm and dry.   Psychiatric: She has a normal mood and affect.     Labs:  Recent Results (from the past 24 hour(s))   CBC without Differential (blood)    Collection Time: 18  2:49 AM   Result Value Ref Range    WBC 12.7 (H) 4.8 - 10.8 K/uL    RBC 4.32 4.20 - 5.40 M/uL    Hemoglobin 13.3 12.0 - 16.0 g/dL    Hematocrit 40.8 37.0 - 47.0 %    MCV 94.4 81.4 - 97.8 fL    MCH 30.8 27.0  - 33.0 pg    MCHC 32.6 (L) 33.6 - 35.0 g/dL    RDW 41.1 35.9 - 50.0 fL    Platelet Count 302 164 - 446 K/uL    MPV 9.5 9.0 - 12.9 fL   Basic Metabolic Panel (BMP)    Collection Time: 02/14/18  2:49 AM   Result Value Ref Range    Sodium 140 135 - 145 mmol/L    Potassium 4.0 3.6 - 5.5 mmol/L    Chloride 105 96 - 112 mmol/L    Co2 25 20 - 33 mmol/L    Glucose 88 65 - 99 mg/dL    Bun 8 8 - 22 mg/dL    Creatinine 0.71 0.50 - 1.40 mg/dL    Calcium 8.9 8.5 - 10.5 mg/dL    Anion Gap 10.0 0.0 - 11.9   ESTIMATED GFR    Collection Time: 02/14/18  2:49 AM   Result Value Ref Range    GFR If African American >60 >60 mL/min/1.73 m 2    GFR If Non African American >60 >60 mL/min/1.73 m 2     Medical Decision Making, by Problem:  There are no active hospital problems to display for this patient.    Plan:  Will continue to encourage out of bed to ambulate, use of incentive spirometer at bedside.  Diet continue bariatric clear liquids for now, advance to bariatric full liquids if nausea subsides.  Phenergan suppository if zofran is ineffective.  Keep overnight and reassess in the morning.    Quality Measures:  Quality-Core Measures   Reviewed items::  Labs reviewed and Medications reviewed  Velazquez catheter::  No Velazquez  DVT prophylaxis pharmacological::  Enoxaparin (Lovenox)  DVT prophylaxis - mechanical:  SCDs  Ulcer Prophylaxis::  Not indicated      Discussed patient condition with RN and Patient

## 2018-02-14 NOTE — CARE PLAN
Problem: Pain Management  Goal: Pain level will decrease to patient’s comfort goal  Outcome: PROGRESSING AS EXPECTED  Pt has pain in abdomen, receiving PCA boluses per MAR, repositioned for comfort. Non-pharmacologic options offered.    Problem: Mobility  Goal: Risk for activity intolerance will decrease  Outcome: PROGRESSING AS EXPECTED  Pt is a SBA. Pt mobilized from gurney to bed and up to BR.

## 2018-02-14 NOTE — CARE PLAN
Problem: Safety  Goal: Will remain free from injury  Outcome: PROGRESSING AS EXPECTED  Proper fall precautions in place. Call light within reach and encouraged to use. Hourly rounding in practice. Bed alarm refused.     Problem: Pain Management  Goal: Pain level will decrease to patient's comfort goal  Outcome: PROGRESSING AS EXPECTED  Pt tolerating PCA for pain management. Pt reporting mild nausea, medicated per MAR.

## 2018-02-14 NOTE — PROGRESS NOTES
"Assumed care of patient. Patient has questions regarding diet and oral medications. Patient educated on need to only take in small amounts of liquids at this time and to follow pre op diet instructions. Patient stating she is not \"allowed to take pills, because I can not digest them\". Educated patient that she can take pills, but they need to be crushed (if allowed) if they are bigger than an eraser head. Patient understands. Lap stabs x5 to abdomen with gauze and tegaderm. Dressings are clean, dry and intact. No other needs at this time. Call light within reach.   "

## 2018-02-15 VITALS
HEIGHT: 64 IN | BODY MASS INDEX: 43.32 KG/M2 | DIASTOLIC BLOOD PRESSURE: 75 MMHG | HEART RATE: 70 BPM | OXYGEN SATURATION: 94 % | TEMPERATURE: 98.7 F | RESPIRATION RATE: 16 BRPM | WEIGHT: 253.75 LBS | SYSTOLIC BLOOD PRESSURE: 116 MMHG

## 2018-02-15 PROCEDURE — A9270 NON-COVERED ITEM OR SERVICE: HCPCS | Performed by: CLINICAL NURSE SPECIALIST

## 2018-02-15 PROCEDURE — 700111 HCHG RX REV CODE 636 W/ 250 OVERRIDE (IP): Performed by: SURGERY

## 2018-02-15 PROCEDURE — A9270 NON-COVERED ITEM OR SERVICE: HCPCS | Performed by: SURGERY

## 2018-02-15 PROCEDURE — 700105 HCHG RX REV CODE 258: Performed by: SURGERY

## 2018-02-15 PROCEDURE — 700102 HCHG RX REV CODE 250 W/ 637 OVERRIDE(OP): Performed by: CLINICAL NURSE SPECIALIST

## 2018-02-15 PROCEDURE — 700102 HCHG RX REV CODE 250 W/ 637 OVERRIDE(OP): Performed by: SURGERY

## 2018-02-15 RX ORDER — HYDROCODONE BITARTRATE AND ACETAMINOPHEN 5; 325 MG/1; MG/1
1-2 TABLET ORAL EVERY 6 HOURS PRN
Qty: 30 TAB | Refills: 0 | Status: SHIPPED | OUTPATIENT
Start: 2018-02-15 | End: 2018-03-01

## 2018-02-15 RX ORDER — PROMETHAZINE HYDROCHLORIDE 25 MG/1
25 SUPPOSITORY RECTAL EVERY 6 HOURS PRN
Qty: 10 SUPPOSITORY | Refills: 0 | Status: SHIPPED | OUTPATIENT
Start: 2018-02-15 | End: 2018-03-21

## 2018-02-15 RX ORDER — HYDROCODONE BITARTRATE AND ACETAMINOPHEN 5; 325 MG/1; MG/1
1-2 TABLET ORAL EVERY 6 HOURS PRN
Status: DISCONTINUED | OUTPATIENT
Start: 2018-02-15 | End: 2018-02-15 | Stop reason: HOSPADM

## 2018-02-15 RX ADMIN — DIPHENHYDRAMINE HYDROCHLORIDE 25 MG: 50 INJECTION, SOLUTION INTRAMUSCULAR; INTRAVENOUS at 07:27

## 2018-02-15 RX ADMIN — OXYCODONE HYDROCHLORIDE 10 MG: 10 TABLET ORAL at 03:54

## 2018-02-15 RX ADMIN — HYDROCODONE BITARTRATE AND ACETAMINOPHEN 1 TABLET: 5; 325 TABLET ORAL at 10:29

## 2018-02-15 RX ADMIN — OXYCODONE HYDROCHLORIDE 10 MG: 10 TABLET ORAL at 07:27

## 2018-02-15 RX ADMIN — ONDANSETRON 4 MG: 2 INJECTION INTRAMUSCULAR; INTRAVENOUS at 03:54

## 2018-02-15 RX ADMIN — ACETAMINOPHEN 1000 MG: 500 TABLET ORAL at 07:27

## 2018-02-15 RX ADMIN — SODIUM CHLORIDE, POTASSIUM CHLORIDE, SODIUM LACTATE AND CALCIUM CHLORIDE: 600; 310; 30; 20 INJECTION, SOLUTION INTRAVENOUS at 07:27

## 2018-02-15 RX ADMIN — ENOXAPARIN SODIUM 40 MG: 100 INJECTION SUBCUTANEOUS at 10:29

## 2018-02-15 ASSESSMENT — PAIN SCALES - GENERAL
PAINLEVEL_OUTOF10: 7
PAINLEVEL_OUTOF10: 6
PAINLEVEL_OUTOF10: 5

## 2018-02-15 ASSESSMENT — LIFESTYLE VARIABLES: EVER_SMOKED: NEVER

## 2018-02-15 ASSESSMENT — ENCOUNTER SYMPTOMS
HEARTBURN: 0
CARDIOVASCULAR NEGATIVE: 1
NAUSEA: 1
RESPIRATORY NEGATIVE: 1
NEUROLOGICAL NEGATIVE: 1
VOMITING: 1
FEVER: 0

## 2018-02-15 NOTE — PROGRESS NOTES
Bedside report received.  Assessment complete.  A&O x 4. Patient calls appropriately.  Patient ambualtes with standby assist.   Reports pain 6/10. Medicated per Mar.  Denies N&V at this time. Reports feeling nauseous overnight. Tolerating gastric clear diet.  Surgical lap sites x5 on abdomen.  + void, + flatus  Patient denies SOB.  Review plan with of care with patient. Call light and personal belongings with in reach. Hourly rounding in place. All needs met at this time.

## 2018-02-15 NOTE — PROGRESS NOTES
Surgical Progress Note    Author: Regina James Date & Time created: 2/15/2018   9:07 AM     Interval Events:    Post op day #2  C/O nausea and hiccups.    Review of Systems   Constitutional: Negative for fever.   Respiratory: Negative.    Cardiovascular: Negative.    Gastrointestinal: Positive for nausea and vomiting. Negative for heartburn.   Genitourinary: Negative.    Skin: Negative.    Neurological: Negative.      Hemodynamics:  Temp (24hrs), Av.7 °C (98.1 °F), Min:36.4 °C (97.6 °F), Max:37 °C (98.6 °F)  Temperature: 37 °C (98.6 °F)  Pulse  Av.7  Min: 66  Max: 100   Blood Pressure: 112/72     Respiratory:    Respiration: 16, Pulse Oximetry: 94 %     Work Of Breathing / Effort: Mild  RUL Breath Sounds: Clear, RML Breath Sounds: Diminished, RLL Breath Sounds: Diminished, MARVA Breath Sounds: Clear, LLL Breath Sounds: Diminished  Neuro:  GCS       Fluids:    Intake/Output Summary (Last 24 hours) at 02/15/18 0907  Last data filed at 02/15/18 0400   Gross per 24 hour   Intake             2740 ml   Output                0 ml   Net             2740 ml        Current Diet Order   Procedures   • DIET ORDER     Physical Exam  Labs:  No results found for this or any previous visit (from the past 24 hour(s)).  Medical Decision Making, by Problem:  There are no active hospital problems to display for this patient.    Plan:  Encourage oral intake. DIscuss use of Phenergan for nausea.  Bariatric full liquids.  Encourage out of bed q 2 h.  Monitor for nausea this a.m.   If doing well will plan to send home today.      Quality Measures:  Quality-Core Measures   Reviewed items::  Medications reviewed  Velazquez catheter::  No Velazquez  DVT prophylaxis pharmacological::  Enoxaparin (Lovenox)  DVT prophylaxis - mechanical:  SCDs  Ulcer Prophylaxis::  Not indicated      Discussed patient condition with RN and Patient

## 2018-02-15 NOTE — PROGRESS NOTES
Patient discharged home with family member. Taken to [facility] by [facility] volunteer in a wheel chair. IV removed. Patient had all belongings. Patient had prescriptions. Consent for opiates signed. Patient verbalized understanding of discharge instructions.     Charge nurse notified.

## 2018-02-15 NOTE — PROGRESS NOTES
Assumed care of pt at 1900, report given.  A+O x 4, VSS, and RA.   Pt has 5 lap sites covered with dry gauze and tegaderm.    Pt reports mild nausea; medicated per MAR with Zofran and Benadryl; tolerating well.   Pt medicated with PO Oxycodone 10mg q 3 hrs and scheduled Tylenol; tolerating well.   Pt ambulates self up to restroom with a standby assist.   Pt updated on POC and potential discharge in AM. All questions answered at this time.  Bed in lowest position, call light within reach, and no current needs.

## 2018-02-15 NOTE — DISCHARGE SUMMARY
DATE OF ADMISSION: 2/13/2018    DATE OF SERVICE:  2/13/2018    DATE OF DISCHARGE: 2/15/2018     ADMISSION  DIAGNOSIS: Morbid obesity and chronic back pain.    DISCHARGE  DIAGNOSIS:  Morbid obesity and chronic back pain.     PROCEDURE: Laparoscopic Vertical Sleeve Gastrectomy      INDICATIONS:  Patient is a 23 y.o.female with morbid obesity,   present BMI is 43.7.  She has obesity related comorbidities.  She was fully   evaluated and found to be a good candidate for bariatric surgery and to   fulfill the [employer] consensus criteria for the surgical   treatment of morbid obesity.      FINDINGS:   She had mildly enlarged left lobe of the liver.  There   was no evidence of crural diastasis or hiatal hernia.  She had the usual   stigmata of intra-abdominal obesity.    HOSPITAL COURSE:  Patient did generally well, but did have issues with nausea which may be related to oxycodone.  Tried hydrocodone and patient improved.   Tolerating bariatric liquids, and is voiding qs.    MEDICATIONS:      Medication List      START taking these medications      Instructions   HYDROcodone-acetaminophen 5-325 MG Tabs per tablet  Commonly known as:  NORCO   Take 1-2 Tabs by mouth every 6 hours as needed for up to 14 days.  Dose:  1-2 Tab     promethazine 25 MG Supp  Commonly known as:  PHENERGAN   Insert 1 Suppository in rectum every 6 hours as needed for Nausea/Vomiting (If zofran is ineffective).  Dose:  25 mg        STOP taking these medications    NON SPECIFIED          May resume home medications, but hold dietary supplements for now.  Opoid use was reviewed and potential issues discussed.    FOLLOW UP: 1 week in office.  Call for concerns.    VIKKI Jeff.

## 2018-02-15 NOTE — DISCHARGE INSTRUCTIONS
Discharge Instructions    Discharged to home by car with relative. Discharged via wheelchair, hospital escort: Yes.  Special equipment needed: Not Applicable    Be sure to schedule a follow-up appointment with your primary care doctor or any specialists as instructed.     Discharge Plan:   Diet Plan: Discussed  Activity Level: Discussed  Confirmed Follow up Appointment: Patient to Call and Schedule Appointment  Confirmed Symptoms Management: Discussed  Medication Reconciliation Updated: Yes  Influenza Vaccine Indication: Not indicated: Previously immunized this influenza season and > 8 years of age    I understand that a diet low in cholesterol, fat, and sodium is recommended for good health. Unless I have been given specific instructions below for another diet, I accept this instruction as my diet prescription.   Other diet: follow diet plan given by doctor    Special Instructions:     Home instructions:   Diet is strict bariatric full liquids x 3 weeks.  Progression of diet will be discussed at post op appointment and will follow the guidelines in the patient handbook.   No heavy lifting (no more than 10 pounds)   May shower in a.m. No tub baths, swimming, or hot tubs until incisions are healed.   No driving while taking narcotics.  Opioid risks were discussed at length with patient at pre op visit and in hospital.   Send home with incentive spirometer, and have patient use every 1-2 hours while awake x 2 weeks.   May resume home medications.   Use zofran (patient has prescription) and also will send home with Phenergan suppository if zofran does not alleviate symptoms.   Patient to take PPI daily.   Start multivitamin and mineral supplements one week post op.  These will be discussed at post op appointment.   Follow up in office in one week.   Call for concerns.    · Is patient discharged on Warfarin / Coumadin?   No     Depression / Suicide Risk    As you are discharged from this [facility] facility, it is  important to learn how to keep safe from harming yourself.    Recognize the warning signs:  · Abrupt changes in personality, positive or negative- including increase in energy   · Giving away possessions  · Change in eating patterns- significant weight changes-  positive or negative  · Change in sleeping patterns- unable to sleep or sleeping all the time   · Unwillingness or inability to communicate  · Depression  · Unusual sadness, discouragement and loneliness  · Talk of wanting to die  · Neglect of personal appearance   · Rebelliousness- reckless behavior  · Withdrawal from people/activities they love  · Confusion- inability to concentrate     If you or a loved one observes any of these behaviors or has concerns about self-harm, here's what you can do:  · Talk about it- your feelings and reasons for harming yourself  · Remove any means that you might use to hurt yourself (examples: pills, rope, extension cords, firearm)  · Get professional help from the community (Mental Health, Substance Abuse, psychological counseling)  · Do not be alone:Call your Safe Contact- someone whom you trust who will be there for you.  · Call your local CRISIS HOTLINE 074-8807 or 607-564-9312  · Call your local Children's Mobile Crisis Response Team [place] (086) 589-7102 or www.Yesweplay  · Call the toll free National Suicide Prevention Hotlines   · National Suicide Prevention Lifeline 277-016-ISPY (1729)  · National Hope Line Network 800-SUICIDE (262-6281)    Sleeve Gastrectomy, Care After  Refer to this sheet in the next few weeks. These instructions provide you with information on caring for yourself after your procedure. Your surgeon may also give you more specific instructions. Your treatment has been planned according to current medical practices, but problems sometimes occur. Call your surgeon if you have any problems or questions after your procedure.  HOME CARE INSTRUCTIONS  · Get plenty of rest, but move around  frequently for short periods or take short walks as directed by your surgeon. Increase your activities gradually.  · Only take over-the-counter or prescription medicines as directed by your surgeon.  · Keep incision areas clean and dry. Remove or change any bandages (dressings) only as directed by your surgeon. You may have skin adhesive strips or glue over the incision areas. Do not take the strips or the glue off. They will fall off on their own.  · Check your incisions and surrounding areas daily for any redness, swelling, or drainage of fluid.  · Take showers once your surgeon approves. Until then, only take sponge baths. Pat incisions dry. Do not rub incisions with a washcloth or towel. Do not take tub baths or go swimming until your surgeon approves. Do not put anything on your incision to clean it unless directed to do so by your surgeon.  · Limit activities as directed by your surgeon. You will need to avoid strenuous activity, heavy lifting, and pushing or pulling things with your arms for several weeks. Do not lift anything heavier than 10 lb (4.5 kg).  · Perform deep breathing exercises and coughing as directed by your surgeon. This helps prevent a lung infection.  · Do not drive until your surgeon approves.  · Follow all of the dietary instructions provided by your surgeon or dietitian. You will receive specific instructions on the type, size, and timing of meals.  ¨   ¨   ¨ You may need to stay on a liquid diet for some time after the surgery.  ¨ Drink fluids frequently. You should drink 1 oz of fluid as often as you can.  ¨ Take vitamin, calcium, and protein supplements as directed by your surgeon.  · If you have a drain from the incision area, make sure you:  ¨   ¨   ¨ Keep the area of the drain clean and dry.  ¨ Empty the drain and record the amount of fluid daily.  · Talk with your surgeon about when you may return to work and about your exercise routine.    · Keep all follow-up appointments with  your surgeon and dietitian.  SEEK MEDICAL CARE IF:  · Your pain is not controlled with medicine.  · You have a fever.  · You have shaking chills.  · You notice any redness, skin irritation, swelling, or drainage of fluid (other than light red) in the incision area.  · Your drain gets pulled out accidentally.    · Your drain contains bright red blood, green fluid, or fluid that has a foul smell.  SEEK IMMEDIATE MEDICAL CARE IF:  · You have difficulty breathing.  · You have severe calf pain.  MAKE SURE YOU:  · Understand these instructions.  · Will watch your condition.  · Will get help right away if you are not doing well or get worse.     This information is not intended to replace advice given to you by your health care provider. Make sure you discuss any questions you have with your health care provider.     Document Released: 10/14/2010 Document Revised: 08/20/2014 Document Reviewed: 05/02/2014  [employer] Interactive Patient Education ©2016 [employer]

## 2018-03-21 ENCOUNTER — OFFICE VISIT (OUTPATIENT)
Dept: MEDICAL GROUP | Facility: PHYSICIAN GROUP | Age: 23
End: 2018-03-21
Payer: COMMERCIAL

## 2018-03-21 VITALS
HEART RATE: 87 BPM | BODY MASS INDEX: 39.44 KG/M2 | DIASTOLIC BLOOD PRESSURE: 62 MMHG | OXYGEN SATURATION: 96 % | TEMPERATURE: 97.8 F | SYSTOLIC BLOOD PRESSURE: 118 MMHG | HEIGHT: 64 IN | WEIGHT: 231 LBS

## 2018-03-21 DIAGNOSIS — J06.9 VIRAL URI WITH COUGH: ICD-10-CM

## 2018-03-21 LAB
INT CON NEG: NEGATIVE
INT CON POS: POSITIVE
S PYO AG THROAT QL: NEGATIVE

## 2018-03-21 PROCEDURE — 87880 STREP A ASSAY W/OPTIC: CPT | Performed by: NURSE PRACTITIONER

## 2018-03-21 PROCEDURE — 99213 OFFICE O/P EST LOW 20 MIN: CPT | Performed by: NURSE PRACTITIONER

## 2018-03-21 RX ORDER — PROMETHAZINE HYDROCHLORIDE AND CODEINE PHOSPHATE 6.25; 1 MG/5ML; MG/5ML
5 SYRUP ORAL 4 TIMES DAILY PRN
Qty: 120 ML | Refills: 0 | Status: SHIPPED
Start: 2018-03-21 | End: 2018-03-31

## 2018-03-21 RX ORDER — OMEPRAZOLE 20 MG/1
20 CAPSULE, DELAYED RELEASE ORAL DAILY
COMMUNITY
End: 2019-05-15

## 2018-03-21 RX ORDER — AZITHROMYCIN 250 MG/1
TABLET, FILM COATED ORAL
Qty: 6 TAB | Refills: 0 | Status: SHIPPED | OUTPATIENT
Start: 2018-03-21 | End: 2018-03-26

## 2018-03-21 NOTE — PROGRESS NOTES
"  Subjective:     Chief Complaint   Patient presents with   • Cough     x1week   • Pharyngitis     with fevers x1 week       HPI  Mary Carmen Sanchez is a 23 y.o. female here today for URI. Symptoms started one week ago. She cannot sleep at night because she is coughing so much. Cough with occasional sputum. No sinus pressure or congestion. +rhinorrhea. Throat is very sore. She cannot even drink fluids without worsening throat pain. +headache. No dizziness. +milld sob, but this is improving. No wheezing. Fever has been 102 for the past 4 days. Cannot take NSAIDs as she just had gastric sleeve surgery, but has been using Tylenol. She is not improving over the past 2 days.      The encounter diagnosis was Viral URI with cough.    Allergies: Patient has no known allergies.  Current medicines (including changes today)  Current Outpatient Prescriptions   Medication Sig Dispense Refill   • omeprazole (PRILOSEC) 20 MG delayed-release capsule Take 20 mg by mouth every day.     • promethazine-codeine (PHENERGAN-CODEINE) 6.25-10 MG/5ML Syrup Take 5 mL by mouth 4 times a day as needed for up to 10 days. 120 mL 0   • azithromycin (ZITHROMAX) 250 MG Tab 2 tabs by mouth day 1, 1 tab by mouth days 2-5 6 Tab 0     No current facility-administered medications for this visit.        She  has a past medical history of Anxiety; Asthma; and Depression.        ROS  As stated in HPI      Objective:     Blood pressure 118/62, pulse 87, temperature 36.6 °C (97.8 °F), height 1.626 m (5' 4\"), weight 104.8 kg (231 lb), SpO2 96 %. Body mass index is 39.65 kg/m².  Physical Exam:  General: Alert, oriented, in no acute distress.  Eye contact is good, speech goal directed, affect calm  CNs grossly intact.  HEENT: conjunctiva non-injected, sclera non-icteric, EOMs intact. No lid edema or eye drainage.   Pinna normal without skin lesions. TM pearly mercado. Gross hearing intact.  Nasal turbinates without edema. Clear drainage present.  Oral mucous " membranes pink and moist with no lesions. Oropharynx with erythema. No exudate.   Neck: Supple. No adenopathy or masses in the cervical or supraclavicular regions  Lungs: unlabored. clear to auscultation bilaterally with good excursion.  CV: regular rate and rhythm. No murmurs.   Skin: No rashes or lesions in visible areas  Gait steady.     Assessment and Plan:   Assessment/Plan:  1. Viral URI with cough  Strep negative. Enc her to use conservative management with rest, fluids, tylenol for 3 days. If not improving, may start z-pack  - POCT Rapid Strep A  - promethazine-codeine (PHENERGAN-CODEINE) 6.25-10 MG/5ML Syrup; Take 5 mL by mouth 4 times a day as needed for up to 10 days.  Dispense: 120 mL; Refill: 0  - azithromycin (ZITHROMAX) 250 MG Tab; 2 tabs by mouth day 1, 1 tab by mouth days 2-5  Dispense: 6 Tab; Refill: 0       Follow up:  Return if symptoms worsen or fail to improve.    Educated in proper administration of medication(s) ordered today including safety, possible SE, risks, benefits, rationale and alternatives to therapy.   Supportive care, differential diagnoses, and indications for immediate follow-up discussed with patient.    Pathogenesis of diagnosis discussed including typical length and natural progression.    Instructed to return to clinic or nearest emergency department for any change in condition, further concerns, or worsening of symptoms.  Patient states understanding of the plan of care and discharge instructions.      Please note that this dictation was created using voice recognition software. I have made every reasonable attempt to correct obvious errors, but I expect that there are errors of grammar and possibly content that I did not discover before finalizing the note.    Followup: Return if symptoms worsen or fail to improve. sooner should new symptoms or problems arise.

## 2018-03-30 ENCOUNTER — EMPLOYEE HEALTH (OUTPATIENT)
Dept: OCCUPATIONAL MEDICINE | Facility: CLINIC | Age: 23
End: 2018-03-30

## 2018-03-30 ENCOUNTER — EH NON-PROVIDER (OUTPATIENT)
Dept: OCCUPATIONAL MEDICINE | Facility: CLINIC | Age: 23
End: 2018-03-30

## 2018-03-30 ENCOUNTER — HOSPITAL ENCOUNTER (OUTPATIENT)
Facility: MEDICAL CENTER | Age: 23
End: 2018-03-30
Attending: PREVENTIVE MEDICINE
Payer: COMMERCIAL

## 2018-03-30 VITALS
DIASTOLIC BLOOD PRESSURE: 64 MMHG | TEMPERATURE: 97.1 F | HEART RATE: 90 BPM | OXYGEN SATURATION: 97 % | SYSTOLIC BLOOD PRESSURE: 110 MMHG | HEIGHT: 64 IN | BODY MASS INDEX: 38.24 KG/M2 | WEIGHT: 224 LBS

## 2018-03-30 DIAGNOSIS — Z02.1 PRE-EMPLOYMENT DRUG SCREENING: ICD-10-CM

## 2018-03-30 DIAGNOSIS — Z02.1 PRE-EMPLOYMENT HEALTH SCREENING EXAMINATION: ICD-10-CM

## 2018-03-30 LAB
AMP AMPHETAMINE: NORMAL
BAR BARBITURATES: NORMAL
BZO BENZODIAZEPINES: NORMAL
COC COCAINE: NORMAL
INT CON NEG: NORMAL
INT CON POS: NORMAL
MDMA ECSTASY: NORMAL
MET METHAMPHETAMINES: NORMAL
MTD METHADONE: NORMAL
OPI OPIATES: NORMAL
OXY OXYCODONE: NORMAL
PCP PHENCYCLIDINE: NORMAL
POC URINE DRUG SCREEN OCDRS: NORMAL
THC: NORMAL

## 2018-03-30 PROCEDURE — 80305 DRUG TEST PRSMV DIR OPT OBS: CPT | Performed by: PREVENTIVE MEDICINE

## 2018-03-30 PROCEDURE — 94375 RESPIRATORY FLOW VOLUME LOOP: CPT | Performed by: PREVENTIVE MEDICINE

## 2018-03-30 PROCEDURE — 8915 PR COMPREHENSIVE PHYSICAL: Performed by: PREVENTIVE MEDICINE

## 2018-03-30 PROCEDURE — 86480 TB TEST CELL IMMUN MEASURE: CPT | Performed by: PREVENTIVE MEDICINE

## 2018-04-02 LAB
M TB TUBERC IFN-G BLD QL: NEGATIVE
M TB TUBERC IFN-G/MITOGEN IGNF BLD: 0
M TB TUBERC IGNF/MITOGEN IGNF CONTROL: 38.77 [IU]/ML
MITOGEN IGNF BCKGRD COR BLD-ACNC: 0.03 [IU]/ML
VZV IGG SER IA-ACNC: POSITIVE

## 2018-04-04 ENCOUNTER — DOCUMENTATION (OUTPATIENT)
Dept: OCCUPATIONAL MEDICINE | Facility: CLINIC | Age: 23
End: 2018-04-04

## 2018-06-22 ENCOUNTER — APPOINTMENT (OUTPATIENT)
Dept: RADIOLOGY | Facility: MEDICAL CENTER | Age: 23
End: 2018-06-22
Attending: EMERGENCY MEDICINE
Payer: MEDICAID

## 2018-06-22 ENCOUNTER — HOSPITAL ENCOUNTER (EMERGENCY)
Facility: MEDICAL CENTER | Age: 23
End: 2018-06-22
Attending: EMERGENCY MEDICINE
Payer: MEDICAID

## 2018-06-22 VITALS
TEMPERATURE: 98 F | SYSTOLIC BLOOD PRESSURE: 115 MMHG | DIASTOLIC BLOOD PRESSURE: 60 MMHG | WEIGHT: 196.43 LBS | HEIGHT: 64 IN | RESPIRATION RATE: 18 BRPM | BODY MASS INDEX: 33.54 KG/M2 | OXYGEN SATURATION: 98 % | HEART RATE: 76 BPM

## 2018-06-22 DIAGNOSIS — R10.9 FLANK PAIN: ICD-10-CM

## 2018-06-22 DIAGNOSIS — E86.0 DEHYDRATION: ICD-10-CM

## 2018-06-22 DIAGNOSIS — R55 SYNCOPE, UNSPECIFIED SYNCOPE TYPE: ICD-10-CM

## 2018-06-22 LAB
ALBUMIN SERPL BCP-MCNC: 4 G/DL (ref 3.2–4.9)
ALBUMIN/GLOB SERPL: 1.3 G/DL
ALP SERPL-CCNC: 66 U/L (ref 30–99)
ALT SERPL-CCNC: 23 U/L (ref 2–50)
ANION GAP SERPL CALC-SCNC: 4 MMOL/L (ref 0–11.9)
APPEARANCE UR: CLEAR
APTT PPP: 28.1 SEC (ref 24.7–36)
AST SERPL-CCNC: 21 U/L (ref 12–45)
BASOPHILS # BLD AUTO: 0.4 % (ref 0–1.8)
BASOPHILS # BLD: 0.03 K/UL (ref 0–0.12)
BILIRUB SERPL-MCNC: 0.5 MG/DL (ref 0.1–1.5)
BILIRUB UR QL STRIP.AUTO: ABNORMAL
BUN SERPL-MCNC: 14 MG/DL (ref 8–22)
CALCIUM SERPL-MCNC: 8.7 MG/DL (ref 8.4–10.2)
CHLORIDE SERPL-SCNC: 110 MMOL/L (ref 96–112)
CO2 SERPL-SCNC: 25 MMOL/L (ref 20–33)
COLOR UR: YELLOW
CREAT SERPL-MCNC: 0.61 MG/DL (ref 0.5–1.4)
EKG IMPRESSION: NORMAL
EOSINOPHIL # BLD AUTO: 0.04 K/UL (ref 0–0.51)
EOSINOPHIL NFR BLD: 0.6 % (ref 0–6.9)
ERYTHROCYTE [DISTWIDTH] IN BLOOD BY AUTOMATED COUNT: 41.7 FL (ref 35.9–50)
GLOBULIN SER CALC-MCNC: 3.2 G/DL (ref 1.9–3.5)
GLUCOSE SERPL-MCNC: 97 MG/DL (ref 65–99)
GLUCOSE UR STRIP.AUTO-MCNC: NEGATIVE MG/DL
HCG SERPL QL: NEGATIVE
HCT VFR BLD AUTO: 40.8 % (ref 37–47)
HGB BLD-MCNC: 13.6 G/DL (ref 12–16)
IMM GRANULOCYTES # BLD AUTO: 0.02 K/UL (ref 0–0.11)
IMM GRANULOCYTES NFR BLD AUTO: 0.3 % (ref 0–0.9)
INR PPP: 1.15 (ref 0.87–1.13)
KETONES UR STRIP.AUTO-MCNC: 15 MG/DL
LEUKOCYTE ESTERASE UR QL STRIP.AUTO: NEGATIVE
LYMPHOCYTES # BLD AUTO: 1.84 K/UL (ref 1–4.8)
LYMPHOCYTES NFR BLD: 25.7 % (ref 22–41)
MCH RBC QN AUTO: 30 PG (ref 27–33)
MCHC RBC AUTO-ENTMCNC: 33.3 G/DL (ref 33.6–35)
MCV RBC AUTO: 89.9 FL (ref 81.4–97.8)
MICRO URNS: ABNORMAL
MONOCYTES # BLD AUTO: 0.8 K/UL (ref 0–0.85)
MONOCYTES NFR BLD AUTO: 11.2 % (ref 0–13.4)
NEUTROPHILS # BLD AUTO: 4.43 K/UL (ref 2–7.15)
NEUTROPHILS NFR BLD: 61.8 % (ref 44–72)
NITRITE UR QL STRIP.AUTO: NEGATIVE
NRBC # BLD AUTO: 0 K/UL
NRBC BLD-RTO: 0 /100 WBC
PH UR STRIP.AUTO: 6.5 [PH]
PLATELET # BLD AUTO: 256 K/UL (ref 164–446)
PMV BLD AUTO: 9.3 FL (ref 9–12.9)
POTASSIUM SERPL-SCNC: 3.3 MMOL/L (ref 3.6–5.5)
PROT SERPL-MCNC: 7.2 G/DL (ref 6–8.2)
PROT UR QL STRIP: NEGATIVE MG/DL
PROTHROMBIN TIME: 14.6 SEC (ref 12–14.6)
RBC # BLD AUTO: 4.54 M/UL (ref 4.2–5.4)
RBC UR QL AUTO: NEGATIVE
SODIUM SERPL-SCNC: 139 MMOL/L (ref 135–145)
SP GR UR STRIP.AUTO: 1.02
TROPONIN I SERPL-MCNC: <0.02 NG/ML (ref 0–0.04)
WBC # BLD AUTO: 7.2 K/UL (ref 4.8–10.8)

## 2018-06-22 PROCEDURE — 85730 THROMBOPLASTIN TIME PARTIAL: CPT

## 2018-06-22 PROCEDURE — 80053 COMPREHEN METABOLIC PANEL: CPT

## 2018-06-22 PROCEDURE — 96361 HYDRATE IV INFUSION ADD-ON: CPT

## 2018-06-22 PROCEDURE — 99284 EMERGENCY DEPT VISIT MOD MDM: CPT

## 2018-06-22 PROCEDURE — 85025 COMPLETE CBC W/AUTO DIFF WBC: CPT

## 2018-06-22 PROCEDURE — 700111 HCHG RX REV CODE 636 W/ 250 OVERRIDE (IP): Performed by: EMERGENCY MEDICINE

## 2018-06-22 PROCEDURE — 84484 ASSAY OF TROPONIN QUANT: CPT

## 2018-06-22 PROCEDURE — 84703 CHORIONIC GONADOTROPIN ASSAY: CPT

## 2018-06-22 PROCEDURE — 93005 ELECTROCARDIOGRAM TRACING: CPT | Performed by: EMERGENCY MEDICINE

## 2018-06-22 PROCEDURE — 36415 COLL VENOUS BLD VENIPUNCTURE: CPT

## 2018-06-22 PROCEDURE — 96365 THER/PROPH/DIAG IV INF INIT: CPT

## 2018-06-22 PROCEDURE — 96375 TX/PRO/DX INJ NEW DRUG ADDON: CPT

## 2018-06-22 PROCEDURE — 71045 X-RAY EXAM CHEST 1 VIEW: CPT

## 2018-06-22 PROCEDURE — 85610 PROTHROMBIN TIME: CPT

## 2018-06-22 PROCEDURE — 81003 URINALYSIS AUTO W/O SCOPE: CPT

## 2018-06-22 PROCEDURE — 700105 HCHG RX REV CODE 258: Performed by: EMERGENCY MEDICINE

## 2018-06-22 RX ORDER — CEPHALEXIN 500 MG/1
500 CAPSULE ORAL 3 TIMES DAILY
Qty: 21 CAP | Refills: 0 | Status: SHIPPED | OUTPATIENT
Start: 2018-06-22 | End: 2018-06-29

## 2018-06-22 RX ORDER — KETOROLAC TROMETHAMINE 30 MG/ML
30 INJECTION, SOLUTION INTRAMUSCULAR; INTRAVENOUS ONCE
Status: COMPLETED | OUTPATIENT
Start: 2018-06-22 | End: 2018-06-22

## 2018-06-22 RX ORDER — CEFTRIAXONE 2 G/1
2 INJECTION, POWDER, FOR SOLUTION INTRAMUSCULAR; INTRAVENOUS ONCE
Status: COMPLETED | OUTPATIENT
Start: 2018-06-22 | End: 2018-06-22

## 2018-06-22 RX ORDER — SODIUM CHLORIDE 9 MG/ML
2000 INJECTION, SOLUTION INTRAVENOUS ONCE
Status: COMPLETED | OUTPATIENT
Start: 2018-06-22 | End: 2018-06-22

## 2018-06-22 RX ORDER — ONDANSETRON 2 MG/ML
4 INJECTION INTRAMUSCULAR; INTRAVENOUS ONCE
Status: COMPLETED | OUTPATIENT
Start: 2018-06-22 | End: 2018-06-22

## 2018-06-22 RX ORDER — MORPHINE SULFATE 4 MG/ML
2 INJECTION, SOLUTION INTRAMUSCULAR; INTRAVENOUS ONCE
Status: COMPLETED | OUTPATIENT
Start: 2018-06-22 | End: 2018-06-22

## 2018-06-22 RX ADMIN — MORPHINE SULFATE 2 MG: 4 INJECTION INTRAVENOUS at 20:59

## 2018-06-22 RX ADMIN — SODIUM CHLORIDE 2000 ML: 9 INJECTION, SOLUTION INTRAVENOUS at 20:06

## 2018-06-22 RX ADMIN — KETOROLAC TROMETHAMINE 30 MG: 30 INJECTION, SOLUTION INTRAMUSCULAR at 22:23

## 2018-06-22 RX ADMIN — CEFTRIAXONE SODIUM 2 G: 2 INJECTION, POWDER, FOR SOLUTION INTRAMUSCULAR; INTRAVENOUS at 21:56

## 2018-06-22 RX ADMIN — ONDANSETRON 4 MG: 2 INJECTION INTRAMUSCULAR; INTRAVENOUS at 21:00

## 2018-06-22 ASSESSMENT — PAIN SCALES - GENERAL
PAINLEVEL_OUTOF10: 8
PAINLEVEL_OUTOF10: 3
PAINLEVEL_OUTOF10: 2
PAINLEVEL_OUTOF10: 2

## 2018-06-22 ASSESSMENT — LIFESTYLE VARIABLES: DO YOU DRINK ALCOHOL: NO

## 2018-06-23 NOTE — ED NOTES
Pt will be discharged home with instructions to follow up with primary care provider, or return to ED if symptoms worsen.  Pt verbalizes understanding of educational materials provided during this visit and agrees to follow our recommendations.  No exacerbations of initial presentations are noted.  Scripts are given upon dismissal from our facility.  Ambulates independently and denies any new needs.

## 2018-06-23 NOTE — ED PROVIDER NOTES
ED Provider Note    CHIEF COMPLAINT  Chief Complaint   Patient presents with   • Syncope     pt fainted while in [employer]. pt hit head and had pos loc   • Unable to Urinate     Pt hasn't been able to void for 5 days. States in the past she has needed to be cathed for relief.        HPI  Mary Carmen Sanchez is a 23 y.o. female who presents with a report that she apparently had a urinary tract infection recently and was prescribed antibiotics but only took them for one day and then stopped because she felt a little better. She apparently has had poor fluid intake for the past 4-5 days and while in line to get food at [employer] apparently passed out. She hit her head but does not have any signs of trauma outwardly. She denies any fever, chills, sweats or other complaints. She does have discomfort in the back around the kidneys. Denies any abnormal menstrual cycle    REVIEW OF SYSTEMS  See HPI for further details. All other systems are negative.     PAST MEDICAL HISTORY  Past Medical History:   Diagnosis Date   • Anxiety    • Asthma     history of asthma; childhood asthma   • Depression     history of       FAMILY HISTORY  Family History   Problem Relation Age of Onset   • No Known Problems Daughter    • No Known Problems Daughter    • Diabetes Father    • Diabetes Paternal Uncle    • Cancer Maternal Grandfather        SOCIAL HISTORY   reports that she has been smoking.  She has never used smokeless tobacco. She reports that she does not drink alcohol or use drugs.    SURGICAL HISTORY  Past Surgical History:   Procedure Laterality Date   • GASTRIC SLEEVE LAPAROSCOPY  2/13/2018    Procedure: GASTRIC SLEEVE LAPAROSCOPY;  Surgeon: Quintin Nguyen M.D.;  Location: SURGERY [facility];  Service: General   • PRIMARY C SECTION  3/28/2015    Performed by Lucy Valdez M.D. at LABOR AND DELIVERY   • CERVICAL CERCLAGE  2/20/2015    Performed by Quinn Grimaldo M.D. at LABOR AND DELIVERY   • TONSILLECTOMY  2000  "  • OTHER      wisdom teeth       CURRENT MEDICATIONS  Home Medications    **Home medications have not yet been reviewed for this encounter**         ALLERGIES  No Known Allergies    PHYSICAL EXAM  VITAL SIGNS: /73   Pulse 100   Temp 36.4 °C (97.6 °F)   Resp 20   Ht 1.626 m (5' 4\")   Wt 89.1 kg (196 lb 6.9 oz)   BMI 33.72 kg/m²    Constitutional: Well developed, Well nourished, No acute distress, Non-toxic appearance.   HENT: Normocephalic, Atraumatic, Bilateral external ears normal, Oropharynx is clear mucous membranes are dry. No oral exudates or nasal discharge.   Eyes: Pupils are equal round and reactive, EOMI, Conjunctiva normal, No discharge.   Neck: Normal range of motion, No tenderness, Supple, No stridor. No meningismus.  Lymphatic: No lymphadenopathy noted.   Cardiovascular: Regular rate and rhythm without murmur rub or gallop.  Thorax & Lungs: Clear breath sounds bilaterally without wheezes, rhonchi or rales. There is no chest wall tenderness.   Abdomen: Soft non-tender non-distended. There is no rebound or guarding. No organomegaly is appreciated. Bowel sounds are normal.  Skin: Normal without rash.   Back: No CVA or spinal tenderness.   Extremities: Intact distal pulses, No edema, No tenderness, No cyanosis, No clubbing. Capillary refill is less than 2 seconds.  Musculoskeletal: Good range of motion in all major joints. No tenderness to palpation or major deformities noted.   Neurologic: Alert & oriented x 3, Normal motor function, Normal sensory function, No focal deficits noted. Reflexes are normal.  Psychiatric: Affect normal, Judgment normal, Mood normal. There is no suicidal ideation or patient reported hallucinations.     EKG  EKG Interpretation    Interpreted by me    Rhythm: normal sinus   Rate: normal  Axis: normal  Ectopy: none  Conduction: normal  ST Segments: no acute change  T Waves: no acute change  Q Waves: none    Clinical Impression: no acute changes and normal " EKG    RADIOLOGY/PROCEDURES  DX-CHEST-PORTABLE (1 VIEW)   Final Result         1. No acute cardiopulmonary abnormalities are identified.            COURSE & MEDICAL DECISION MAKING  Pertinent Labs & Imaging studies reviewed. (See chart for details)  The patient is with symptoms that are concerning for pyelonephritis. She recently had urinary tract infection which puts her at risk for this progression of infection. Her mucous membranes are quite dry and therefore gave her 1 L fluid bolus and this was also given for syncopal history    EKG shows no evidence of dysrhythmia or acute ischemic changes.  Chest x-ray shows no evidence of airspace disease, cardiomegaly, mass, widened mediastinum    Patient's laboratory evaluation reveals no leukocytosis, shift, anemia and her only electrolyte derangement is low potassium state at 3.3 which she can correct with diet at home. Urinalysis does show mild ketosis but no evidence of definitive infection however I did administer Rocephin 1 g given that she does have a history of recently diagnosed urinary tract infection and continued flank pain.    On recheck at approximately 9 PM after fluid she appeared to have moist because membranes and was better hydrated    She ambulated to the bathroom under her own power well and I feel that she is a good candidate for outpatient therapy with Keflex. She understands that she should copiously hydrate and return if any significant change in symptoms and is discharged in stable condition    FINAL IMPRESSION  1. Syncope, unspecified syncope type    2. Flank pain    3. Dehydration             Electronically signed by: Sathya Xavier, 6/22/2018 8:58 PM

## 2018-06-23 NOTE — ED NOTES
".  Chief Complaint   Patient presents with   • Syncope     pt fainted while in [employer]. pt hit head and had pos loc   • Unable to Urinate     Pt hasn't been able to void for 5 days. States in the past she has needed to be cathed for relief.      ./73   Pulse 100   Temp 36.4 °C (97.6 °F)   Resp 20   Ht 1.626 m (5' 4\")   Wt 89.1 kg (196 lb 6.9 oz)   BMI 33.72 kg/m²     "

## 2018-06-25 ENCOUNTER — APPOINTMENT (OUTPATIENT)
Dept: RADIOLOGY | Facility: MEDICAL CENTER | Age: 23
End: 2018-06-25
Attending: EMERGENCY MEDICINE
Payer: MEDICAID

## 2018-06-25 ENCOUNTER — HOSPITAL ENCOUNTER (EMERGENCY)
Facility: MEDICAL CENTER | Age: 23
End: 2018-06-25
Attending: EMERGENCY MEDICINE
Payer: MEDICAID

## 2018-06-25 VITALS
TEMPERATURE: 97.7 F | DIASTOLIC BLOOD PRESSURE: 66 MMHG | SYSTOLIC BLOOD PRESSURE: 113 MMHG | WEIGHT: 197.75 LBS | HEIGHT: 64 IN | RESPIRATION RATE: 18 BRPM | HEART RATE: 56 BPM | OXYGEN SATURATION: 100 % | BODY MASS INDEX: 33.76 KG/M2

## 2018-06-25 DIAGNOSIS — K59.00 CONSTIPATION, UNSPECIFIED CONSTIPATION TYPE: ICD-10-CM

## 2018-06-25 DIAGNOSIS — R30.0 DYSURIA: ICD-10-CM

## 2018-06-25 DIAGNOSIS — R10.84 GENERALIZED ABDOMINAL PAIN: ICD-10-CM

## 2018-06-25 LAB
ALBUMIN SERPL BCP-MCNC: 3.4 G/DL (ref 3.2–4.9)
ALBUMIN/GLOB SERPL: 1.1 G/DL
ALP SERPL-CCNC: 59 U/L (ref 30–99)
ALT SERPL-CCNC: 21 U/L (ref 2–50)
ANION GAP SERPL CALC-SCNC: 6 MMOL/L (ref 0–11.9)
APPEARANCE UR: ABNORMAL
AST SERPL-CCNC: 20 U/L (ref 12–45)
BASOPHILS # BLD AUTO: 0.3 % (ref 0–1.8)
BASOPHILS # BLD: 0.02 K/UL (ref 0–0.12)
BILIRUB SERPL-MCNC: 0.6 MG/DL (ref 0.1–1.5)
BILIRUB UR QL STRIP.AUTO: NEGATIVE
BUN SERPL-MCNC: 11 MG/DL (ref 8–22)
CALCIUM SERPL-MCNC: 8.6 MG/DL (ref 8.4–10.2)
CHLORIDE SERPL-SCNC: 107 MMOL/L (ref 96–112)
CO2 SERPL-SCNC: 25 MMOL/L (ref 20–33)
COLOR UR: YELLOW
CREAT SERPL-MCNC: 0.67 MG/DL (ref 0.5–1.4)
EOSINOPHIL # BLD AUTO: 0.04 K/UL (ref 0–0.51)
EOSINOPHIL NFR BLD: 0.7 % (ref 0–6.9)
EPI CELLS #/AREA URNS HPF: ABNORMAL /HPF
ERYTHROCYTE [DISTWIDTH] IN BLOOD BY AUTOMATED COUNT: 40.7 FL (ref 35.9–50)
GLOBULIN SER CALC-MCNC: 3.2 G/DL (ref 1.9–3.5)
GLUCOSE SERPL-MCNC: 90 MG/DL (ref 65–99)
GLUCOSE UR STRIP.AUTO-MCNC: NEGATIVE MG/DL
HCG SERPL QL: NEGATIVE
HCT VFR BLD AUTO: 38.4 % (ref 37–47)
HGB BLD-MCNC: 12.9 G/DL (ref 12–16)
IMM GRANULOCYTES # BLD AUTO: 0.01 K/UL (ref 0–0.11)
IMM GRANULOCYTES NFR BLD AUTO: 0.2 % (ref 0–0.9)
KETONES UR STRIP.AUTO-MCNC: ABNORMAL MG/DL
LEUKOCYTE ESTERASE UR QL STRIP.AUTO: NEGATIVE
LIPASE SERPL-CCNC: 28 U/L (ref 7–58)
LYMPHOCYTES # BLD AUTO: 2.9 K/UL (ref 1–4.8)
LYMPHOCYTES NFR BLD: 48.7 % (ref 22–41)
MCH RBC QN AUTO: 29.9 PG (ref 27–33)
MCHC RBC AUTO-ENTMCNC: 33.6 G/DL (ref 33.6–35)
MCV RBC AUTO: 88.9 FL (ref 81.4–97.8)
MICRO URNS: ABNORMAL
MONOCYTES # BLD AUTO: 0.36 K/UL (ref 0–0.85)
MONOCYTES NFR BLD AUTO: 6 % (ref 0–13.4)
MUCOUS THREADS #/AREA URNS HPF: ABNORMAL /HPF
NEUTROPHILS # BLD AUTO: 2.63 K/UL (ref 2–7.15)
NEUTROPHILS NFR BLD: 44.1 % (ref 44–72)
NITRITE UR QL STRIP.AUTO: NEGATIVE
NRBC # BLD AUTO: 0 K/UL
NRBC BLD-RTO: 0 /100 WBC
PH UR STRIP.AUTO: 6 [PH]
PLATELET # BLD AUTO: 244 K/UL (ref 164–446)
PMV BLD AUTO: 9.1 FL (ref 9–12.9)
POTASSIUM SERPL-SCNC: 3.3 MMOL/L (ref 3.6–5.5)
PROT SERPL-MCNC: 6.6 G/DL (ref 6–8.2)
PROT UR QL STRIP: NEGATIVE MG/DL
RBC # BLD AUTO: 4.32 M/UL (ref 4.2–5.4)
RBC # URNS HPF: ABNORMAL /HPF
RBC UR QL AUTO: NEGATIVE
SODIUM SERPL-SCNC: 138 MMOL/L (ref 135–145)
SP GR UR REFRACTOMETRY: 1.02
WBC # BLD AUTO: 6 K/UL (ref 4.8–10.8)
WBC #/AREA URNS HPF: ABNORMAL /HPF
YEAST #/AREA URNS HPF: ABNORMAL /HPF

## 2018-06-25 PROCEDURE — 84703 CHORIONIC GONADOTROPIN ASSAY: CPT

## 2018-06-25 PROCEDURE — 96375 TX/PRO/DX INJ NEW DRUG ADDON: CPT

## 2018-06-25 PROCEDURE — 93005 ELECTROCARDIOGRAM TRACING: CPT | Performed by: EMERGENCY MEDICINE

## 2018-06-25 PROCEDURE — 700117 HCHG RX CONTRAST REV CODE 255: Performed by: EMERGENCY MEDICINE

## 2018-06-25 PROCEDURE — 83690 ASSAY OF LIPASE: CPT

## 2018-06-25 PROCEDURE — 99285 EMERGENCY DEPT VISIT HI MDM: CPT

## 2018-06-25 PROCEDURE — 80053 COMPREHEN METABOLIC PANEL: CPT

## 2018-06-25 PROCEDURE — 700111 HCHG RX REV CODE 636 W/ 250 OVERRIDE (IP): Performed by: EMERGENCY MEDICINE

## 2018-06-25 PROCEDURE — 85025 COMPLETE CBC W/AUTO DIFF WBC: CPT

## 2018-06-25 PROCEDURE — 96374 THER/PROPH/DIAG INJ IV PUSH: CPT | Mod: XU

## 2018-06-25 PROCEDURE — 81001 URINALYSIS AUTO W/SCOPE: CPT

## 2018-06-25 PROCEDURE — 74177 CT ABD & PELVIS W/CONTRAST: CPT

## 2018-06-25 RX ORDER — POLYETHYLENE GLYCOL 3350 17 G/17G
17 POWDER, FOR SOLUTION ORAL
Qty: 10 EACH | Refills: 0 | Status: SHIPPED | OUTPATIENT
Start: 2018-06-25 | End: 2018-11-05

## 2018-06-25 RX ORDER — CIPROFLOXACIN 500 MG/1
500 TABLET, FILM COATED ORAL 2 TIMES DAILY
Qty: 14 TAB | Refills: 0 | Status: SHIPPED | OUTPATIENT
Start: 2018-06-25 | End: 2018-07-02

## 2018-06-25 RX ORDER — ONDANSETRON 2 MG/ML
4 INJECTION INTRAMUSCULAR; INTRAVENOUS ONCE
Status: COMPLETED | OUTPATIENT
Start: 2018-06-25 | End: 2018-06-25

## 2018-06-25 RX ORDER — FLUCONAZOLE 200 MG/1
TABLET ORAL
Qty: 2 TAB | Refills: 0 | Status: SHIPPED | OUTPATIENT
Start: 2018-06-25 | End: 2018-11-05

## 2018-06-25 RX ORDER — PHENAZOPYRIDINE HYDROCHLORIDE 200 MG/1
200 TABLET, FILM COATED ORAL 3 TIMES DAILY PRN
Qty: 10 TAB | Refills: 0 | Status: SHIPPED | OUTPATIENT
Start: 2018-06-25 | End: 2018-11-05

## 2018-06-25 RX ORDER — MORPHINE SULFATE 4 MG/ML
4 INJECTION, SOLUTION INTRAMUSCULAR; INTRAVENOUS ONCE
Status: COMPLETED | OUTPATIENT
Start: 2018-06-25 | End: 2018-06-25

## 2018-06-25 RX ADMIN — MORPHINE SULFATE 4 MG: 4 INJECTION INTRAVENOUS at 20:00

## 2018-06-25 RX ADMIN — IOHEXOL 100 ML: 350 INJECTION, SOLUTION INTRAVENOUS at 21:41

## 2018-06-25 RX ADMIN — ONDANSETRON 4 MG: 2 INJECTION INTRAMUSCULAR; INTRAVENOUS at 19:58

## 2018-06-25 ASSESSMENT — PAIN SCALES - GENERAL
PAINLEVEL_OUTOF10: 4
PAINLEVEL_OUTOF10: 8

## 2018-06-26 NOTE — ED NOTES
Mary Carmen Sanchez 23 y.o. female     Chief Complaint   Patient presents with   • Abdominal Pain     Lower and RLQ.  Seen 3 days ago for same and Dx with UTI.   Pain increasing.        Pt returned to lobby and educated on triage process.  Advised to notify RN with changes or concerns.

## 2018-06-26 NOTE — ED PROVIDER NOTES
ED Provider Note    CHIEF COMPLAINT  Chief Complaint   Patient presents with   • Abdominal Pain     Lower and RLQ.  Seen 3 days ago for same and Dx with UTI.   Pain increasing.       HPI  Mary Carmen Sanchez is a 23 y.o. female who presents to the emergency department complaining of continued abdominal discomfort and low back discomfort.  For the last 2 days the patient has been having abdominal pain mostly in the right lower quadrant but also in the mid low back and she has been having a stinging pain with urination.  She has had some vomiting and she has been constipated and has not had a bowel movement in 5 days.  The patient was just recently in the emergency department and apparently found to have a urinary tract infection and was given a prescription for Keflex which she has been taking for the last 3 days but is not feeling any better so she is come back to the emergency department for further evaluation    REVIEW OF SYSTEMS no chest pain or palpitations hemoptysis or shortness of breath.  No black or bloody stool or emesis.  All other systems negative    PAST MEDICAL HISTORY  Past Medical History:   Diagnosis Date   • Anxiety    • Asthma     history of asthma; childhood asthma   • Depression     history of       FAMILY HISTORY  Family History   Problem Relation Age of Onset   • No Known Problems Daughter    • No Known Problems Daughter    • Diabetes Father    • Diabetes Paternal Uncle    • Cancer Maternal Grandfather        SOCIAL HISTORY  Social History     Social History   • Marital status: Single     Spouse name: N/A   • Number of children: N/A   • Years of education: N/A     Social History Main Topics   • Smoking status: Current Every Day Smoker   • Smokeless tobacco: Never Used      Comment: vape    • Alcohol use No   • Drug use: No      Comment: vape   • Sexual activity: Not Currently     Partners: Male     Birth control/ protection: IUD     Other Topics Concern   • Not on file     Social History  "Narrative   • No narrative on file       SURGICAL HISTORY  Past Surgical History:   Procedure Laterality Date   • GASTRIC SLEEVE LAPAROSCOPY  2/13/2018    Procedure: GASTRIC SLEEVE LAPAROSCOPY;  Surgeon: Quintin Nguyen M.D.;  Location: SURGERY [facility];  Service: General   • PRIMARY C SECTION  3/28/2015    Performed by Lucy Valdez M.D. at LABOR AND DELIVERY   • CERVICAL CERCLAGE  2/20/2015    Performed by Quinn Grimaldo M.D. at LABOR AND DELIVERY   • TONSILLECTOMY  2000   • OTHER      wisdom teeth       CURRENT MEDICATIONS  Home Medications    **Home medications have not yet been reviewed for this encounter**         ALLERGIES  No Known Allergies    PHYSICAL EXAM  VITAL SIGNS: /77   Pulse 74   Temp 36.4 °C (97.5 °F)   Resp 18   Ht 1.626 m (5' 4\")   Wt 89.7 kg (197 lb 12 oz)   SpO2 97%   BMI 33.94 kg/m²    Oxygen saturation is interpreted as adequate  Constitutional: Awake and nontoxic-appearing  HENT: Mucous membranes are dry throat clear  Eyes: No erythema or discharge or jaundice  Neck: Trachea midline no JVD  Cardiovascular: Regular rate and rhythm  Lungs: Clear and equal bilaterally with no apparent difficulty breathing  Abdomen/Back: Soft and mildly diffusely tender but no rebound guarding or peritoneal findings bowel sounds are present.  Skin: Warm and dry  Musculoskeletal: No acute bony deformity  Neurologic: Awake and verbal moving all extremities    CHART REVIEW  I reviewed the patient's ER chart from June 22, 2018 the chart says the patient was here for syncope flank pain dehydration and was recently diagnosed with a urinary tract infection.  She was given intravenous ceftriaxone in the emergency department and given a prescription for Keflex.    Laboratory  Tonight in the emergency department CBC shows a normal white blood cell count of 6.0 with hemoglobin of 12.9 and basic metabolic panel shows a minimally depressed potassium of 3.3 LFTs and lipase are unremarkable pregnancy " test is negative    EKG interpretation  Twelve-lead EKG shows sinus rhythm 48 bpm there is no pathologic ST elevation or depression or ectopy    Radiology  CT-ABDOMEN-PELVIS WITH   Final Result         1.  No acute abnormality.          MEDICAL DECISION MAKING and DISPOSITION  In the emergency department the patient was given intravenous morphine and Zofran.  Clinically she is well appearing, I reviewed all the findings with her.  She does have a mildly abnormal urinalysis with a few white blood cells but also some yeast and she says that she does develop yeast infections on antibiotics.  The patient says that her urinary tract infection usually clear up after just a couple of days on antibiotics but this does not seem to be going away.  I think it will be safe to continue treatment on an outpatient basis I have written a prescription for Cipro and Pyridium and Diflucan and also a prescription for MiraLAX at nighttime until the patient has a bowel movement since I think that she is probably having abdominal pain from constipation with no bowel movement over the last 5 days.  If she feels she is having new or worsening symptoms she is to return here once for recheck and otherwise she is to contact her primary care doctor in the morning and arrange office follow-up this week    IMPRESSION  1.  Abdominal pain  2.  Constipation  3.  Abnormal urinalysis      Electronically signed by: Balaji Calhoun, 6/25/2018 10:01 PM

## 2018-06-26 NOTE — ED NOTES
Pt dc'd home with multiple rx, cipro,pyridium, diflucan and miralax, encouraged to f/u with pcp, opportunity provided to ask questions, pt given work release form at her request.  Pt ambulated out of ed with significant other.

## 2018-06-26 NOTE — DISCHARGE INSTRUCTIONS
Drink lots of fluids to maintain hydration.  Use the MiraLAX nightly until you are having bowel movements.  Take the first dose of Diflucan as soon as possible and take the second dose 7 days later.  Return here if you feel you are having new or worsening symptoms.  Call your primary care doctor first thing in the morning and arrange office recheck during the week

## 2018-07-01 LAB — EKG IMPRESSION: NORMAL

## 2018-07-13 ENCOUNTER — NON-PROVIDER VISIT (OUTPATIENT)
Dept: OCCUPATIONAL MEDICINE | Facility: CLINIC | Age: 23
End: 2018-07-13
Payer: COMMERCIAL

## 2018-07-13 ENCOUNTER — APPOINTMENT (OUTPATIENT)
Dept: RADIOLOGY | Facility: MEDICAL CENTER | Age: 23
End: 2018-07-13
Attending: EMERGENCY MEDICINE
Payer: COMMERCIAL

## 2018-07-13 ENCOUNTER — HOSPITAL ENCOUNTER (EMERGENCY)
Facility: MEDICAL CENTER | Age: 23
End: 2018-07-13
Attending: EMERGENCY MEDICINE
Payer: COMMERCIAL

## 2018-07-13 VITALS
SYSTOLIC BLOOD PRESSURE: 120 MMHG | DIASTOLIC BLOOD PRESSURE: 62 MMHG | BODY MASS INDEX: 32.78 KG/M2 | RESPIRATION RATE: 16 BRPM | WEIGHT: 192 LBS | HEIGHT: 64 IN | TEMPERATURE: 98.2 F | HEART RATE: 85 BPM

## 2018-07-13 DIAGNOSIS — Z02.83 ENCOUNTER FOR DRUG SCREENING: ICD-10-CM

## 2018-07-13 DIAGNOSIS — S20.212A CONTUSION OF LEFT CHEST WALL, INITIAL ENCOUNTER: ICD-10-CM

## 2018-07-13 PROCEDURE — 99284 EMERGENCY DEPT VISIT MOD MDM: CPT

## 2018-07-13 PROCEDURE — 82075 ASSAY OF BREATH ETHANOL: CPT | Performed by: INTERNAL MEDICINE

## 2018-07-13 PROCEDURE — 71120 X-RAY EXAM BREASTBONE 2/>VWS: CPT

## 2018-07-13 PROCEDURE — 700102 HCHG RX REV CODE 250 W/ 637 OVERRIDE(OP): Performed by: EMERGENCY MEDICINE

## 2018-07-13 PROCEDURE — A9270 NON-COVERED ITEM OR SERVICE: HCPCS | Performed by: EMERGENCY MEDICINE

## 2018-07-13 PROCEDURE — 80305 DRUG TEST PRSMV DIR OPT OBS: CPT | Performed by: INTERNAL MEDICINE

## 2018-07-13 PROCEDURE — 99026 IN-HOSPITAL ON CALL SERVICE: CPT | Performed by: INTERNAL MEDICINE

## 2018-07-13 RX ORDER — NAPROXEN 500 MG/1
500 TABLET ORAL 2 TIMES DAILY WITH MEALS
Qty: 14 TAB | Refills: 0 | Status: SHIPPED | OUTPATIENT
Start: 2018-07-13 | End: 2018-11-05

## 2018-07-13 RX ORDER — IBUPROFEN 600 MG/1
600 TABLET ORAL ONCE
Status: COMPLETED | OUTPATIENT
Start: 2018-07-13 | End: 2018-07-13

## 2018-07-13 RX ADMIN — IBUPROFEN 600 MG: 600 TABLET, FILM COATED ORAL at 21:00

## 2018-07-13 ASSESSMENT — PAIN SCALES - GENERAL: PAINLEVEL_OUTOF10: 3
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
## 2018-07-14 LAB
AMP AMPHETAMINE: NORMAL
BAR BARBITURATES: NORMAL
BREATH ALCOHOL COMMENT: NORMAL
BZO BENZODIAZEPINES: NORMAL
COC COCAINE: NORMAL
INT CON NEG: NEGATIVE
INT CON POS: POSITIVE
MDMA ECSTASY: NORMAL
MET METHAMPHETAMINES: NORMAL
MTD METHADONE: NORMAL
OPI OPIATES: NORMAL
OXY OXYCODONE: NORMAL
PCP PHENCYCLIDINE: NORMAL
POC BREATHALIZER: 0 PERCENT (ref 0–0.01)
POC URINE DRUG SCREEN OCDRS: NORMAL
THC: NORMAL

## 2018-07-14 NOTE — ED NOTES
Discharge instructions given to pt. Prescriptions handed to pt at bedside. Pt educated, verbalizes understanding. All belongings accounted for. Pt ambulated out of ED with steady gait to go home.
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
## 2018-07-14 NOTE — DISCHARGE INSTRUCTIONS
Blunt Chest Trauma  OK to return to work on Monday July 16th  Blunt chest trauma is an injury that is caused by a hard, direct hit (blow) to the chest. The blow can be strong enough to injure multiple body parts. Blunt chest trauma often results in bruised or broken (fractured) ribs. In many cases, the soft tissue in the chest wall is also injured, and this causes pain and bruising. Internal organs, such as the heart and lungs, can become injured as well.  Blunt chest trauma can lead to serious medical problems. This injury requires immediate medical care.  What are the causes?  Causes of blunt chest trauma include:  · Motor vehicle collisions.  · Falls.  · Physical violence.  · Sports injuries.  What are the signs or symptoms?  Symptoms of this condition include:  · Chest pain. The pain may be worse when you move or breathe deeply.  · Shortness of breath.  · Light-headedness.  · Bruising.  · Tenderness.  · Swelling.  How is this diagnosed?  This condition is diagnosed with a medical history and physical exam. You may also have imaging tests, including:  · X-rays.  · Ultrasounds.  · CT scans.  · MRI.  How is this treated?  Treatment for this condition varies depending on the type of injury you have and its severity. You may need to stay in the hospital until you recover. Treatment may include:  · Pain medicine. You may be given pain medicine through an IV tube at first. You may also be given over-the-counter and  · prescription pain relievers.  · Tubes or other devices to help you breathe.  · Surgery to repair broken ribs and fix the surrounding damaged tissue and organs.  Follow these instructions at home:  · If directed, apply ice to the injured area:  ¨ Put ice in a plastic bag.  ¨ Place a towel between your skin and the bag.  ¨ Leave the ice on for 20 minutes, 2-3 times per day.  · Take over-the-counter and prescription medicines only as told by your health care provider.  · Keep all follow-up visits as told by  your health care provider. This is important.  Contact a health care provider if:  · Your pain gets worse after treatment.  · You have nausea or you vomit.  · You have pain in your abdomen.  · You have a fever.  · You feel dizzy or weak.  Get help right away if:  · You have shortness of breath.  · You cough up blood.  · You faint.  · You have severe chest pain. This may come with other symptoms, such as:  ¨ Dizziness.  ¨ Shortness of breath.  ¨ Pain in your neck, jaw, or back, or in one arm or both arms.  This information is not intended to replace advice given to you by your health care provider. Make sure you discuss any questions you have with your health care provider.  Document Released: 01/25/2006 Document Revised: 05/31/2017 Document Reviewed: 06/15/2016  [employer] Interactive Patient Education © 2017 [employer]

## 2018-07-16 ENCOUNTER — OCCUPATIONAL MEDICINE (OUTPATIENT)
Dept: OCCUPATIONAL MEDICINE | Facility: CLINIC | Age: 23
End: 2018-07-16
Payer: COMMERCIAL

## 2018-07-16 VITALS
WEIGHT: 190 LBS | HEART RATE: 93 BPM | SYSTOLIC BLOOD PRESSURE: 114 MMHG | DIASTOLIC BLOOD PRESSURE: 64 MMHG | OXYGEN SATURATION: 97 % | RESPIRATION RATE: 18 BRPM | HEIGHT: 64 IN | BODY MASS INDEX: 32.44 KG/M2 | TEMPERATURE: 97.8 F

## 2018-07-16 DIAGNOSIS — S20.219D CONTUSION OF CHEST WALL, UNSPECIFIED LATERALITY, SUBSEQUENT ENCOUNTER: ICD-10-CM

## 2018-07-16 PROCEDURE — 99204 OFFICE O/P NEW MOD 45 MIN: CPT | Performed by: PREVENTIVE MEDICINE

## 2018-07-16 RX ORDER — TRAMADOL HYDROCHLORIDE 50 MG/1
50 TABLET ORAL EVERY 6 HOURS PRN
Qty: 20 TAB | Refills: 0 | Status: SHIPPED | OUTPATIENT
Start: 2018-07-16 | End: 2018-07-23

## 2018-07-16 ASSESSMENT — PAIN SCALES - GENERAL: PAINLEVEL: 8=MODERATE-SEVERE PAIN
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
## 2018-09-18 ENCOUNTER — HOSPITAL ENCOUNTER (EMERGENCY)
Facility: MEDICAL CENTER | Age: 23
End: 2018-09-18
Attending: EMERGENCY MEDICINE
Payer: COMMERCIAL

## 2018-09-18 ENCOUNTER — APPOINTMENT (OUTPATIENT)
Dept: RADIOLOGY | Facility: MEDICAL CENTER | Age: 23
End: 2018-09-18
Attending: EMERGENCY MEDICINE
Payer: COMMERCIAL

## 2018-09-18 ENCOUNTER — OCCUPATIONAL MEDICINE (OUTPATIENT)
Dept: OCCUPATIONAL MEDICINE | Facility: CLINIC | Age: 23
End: 2018-09-18
Payer: MEDICAID

## 2018-09-18 VITALS
HEART RATE: 74 BPM | TEMPERATURE: 97.5 F | WEIGHT: 190 LBS | DIASTOLIC BLOOD PRESSURE: 64 MMHG | SYSTOLIC BLOOD PRESSURE: 110 MMHG | OXYGEN SATURATION: 98 % | RESPIRATION RATE: 16 BRPM | HEIGHT: 64 IN | BODY MASS INDEX: 32.44 KG/M2

## 2018-09-18 VITALS
WEIGHT: 182.1 LBS | SYSTOLIC BLOOD PRESSURE: 103 MMHG | OXYGEN SATURATION: 98 % | HEART RATE: 61 BPM | TEMPERATURE: 98.2 F | BODY MASS INDEX: 31.09 KG/M2 | HEIGHT: 64 IN | DIASTOLIC BLOOD PRESSURE: 64 MMHG | RESPIRATION RATE: 18 BRPM

## 2018-09-18 DIAGNOSIS — S20.219D CONTUSION OF CHEST WALL, UNSPECIFIED LATERALITY, SUBSEQUENT ENCOUNTER: ICD-10-CM

## 2018-09-18 DIAGNOSIS — R07.89 CHEST WALL PAIN: ICD-10-CM

## 2018-09-18 PROCEDURE — 700111 HCHG RX REV CODE 636 W/ 250 OVERRIDE (IP): Performed by: EMERGENCY MEDICINE

## 2018-09-18 PROCEDURE — 99213 OFFICE O/P EST LOW 20 MIN: CPT | Performed by: PREVENTIVE MEDICINE

## 2018-09-18 PROCEDURE — 99284 EMERGENCY DEPT VISIT MOD MDM: CPT

## 2018-09-18 PROCEDURE — 71046 X-RAY EXAM CHEST 2 VIEWS: CPT

## 2018-09-18 PROCEDURE — 96372 THER/PROPH/DIAG INJ SC/IM: CPT

## 2018-09-18 RX ORDER — KETOROLAC TROMETHAMINE 10 MG/1
10 TABLET, FILM COATED ORAL 3 TIMES DAILY PRN
Qty: 15 TAB | Refills: 0 | Status: SHIPPED | OUTPATIENT
Start: 2018-09-18 | End: 2018-11-05

## 2018-09-18 RX ORDER — TIZANIDINE 4 MG/1
4 TABLET ORAL
Qty: 20 TAB | Refills: 0 | Status: SHIPPED | OUTPATIENT
Start: 2018-09-18 | End: 2018-11-05

## 2018-09-18 RX ORDER — KETOROLAC TROMETHAMINE 30 MG/ML
60 INJECTION, SOLUTION INTRAMUSCULAR; INTRAVENOUS ONCE
Status: COMPLETED | OUTPATIENT
Start: 2018-09-18 | End: 2018-09-18

## 2018-09-18 RX ADMIN — KETOROLAC TROMETHAMINE 60 MG: 30 INJECTION, SOLUTION INTRAMUSCULAR at 02:56

## 2018-09-18 ASSESSMENT — ENCOUNTER SYMPTOMS
WHEEZING: 0
PALPITATIONS: 0
TINGLING: 0
SHORTNESS OF BREATH: 0
SENSORY CHANGE: 0

## 2018-09-18 ASSESSMENT — PAIN SCALES - GENERAL
PAINLEVEL_OUTOF10: 9
PAINLEVEL_OUTOF10: 7
PAINLEVEL: 9=SEVERE PAIN
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
## 2018-09-18 NOTE — LETTER
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    Medical Information   Related to Industrial Injury?   ***   Subjective Complaints:      Objective Findings:     Pre-Existing Condition(s):     Assessment:        Status:    Permanent Disability:     Plan:      Diagnostics:      Comments:       Disability Information   Status:      From:     Through:   Restrictions are:     Physical Restrictions   Sitting:    Standing:    Stooping:    Bending:      Squatting:    Walking:    Climbing:    Pushing:      Pulling:    Other:    Reaching Above Shoulder (L):   Reaching Above Shoulder (R):       Reaching Below Shoulder (L):    Reaching Below Shoulder (R):      Not to exceed Weight Limits   Carrying(hrs):   Weight Limit(lb):   Lifting(hrs):   Weight  Limit(lb):     Comments:      Repetitive Actions   Hands: i.e. Fine Manipulations from Grasping:     Feet: i.e. Operating Foot Controls:     Driving / Operate Machinery:     Physician Name: Lenore Raymond Physician Signature:   e-Signature:  , Medical Director   Clinic Name / Location: [facility], EMERGENCY DEPT  [address] [place] [id number] [phone]     Clinic Phone Number: Dept: [phone]   Appointment Time:  Visit Start Time:    Check-In Time:  2:04 AM Visit Discharge Time:    Original-Treating Physician or Chiropractor    Page 2-Insurer/TPA    Page 3-Employer    Page 4-Employee
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
## 2018-09-18 NOTE — LETTER
[facility], EMERGENCY DEPT   [address] [address]  Phone: Dept: [phone] - Fax:        [facility] Progress Report and Disability Certification  Date of Service: 9/18/2018   No Show:  No  Date / Time of Next Visit:     Claim Information   Patient Name: Mary Carmen Sanchez  Claim Number:     Employer: [employer]  Date of Injury: 7/13/2018     Insurer / TPA: Workers [employer] ID / SSN: [id number]    Occupation: [employer] Diagnosis: The encounter diagnosis was Chest wall pain.    Medical Information   Related to Industrial Injury?   ***   Subjective Complaints:      Objective Findings:     Pre-Existing Condition(s):     Assessment:        Status:    Permanent Disability:     Plan:      Diagnostics:      Comments:       Disability Information   Status:      From:     Through:   Restrictions are:     Physical Restrictions   Sitting:    Standing:    Stooping:    Bending:      Squatting:    Walking:    Climbing:    Pushing:      Pulling:    Other:    Reaching Above Shoulder (L):   Reaching Above Shoulder (R):       Reaching Below Shoulder (L):    Reaching Below Shoulder (R):      Not to exceed Weight Limits   Carrying(hrs):   Weight Limit(lb):   Lifting(hrs):   Weight  Limit(lb):     Comments:      Repetitive Actions   Hands: i.e. Fine Manipulations from Grasping:     Feet: i.e. Operating Foot Controls:     Driving / Operate Machinery:     Physician Name: Lenore Raymond Physician Signature:   e-Signature:  , Medical Director   Clinic Name / Location: [facility], EMERGENCY DEPT  [address] [place] [id number] [phone]     Clinic Phone Number: Dept: [phone]   Appointment Time:  Visit Start Time:    Check-In Time:  2:04 AM Visit Discharge Time:    Original-Treating Physician or Chiropractor    Page 2-Insurer/TPA    Page 3-Employer    Page 4-Employee

## 2018-09-18 NOTE — DISCHARGE INSTRUCTIONS
Chest Wall Pain  Introduction  Chest wall pain is pain in or around the bones and muscles of your chest. Sometimes, an injury causes this pain. Sometimes, the cause may not be known. This pain may take several weeks or longer to get better.  Follow these instructions at home:  Pay attention to any changes in your symptoms. Take these actions to help with your pain:  · Rest as told by your doctor.  · Avoid activities that cause pain. Try not to use your chest, belly (abdominal), or side muscles to lift heavy things.  · If directed, apply ice to the painful area:  ¨ Put ice in a plastic bag.  ¨ Place a towel between your skin and the bag.  ¨ Leave the ice on for 20 minutes, 2-3 times per day.  · Take over-the-counter and prescription medicines only as told by your doctor.  · Do not use tobacco products, including cigarettes, chewing tobacco, and e-cigarettes. If you need help quitting, ask your doctor.  · Keep all follow-up visits as told by your doctor. This is important.  Contact a doctor if:  · You have a fever.  · Your chest pain gets worse.  · You have new symptoms.  Get help right away if:  · You feel sick to your stomach (nauseous) or you throw up (vomit).  · You feel sweaty or light-headed.  · You have a cough with phlegm (sputum) or you cough up blood.  · You are short of breath.  This information is not intended to replace advice given to you by your health care provider. Make sure you discuss any questions you have with your health care provider.  Document Released: 06/05/2009 Document Revised: 05/25/2017 Document Reviewed: 03/14/2016  © 2017 Elsevier      Apply moist heat to the affected area 3-4 times daily  Rest as much as possible and follow-up with worker's comp within the next 2-3 days for recheck

## 2018-09-21 ENCOUNTER — DOCUMENTATION (OUTPATIENT)
Dept: OCCUPATIONAL MEDICINE | Facility: CLINIC | Age: 23
End: 2018-09-21

## 2018-11-05 ENCOUNTER — HOSPITAL ENCOUNTER (EMERGENCY)
Facility: MEDICAL CENTER | Age: 23
End: 2018-11-05
Attending: EMERGENCY MEDICINE
Payer: COMMERCIAL

## 2018-11-05 VITALS
RESPIRATION RATE: 16 BRPM | SYSTOLIC BLOOD PRESSURE: 120 MMHG | OXYGEN SATURATION: 99 % | BODY MASS INDEX: 29.17 KG/M2 | HEIGHT: 64 IN | DIASTOLIC BLOOD PRESSURE: 69 MMHG | TEMPERATURE: 97.6 F | HEART RATE: 68 BPM | WEIGHT: 170.86 LBS

## 2018-11-05 DIAGNOSIS — I95.1 ORTHOSTATIC SYNCOPE: ICD-10-CM

## 2018-11-05 DIAGNOSIS — E86.0 DEHYDRATION: ICD-10-CM

## 2018-11-05 LAB
ALBUMIN SERPL BCP-MCNC: 3.9 G/DL (ref 3.2–4.9)
ALBUMIN/GLOB SERPL: 1.3 G/DL
ALP SERPL-CCNC: 59 U/L (ref 30–99)
ALT SERPL-CCNC: 17 U/L (ref 2–50)
ANION GAP SERPL CALC-SCNC: 7 MMOL/L (ref 0–11.9)
AST SERPL-CCNC: 18 U/L (ref 12–45)
BASOPHILS # BLD AUTO: 0.7 % (ref 0–1.8)
BASOPHILS # BLD: 0.04 K/UL (ref 0–0.12)
BILIRUB SERPL-MCNC: 0.6 MG/DL (ref 0.1–1.5)
BUN SERPL-MCNC: 12 MG/DL (ref 8–22)
CALCIUM SERPL-MCNC: 9.6 MG/DL (ref 8.4–10.2)
CHLORIDE SERPL-SCNC: 104 MMOL/L (ref 96–112)
CO2 SERPL-SCNC: 26 MMOL/L (ref 20–33)
CREAT SERPL-MCNC: 0.79 MG/DL (ref 0.5–1.4)
EKG IMPRESSION: NORMAL
EOSINOPHIL # BLD AUTO: 0.2 K/UL (ref 0–0.51)
EOSINOPHIL NFR BLD: 3.3 % (ref 0–6.9)
ERYTHROCYTE [DISTWIDTH] IN BLOOD BY AUTOMATED COUNT: 42.2 FL (ref 35.9–50)
GLOBULIN SER CALC-MCNC: 3.1 G/DL (ref 1.9–3.5)
GLUCOSE SERPL-MCNC: 97 MG/DL (ref 65–99)
HCT VFR BLD AUTO: 40.4 % (ref 37–47)
HGB BLD-MCNC: 13.4 G/DL (ref 12–16)
IMM GRANULOCYTES # BLD AUTO: 0.02 K/UL (ref 0–0.11)
IMM GRANULOCYTES NFR BLD AUTO: 0.3 % (ref 0–0.9)
LIPASE SERPL-CCNC: 23 U/L (ref 7–58)
LYMPHOCYTES # BLD AUTO: 2.03 K/UL (ref 1–4.8)
LYMPHOCYTES NFR BLD: 33.3 % (ref 22–41)
MCH RBC QN AUTO: 30.5 PG (ref 27–33)
MCHC RBC AUTO-ENTMCNC: 33.2 G/DL (ref 33.6–35)
MCV RBC AUTO: 91.8 FL (ref 81.4–97.8)
MONOCYTES # BLD AUTO: 0.44 K/UL (ref 0–0.85)
MONOCYTES NFR BLD AUTO: 7.2 % (ref 0–13.4)
NEUTROPHILS # BLD AUTO: 3.36 K/UL (ref 2–7.15)
NEUTROPHILS NFR BLD: 55.2 % (ref 44–72)
NRBC # BLD AUTO: 0 K/UL
NRBC BLD-RTO: 0 /100 WBC
PLATELET # BLD AUTO: 245 K/UL (ref 164–446)
PMV BLD AUTO: 9.3 FL (ref 9–12.9)
POTASSIUM SERPL-SCNC: 3.4 MMOL/L (ref 3.6–5.5)
PROT SERPL-MCNC: 7 G/DL (ref 6–8.2)
RBC # BLD AUTO: 4.4 M/UL (ref 4.2–5.4)
SODIUM SERPL-SCNC: 137 MMOL/L (ref 135–145)
WBC # BLD AUTO: 6.1 K/UL (ref 4.8–10.8)

## 2018-11-05 PROCEDURE — 36415 COLL VENOUS BLD VENIPUNCTURE: CPT

## 2018-11-05 PROCEDURE — 700105 HCHG RX REV CODE 258: Performed by: EMERGENCY MEDICINE

## 2018-11-05 PROCEDURE — 83690 ASSAY OF LIPASE: CPT

## 2018-11-05 PROCEDURE — 700111 HCHG RX REV CODE 636 W/ 250 OVERRIDE (IP): Performed by: EMERGENCY MEDICINE

## 2018-11-05 PROCEDURE — 96374 THER/PROPH/DIAG INJ IV PUSH: CPT

## 2018-11-05 PROCEDURE — 80053 COMPREHEN METABOLIC PANEL: CPT

## 2018-11-05 PROCEDURE — 93005 ELECTROCARDIOGRAM TRACING: CPT | Performed by: EMERGENCY MEDICINE

## 2018-11-05 PROCEDURE — 85025 COMPLETE CBC W/AUTO DIFF WBC: CPT

## 2018-11-05 PROCEDURE — 99284 EMERGENCY DEPT VISIT MOD MDM: CPT

## 2018-11-05 PROCEDURE — 96375 TX/PRO/DX INJ NEW DRUG ADDON: CPT

## 2018-11-05 RX ORDER — SODIUM CHLORIDE, SODIUM LACTATE, POTASSIUM CHLORIDE, CALCIUM CHLORIDE 600; 310; 30; 20 MG/100ML; MG/100ML; MG/100ML; MG/100ML
INJECTION, SOLUTION INTRAVENOUS CONTINUOUS
Status: DISCONTINUED | OUTPATIENT
Start: 2018-11-05 | End: 2018-11-05 | Stop reason: HOSPADM

## 2018-11-05 RX ORDER — ONDANSETRON 4 MG/1
4 TABLET, ORALLY DISINTEGRATING ORAL EVERY 6 HOURS PRN
Qty: 20 TAB | Refills: 0 | Status: SHIPPED | OUTPATIENT
Start: 2018-11-05 | End: 2019-05-15

## 2018-11-05 RX ORDER — PENICILLIN V POTASSIUM 500 MG/1
500 TABLET ORAL DAILY
Status: SHIPPED | COMMUNITY
Start: 2018-10-20 | End: 2019-05-15

## 2018-11-05 RX ORDER — KETOROLAC TROMETHAMINE 30 MG/ML
15 INJECTION, SOLUTION INTRAMUSCULAR; INTRAVENOUS ONCE
Status: COMPLETED | OUTPATIENT
Start: 2018-11-05 | End: 2018-11-05

## 2018-11-05 RX ORDER — ONDANSETRON 2 MG/ML
4 INJECTION INTRAMUSCULAR; INTRAVENOUS ONCE
Status: COMPLETED | OUTPATIENT
Start: 2018-11-05 | End: 2018-11-05

## 2018-11-05 RX ADMIN — KETOROLAC TROMETHAMINE 15 MG: 30 INJECTION, SOLUTION INTRAMUSCULAR at 11:26

## 2018-11-05 RX ADMIN — SODIUM CHLORIDE, POTASSIUM CHLORIDE, SODIUM LACTATE AND CALCIUM CHLORIDE 1000 ML: 600; 310; 30; 20 INJECTION, SOLUTION INTRAVENOUS at 11:25

## 2018-11-05 RX ADMIN — ONDANSETRON 4 MG: 2 INJECTION INTRAMUSCULAR; INTRAVENOUS at 11:26

## 2018-11-05 ASSESSMENT — PAIN SCALES - GENERAL: PAINLEVEL_OUTOF10: 2

## 2018-11-05 NOTE — ED PROVIDER NOTES
ED Provider Note    ER PROVIDER NOTE        CHIEF COMPLAINT  Chief Complaint   Patient presents with   • Syncope     Pt c/o syncope this am while at work. Pt states she has not urinated x 2 days. Pt states she feels dehydrated.        HPI  Mary Carmen Richard is a 23 y.o. female who presents to the emergency department complaining of syncope.  Patient reports that her symptoms initially began on Friday when she had some nausea as well as diarrhea, over the weekend this progressed to vomiting with her diarrhea and states she has had difficulty holding fluids down and feels dehydrated.  This morning while at work she is feeling some general weakness and stood up from her desk and passed out.  There is no seizure activity and no trauma noted.  She denies any current or prodromal chest pain or shortness of breath.  She denies any abdominal pain.  No urinary symptoms although she states she has not really had to urinate in the past 2 days.  She has had some fever over the last 3 days as well.  Patient states her head feels achy, no neck pain, no rash    Patient states no chance of pregnancy and she does have IUD    REVIEW OF SYSTEMS  Pertinent positives include nausea vomiting, syncope. Pertinent negatives include no abdominal pain. See HPI for details. All other systems reviewed and are negative.    PAST MEDICAL HISTORY   has a past medical history of Anxiety; Asthma; and Depression.    SURGICAL HISTORY   has a past surgical history that includes tonsillectomy (2000); cervical cerclage (2/20/2015); other; primary c section (3/28/2015); and gastric sleeve laparoscopy (2/13/2018).    FAMILY HISTORY  Family History   Problem Relation Age of Onset   • No Known Problems Daughter    • No Known Problems Daughter    • Diabetes Father    • Diabetes Paternal Uncle    • Cancer Maternal Grandfather        SOCIAL HISTORY  Social History     Social History   • Marital status: Single     Spouse name: N/A   • Number of children:  "N/A   • Years of education: N/A     Social History Main Topics   • Smoking status: Current Every Day Smoker   • Smokeless tobacco: Never Used      Comment: vape    • Alcohol use No   • Drug use: No      Comment: vape   • Sexual activity: Not Currently     Partners: Male     Birth control/ protection: IUD     Other Topics Concern   • Not on file     Social History Narrative   • No narrative on file      History   Drug Use No     Comment: vape       CURRENT MEDICATIONS  Home Medications     Reviewed by Zion Leo (Pharmacy Tech) on 11/05/18 at 1102  Med List Status: Complete   Medication Last Dose Status   multivitamin (THERAGRAN) Tab 11/4/2018 Active   omeprazole (PRILOSEC) 20 MG delayed-release capsule 11/4/2018 Active   penicillin v potassium (VEETID) 500 MG Tab 10/29/2018 Active                ALLERGIES  No Known Allergies    PHYSICAL EXAM  VITAL SIGNS: /69   Pulse 68   Temp 36.4 °C (97.6 °F)   Resp 16   Ht 1.626 m (5' 4\")   Wt 77.5 kg (170 lb 13.7 oz)   SpO2 99%   BMI 29.33 kg/m²   Pulse ox interpretation: I interpret this pulse ox as normal.    Constitutional: Alert in no apparent distress.  HENT: No signs of trauma, Bilateral external ears normal, Nose normal.  Mucous membranes dry  Eyes: Pupils are equal and reactive, Conjunctiva normal, Non-icteric.   Neck: Normal range of motion, No tenderness, Supple, No stridor.   Lymphatic: No lymphadenopathy noted.   Cardiovascular: Regular rate and rhythm, no murmurs.   Thorax & Lungs: Normal breath sounds, No respiratory distress, No wheezing, No chest tenderness.   Abdomen: Bowel sounds normal, Soft, No tenderness, No masses, No pulsatile masses. No peritoneal signs.  Skin: Warm, Dry, No erythema, No rash.   Back: No bony tenderness, No CVA tenderness.   Extremities: Intact distal pulses, No edema, No tenderness, No cyanosis,Negative Freddie's sign.  Musculoskeletal: Good range of motion in all major joints. No tenderness to palpation or major " deformities noted.   Neurologic: Alert , Normal motor function, Normal sensory function, No focal deficits noted.   Psychiatric: Affect normal, Judgment normal, Mood normal.     DIAGNOSTIC STUDIES / PROCEDURES    Results for orders placed or performed during the hospital encounter of 11/05/18   CBC WITH DIFFERENTIAL   Result Value Ref Range    WBC 6.1 4.8 - 10.8 K/uL    RBC 4.40 4.20 - 5.40 M/uL    Hemoglobin 13.4 12.0 - 16.0 g/dL    Hematocrit 40.4 37.0 - 47.0 %    MCV 91.8 81.4 - 97.8 fL    MCH 30.5 27.0 - 33.0 pg    MCHC 33.2 (L) 33.6 - 35.0 g/dL    RDW 42.2 35.9 - 50.0 fL    Platelet Count 245 164 - 446 K/uL    MPV 9.3 9.0 - 12.9 fL    Neutrophils-Polys 55.20 44.00 - 72.00 %    Lymphocytes 33.30 22.00 - 41.00 %    Monocytes 7.20 0.00 - 13.40 %    Eosinophils 3.30 0.00 - 6.90 %    Basophils 0.70 0.00 - 1.80 %    Immature Granulocytes 0.30 0.00 - 0.90 %    Nucleated RBC 0.00 /100 WBC    Neutrophils (Absolute) 3.36 2.00 - 7.15 K/uL    Lymphs (Absolute) 2.03 1.00 - 4.80 K/uL    Monos (Absolute) 0.44 0.00 - 0.85 K/uL    Eos (Absolute) 0.20 0.00 - 0.51 K/uL    Baso (Absolute) 0.04 0.00 - 0.12 K/uL    Immature Granulocytes (abs) 0.02 0.00 - 0.11 K/uL    NRBC (Absolute) 0.00 K/uL   COMP METABOLIC PANEL   Result Value Ref Range    Sodium 137 135 - 145 mmol/L    Potassium 3.4 (L) 3.6 - 5.5 mmol/L    Chloride 104 96 - 112 mmol/L    Co2 26 20 - 33 mmol/L    Anion Gap 7.0 0.0 - 11.9    Glucose 97 65 - 99 mg/dL    Bun 12 8 - 22 mg/dL    Creatinine 0.79 0.50 - 1.40 mg/dL    Calcium 9.6 8.4 - 10.2 mg/dL    AST(SGOT) 18 12 - 45 U/L    ALT(SGPT) 17 2 - 50 U/L    Alkaline Phosphatase 59 30 - 99 U/L    Total Bilirubin 0.6 0.1 - 1.5 mg/dL    Albumin 3.9 3.2 - 4.9 g/dL    Total Protein 7.0 6.0 - 8.2 g/dL    Globulin 3.1 1.9 - 3.5 g/dL    A-G Ratio 1.3 g/dL   LIPASE   Result Value Ref Range    Lipase 23 7 - 58 U/L   ESTIMATED GFR   Result Value Ref Range    GFR If African American >60 >60 mL/min/1.73 m 2    GFR If Non African American  >60 >60 mL/min/1.73 m 2   EKG (NOW)   Result Value Ref Range    Report       [facility] Emergency Dept.    Test Date:  2018  Pt Name:    MARI HOLDER              Department: [facility]  MRN:        [id number]                      [facility]  Gender:     Female                       Technician: HRS  :        [id number]                   Requested By:VIVIANA PITTS  Order #:    [id number]                    Reading MD: VIVIANA PITTS MD    Measurements  Intervals                                Axis  Rate:       71                           P:          41  HI:         178                          QRS:        70  QRSD:       99                           T:          1  QT:         383  QTc:        417    Interpretive Statements  Sinus rhythm    Sinus bradycardia no longer present      Electronically Signed On 2018 12:22:56 PST by VIVIANA PITTS MD           RADIOLOGY  No orders to display     The radiologist's interpretation of all radiological studies have been reviewed by me.    COURSE & MEDICAL DECISION MAKING  Nursing notes, VS, PMSFHx reviewed in chart.    10:39 AM Patient seen and examined at bedside. Patient will be treated with Zofran, ketorolac, IV fluids. Ordered for labs, ECG to evaluate her symptoms.     HYDRATION: Based on the patient's presentation of Acute Vomiting and Dehydration the patient was given IV fluids. IV Hydration was used because oral hydration failed due to Patient's nausea and vomiting. Upon recheck following hydration, the patient was Improved.     12:12 PM patient reevaluated, she is still finishing her fluids, although states she is feeling improved at this time.  After fluids have completed we will ambulate    12:48 PM  Patient reevaluated, ambulated well without return of symptoms, states she is feeling improved, will plan for discharge      Decision Making:  This is a 23 y.o. female presenting after syncopal event.  She has had some  vomiting and diarrhea and clinically did appear dehydrated on exam.  She has improved with IV hydration and her nausea and vomiting have been controlled with Zofran.  On reevaluation she ambulates well without return of her symptoms.  I think her syncope is  likely from her hypovolemia.  No neurologic signs/symptoms were present to suggest a neurogenic cause such as CVA/TIA. There was no witnessed seizure activity or history or residual neuro deficit to suggest seizure. The patient has no valvular abnormality on my examination to suggest valvular cause or hypertrophic cardiomyopathy. The ekg does not show any evidence of arrythmia, prolonged intervals, early excitation, or other abnormality such as an accessory pathway, qt prolongation, or brugada syndrome. ACS or MI are unlikely causes given nature of sx, given the extent of injury that must occur to cause syncope, unremarkable ECG and given the patients improvement the likely of acs of mi is very low. Other cardiac causes are also unlikely based on my history and physical examination.  Serious bacterial illness was considered but ruled out by history and physical exam.   There is no evidence of metabolic abnormalities to explain this episode of syncope on labs.  Patient has a benign abdominal exam without suggestion of surgical pathology or obstruction at this time     The patient will return for new or worsening symptoms and is stable at the time of discharge and I will prescribe Zofran for continued symptomatic relief    The patient is referred to a primary physician for blood pressure management, diabetic screening, and for all other preventative health concerns.    DISPOSITION:  Patient will be discharged home in stable condition.    FOLLOW UP:  Kingston Naranjo A[facility]NSixto  [address] [address]  [phone]    In 1 week        OUTPATIENT MEDICATIONS:  Discharge Medication List as of 11/5/2018 12:52 PM      START taking these medications     Details   ondansetron (ZOFRAN ODT) 4 MG TABLET DISPERSIBLE Take 1 Tab by mouth every 6 hours as needed for Nausea., Disp-20 Tab, R-0, Print Rx Paper               FINAL IMPRESSION  1. Dehydration    2. Orthostatic syncope         The note accurately reflects work and decisions made by me.  Clayton Lakhani  11/5/2018  1:23 PM

## 2018-11-05 NOTE — ED TRIAGE NOTES
"Chief Complaint   Patient presents with   • Syncope     Pt c/o syncope this am while at work. Pt states she has not urinated x 2 days. Pt states she feels dehydrated.      /69   Pulse 89   Temp 36.6 °C (97.8 °F)   Resp 16   Ht 1.626 m (5' 4\")   Wt 77.5 kg (170 lb 13.7 oz)   SpO2 95%   BMI 29.33 kg/m²     "

## 2018-11-05 NOTE — ED NOTES
Med rec updated and complete  Allergies reviewed  Interviewed pt with brother at bedside with permission from pt.  Pt report that she picked up an RX for PENICILLIN 500MG once a day for 10 day course, but finished course about a week ago.  Called [facility] @ [phone], reports that pt had an RX for AZITHROMYCIN 250MG on 10/31/2018, but did not pick it up.  Let pt know that I called [facility] @ [phone], and they did not have her  an RX of PENICILLIN 500MG, asked if she went to another pharmacy, pt reports no I was taking PENICILLIN 500MG once a day, [facility].  Pt is under the last name Laura at the pharmacy.

## 2019-05-06 ENCOUNTER — OFFICE VISIT (OUTPATIENT)
Dept: URGENT CARE | Facility: PHYSICIAN GROUP | Age: 24
End: 2019-05-06
Payer: COMMERCIAL

## 2019-05-06 VITALS
SYSTOLIC BLOOD PRESSURE: 108 MMHG | OXYGEN SATURATION: 99 % | WEIGHT: 154 LBS | TEMPERATURE: 98.2 F | HEIGHT: 64 IN | DIASTOLIC BLOOD PRESSURE: 62 MMHG | RESPIRATION RATE: 18 BRPM | HEART RATE: 85 BPM | BODY MASS INDEX: 26.29 KG/M2

## 2019-05-06 DIAGNOSIS — R10.2 PELVIC PAIN: Primary | ICD-10-CM

## 2019-05-06 LAB

## 2019-05-06 PROCEDURE — 81025 URINE PREGNANCY TEST: CPT | Performed by: FAMILY MEDICINE

## 2019-05-06 PROCEDURE — 81002 URINALYSIS NONAUTO W/O SCOPE: CPT | Performed by: FAMILY MEDICINE

## 2019-05-06 PROCEDURE — 99214 OFFICE O/P EST MOD 30 MIN: CPT | Performed by: FAMILY MEDICINE

## 2019-05-06 RX ORDER — OXYCODONE HYDROCHLORIDE AND ACETAMINOPHEN 5; 325 MG/1; MG/1
1 TABLET ORAL EVERY 8 HOURS PRN
Qty: 10 TAB | Refills: 0 | Status: SHIPPED | OUTPATIENT
Start: 2019-05-06 | End: 2019-05-13

## 2019-05-07 NOTE — PROGRESS NOTES
Subjective:     Chief Complaint   Patient presents with   • Abdominal Pain     low abdominal [id number]                  Pelvic Pain  This is a new problem. The current episode started 2 wks ago.   The pain seems to coincide with her menstrual cycle.   Pain is typically present throughout the month, but most pronounced a couple days before her menses and for a couple of days afterwards.   This has actually been ongoing for the past 3 years.        The problem occurs constantly. The pain is unchanged. The pain is located in the lower pelvic region. The pain is moderate. The quality of the pain is described as aching. The pain does not radiate. . Pertinent negatives include no belching, constipation, diarrhea, dysuria, fever, hematochezia, hematuria, nausea or sore throat. Nothing relieves the symptoms. Past treatments include nothing.     Social History   Substance Use Topics   • Smoking status: Current Every Day Smoker   • Smokeless tobacco: Never Used      Comment: vape    • Alcohol use No           Current Outpatient Prescriptions on File Prior to Visit   Medication Sig Dispense Refill   • multivitamin (THERAGRAN) Tab Take 2 Tabs by mouth every day.     • penicillin v potassium (VEETID) 500 MG Tab Take 500 mg by mouth every day. Pt started on 10/20/2018 for 10 day course.     • ondansetron (ZOFRAN ODT) 4 MG TABLET DISPERSIBLE Take 1 Tab by mouth every 6 hours as needed for Nausea. (Patient not taking: Reported on 5/6/2019) 20 Tab 0   • omeprazole (PRILOSEC) 20 MG delayed-release capsule Take 20 mg by mouth every day.       No current facility-administered medications on file prior to visit.        Family History   Problem Relation Age of Onset   • No Known Problems Daughter    • No Known Problems Daughter    • Diabetes Father    • Diabetes Paternal Uncle    • Cancer Maternal Grandfather          No Known Allergies      Review of Systems   Constitutional: Negative for fever.   HENT: Negative for sore throat.   "  Gastrointestinal: Positive for abdominal pain. Negative for nausea, diarrhea, constipation and hematochezia.   Genitourinary: Negative for dysuria and hematuria.   Neurological: denies dizziness, confusion, disorientation.   No extremity weakness or numbness  All other systems reviewed and are negative.         Objective:     /62   Pulse 85   Temp 36.8 °C (98.2 °F) (Temporal)   Resp 18   Ht 1.626 m (5' 4\")   Wt 69.9 kg (154 lb)   SpO2 99%       Physical Exam   Constitutional: pt appears well-developed. No distress.   HENT:   Nose: No nasal discharge.   Mouth/Throat: Mucous membranes are moist. Oropharynx is clear.   Eyes: Conjunctivae and EOM are normal. Pupils are equal, round, and reactive to light. Right eye exhibits no discharge. Left eye exhibits no discharge.   Neck: Neck supple.   Cardiovascular: Normal rate, regular rhythm, S1 normal and S2 normal.    Pulmonary/Chest: Effort normal and breath sounds normal. There is normal air entry. No respiratory distress.   Abdominal: Soft. There is tenderness in the suprapubic area. bowel sounds are present.   No liver or spleen enlargement .  No rebound and no guarding.   There is no pain over McBurney's point  Lymphadenopathy:     Pt has no  adenopathy.   Neurological: pt is alert and orientated x3 . No cranial nerve deficit.   Skin: Skin is warm and moist. No petechiae and no rash noted.   not diaphoretic. No jaundice.   Nursing note and vitals reviewed.              Assessment/Plan:          1. Pelvic pain  Likely endometriosis  UA, Hcg unremarkable.   Advised f/u with ob/gyn    - oxyCODONE-acetaminophen (PERCOCET) 5-325 MG Tab; Take 1 Tab by mouth every 8 hours as needed for up to 7 days.  Dispense: 10 Tab; Refill: 0  - Consent for Opiate Prescription  - POCT Urinalysis  - POCT Pregnancy      Narcotic use report obtained with no indication of narcotic overuse or misuse and deemed necessary for treaatment. Sedation, dependence, and constipation " precautions given. Avoid use while driving or operating heavy machinery.

## 2019-05-15 ENCOUNTER — APPOINTMENT (OUTPATIENT)
Dept: RADIOLOGY | Facility: MEDICAL CENTER | Age: 24
End: 2019-05-15
Attending: EMERGENCY MEDICINE
Payer: COMMERCIAL

## 2019-05-15 ENCOUNTER — HOSPITAL ENCOUNTER (EMERGENCY)
Facility: MEDICAL CENTER | Age: 24
End: 2019-05-15
Attending: EMERGENCY MEDICINE
Payer: COMMERCIAL

## 2019-05-15 VITALS
OXYGEN SATURATION: 97 % | RESPIRATION RATE: 18 BRPM | TEMPERATURE: 97.5 F | SYSTOLIC BLOOD PRESSURE: 109 MMHG | WEIGHT: 159.61 LBS | BODY MASS INDEX: 27.25 KG/M2 | HEIGHT: 64 IN | DIASTOLIC BLOOD PRESSURE: 62 MMHG | HEART RATE: 66 BPM

## 2019-05-15 DIAGNOSIS — N93.8 DUB (DYSFUNCTIONAL UTERINE BLEEDING): ICD-10-CM

## 2019-05-15 LAB
APPEARANCE UR: CLEAR
B-HCG SERPL-ACNC: <0.6 MIU/ML (ref 0–10)
BILIRUB UR QL STRIP.AUTO: NEGATIVE
COLOR UR: YELLOW
GLUCOSE UR STRIP.AUTO-MCNC: NEGATIVE MG/DL
KETONES UR STRIP.AUTO-MCNC: NEGATIVE MG/DL
LEUKOCYTE ESTERASE UR QL STRIP.AUTO: NEGATIVE
MICRO URNS: NORMAL
NITRITE UR QL STRIP.AUTO: NEGATIVE
PH UR STRIP.AUTO: 7 [PH]
PROT UR QL STRIP: NEGATIVE MG/DL
RBC UR QL AUTO: NEGATIVE
SP GR UR STRIP.AUTO: 1.02

## 2019-05-15 PROCEDURE — 84702 CHORIONIC GONADOTROPIN TEST: CPT

## 2019-05-15 PROCEDURE — 700102 HCHG RX REV CODE 250 W/ 637 OVERRIDE(OP): Performed by: EMERGENCY MEDICINE

## 2019-05-15 PROCEDURE — 76856 US EXAM PELVIC COMPLETE: CPT

## 2019-05-15 PROCEDURE — 99284 EMERGENCY DEPT VISIT MOD MDM: CPT

## 2019-05-15 PROCEDURE — 81003 URINALYSIS AUTO W/O SCOPE: CPT

## 2019-05-15 PROCEDURE — 36415 COLL VENOUS BLD VENIPUNCTURE: CPT

## 2019-05-15 PROCEDURE — A9270 NON-COVERED ITEM OR SERVICE: HCPCS | Performed by: EMERGENCY MEDICINE

## 2019-05-15 RX ORDER — ACETAMINOPHEN 325 MG/1
650 TABLET ORAL ONCE
Status: COMPLETED | OUTPATIENT
Start: 2019-05-15 | End: 2019-05-15

## 2019-05-15 RX ORDER — MEDROXYPROGESTERONE ACETATE 10 MG/1
10 TABLET ORAL DAILY
Qty: 10 TAB | Refills: 0 | Status: SHIPPED | OUTPATIENT
Start: 2019-05-15 | End: 2019-05-25

## 2019-05-15 RX ORDER — HYDROCODONE BITARTRATE AND ACETAMINOPHEN 10; 325 MG/1; MG/1
1 TABLET ORAL ONCE
Status: COMPLETED | OUTPATIENT
Start: 2019-05-15 | End: 2019-05-15

## 2019-05-15 RX ORDER — OXYCODONE HYDROCHLORIDE AND ACETAMINOPHEN 5; 325 MG/1; MG/1
1 TABLET ORAL EVERY 4 HOURS PRN
Status: SHIPPED | COMMUNITY
End: 2019-09-21

## 2019-05-15 RX ADMIN — ACETAMINOPHEN 650 MG: 325 TABLET, FILM COATED ORAL at 11:19

## 2019-05-15 RX ADMIN — HYDROCODONE BITARTRATE AND ACETAMINOPHEN 1 TABLET: 10; 325 TABLET ORAL at 11:19

## 2019-05-15 NOTE — ED NOTES
Pt cleared for d/c  dischg instructions given to pt  Verbally understands  Rx provera given and pt is aware she needs to get filled and take as prescribed  D/c'ed to home in NAD and is to f/u w/ PCP as needed

## 2019-05-15 NOTE — ED NOTES
Med Rec updated and complete per pt at bedside  Allergies have been verified  No oral ABX within the last 30 days  [facility]

## 2019-05-15 NOTE — ED PROVIDER NOTES
ED Provider Note    CHIEF COMPLAINT  Chief Complaint   Patient presents with   • Abdominal Pain     Bilateral lower abdomen, was seen by Urgent Care 5/6 who dx her with possible endometriosis. Pt tried to see GYN today who stated they could not take her insurance so was sent to ED   • Vaginal Bleeding     For past 3 weeks.       HPI  Mary Carmen Richard is a 24 y.o. female who presents with a report that she is got some lower abdominal discomfort in total of 3 weeks of episodic vaginal bleeding about the same as irregular menses.  She states that she has not had any vomiting, fever, chills, sweats.  She states that her gynecologist could not see her because Dr. Fernández do not take her insurance anymore.    REVIEW OF SYSTEMS  See HPI for further details. All other systems are negative.     PAST MEDICAL HISTORY  Past Medical History:   Diagnosis Date   • Anxiety    • Asthma     history of asthma; childhood asthma   • Depression     history of       FAMILY HISTORY  Family History   Problem Relation Age of Onset   • No Known Problems Daughter    • No Known Problems Daughter    • Diabetes Father    • Diabetes Paternal Uncle    • Cancer Maternal Grandfather        SOCIAL HISTORY   reports that she has been smoking.  She has never used smokeless tobacco. She reports that she does not drink alcohol or use drugs.    SURGICAL HISTORY  Past Surgical History:   Procedure Laterality Date   • GASTRIC SLEEVE LAPAROSCOPY  2/13/2018    Procedure: GASTRIC SLEEVE LAPAROSCOPY;  Surgeon: Quintin Nguyen M.D.;  Location: SURGERY [facility];  Service: General   • PRIMARY C SECTION  3/28/2015    Performed by Lucy Valdez M.D. at LABOR AND DELIVERY   • CERVICAL CERCLAGE  2/20/2015    Performed by Quinn Grimaldo M.D. at LABOR AND DELIVERY   • TONSILLECTOMY  2000   • OTHER      wisdom teeth       CURRENT MEDICATIONS  Home Medications     Reviewed by Zion Taylor (Pharmacy Tech) on 05/15/19 at 1103  Med List Status: Complete  "  Medication Last Dose Status   oxyCODONE-acetaminophen (PERCOCET) 5-325 MG Tab 5/13/2019 Active                ALLERGIES  No Known Allergies    PHYSICAL EXAM  VITAL SIGNS: /71   Pulse 90   Temp 37.1 °C (98.8 °F) (Temporal)   Resp 16   Ht 1.626 m (5' 4\")   Wt 72.4 kg (159 lb 9.8 oz)   LMP 05/02/2019 (Exact Date)   SpO2 98%   BMI 27.40 kg/m²    Constitutional: Well developed, Well nourished, No acute distress, Non-toxic appearance.   HENT: Normocephalic, Atraumatic, Bilateral external ears normal, Oropharynx is clear mucous membranes are moist. No oral exudates or nasal discharge.   Eyes: Pupils are equal round and reactive, EOMI, Conjunctiva normal, No discharge.   Neck: Normal range of motion, No tenderness, Supple, No stridor. No meningismus.  Lymphatic: No lymphadenopathy noted.   Cardiovascular: Regular rate and rhythm without murmur rub or gallop.  Thorax & Lungs: Clear breath sounds bilaterally without wheezes, rhonchi or rales. There is no chest wall tenderness.   Abdomen: Soft non-tender non-distended. There is no rebound or guarding. No organomegaly is appreciated. Bowel sounds are normal.  Skin: Normal without rash.   Back: No CVA or spinal tenderness.   Extremities: Intact distal pulses, No edema, No tenderness, No cyanosis, No clubbing. Capillary refill is less than 2 seconds.  Musculoskeletal: Good range of motion in all major joints. No tenderness to palpation or major deformities noted.   Neurologic: Alert & oriented x 3, Normal motor function, Normal sensory function, No focal deficits noted. Reflexes are normal.  Psychiatric: Affect normal, Judgment normal, Mood normal. There is no suicidal ideation or patient reported hallucinations.       RADIOLOGY/PROCEDURES  US-PELVIC COMPLETE (TRANSABDOMINAL/TRANSVAGINAL) (COMBO)   Final Result      1.  Unremarkable transvaginal appearance of the pelvis.      2.  Intrauterine device appears appropriately positioned.            COURSE & MEDICAL " DECISION MAKING  Pertinent Labs & Imaging studies reviewed. (See chart for details)  Patient presents with dysfunctional uterine bleeding and unremarkable examination.  I ordered ultrasound which shows normal transvaginal appearance of the pelvis with no evidence of fluid in the cul-de-sac, uterine or ovarian abnormalities.  There is an IUD that is well-positioned    The patient will likely need additional testing including pituitary ovarian uterine axis testing.  She will need follow-up with gynecology but states that Dr. Fernández does not take her insurance anymore.  We do not have gynecology on call at this facility today.  I have asked her to obtain referral from Dr. Naranjo, her primary care provider or Dr. Fernández, her former OB/GYN    FINAL IMPRESSION  1. DUB (dysfunctional uterine bleeding)             Electronically signed by: Sathya Xavier, 5/15/2019 1:17 PM

## 2019-09-21 ENCOUNTER — HOSPITAL ENCOUNTER (EMERGENCY)
Facility: MEDICAL CENTER | Age: 24
End: 2019-09-21
Attending: EMERGENCY MEDICINE
Payer: MEDICAID

## 2019-09-21 VITALS
BODY MASS INDEX: 26.68 KG/M2 | SYSTOLIC BLOOD PRESSURE: 109 MMHG | HEART RATE: 72 BPM | WEIGHT: 155.42 LBS | RESPIRATION RATE: 18 BRPM | OXYGEN SATURATION: 98 % | TEMPERATURE: 97.9 F | DIASTOLIC BLOOD PRESSURE: 56 MMHG

## 2019-09-21 DIAGNOSIS — R05.9 COUGH: ICD-10-CM

## 2019-09-21 DIAGNOSIS — R06.2 WHEEZING: ICD-10-CM

## 2019-09-21 PROCEDURE — 700101 HCHG RX REV CODE 250: Performed by: EMERGENCY MEDICINE

## 2019-09-21 PROCEDURE — 99285 EMERGENCY DEPT VISIT HI MDM: CPT

## 2019-09-21 PROCEDURE — A9270 NON-COVERED ITEM OR SERVICE: HCPCS | Performed by: EMERGENCY MEDICINE

## 2019-09-21 PROCEDURE — 700102 HCHG RX REV CODE 250 W/ 637 OVERRIDE(OP): Performed by: EMERGENCY MEDICINE

## 2019-09-21 PROCEDURE — 94640 AIRWAY INHALATION TREATMENT: CPT

## 2019-09-21 PROCEDURE — 94760 N-INVAS EAR/PLS OXIMETRY 1: CPT

## 2019-09-21 RX ORDER — PROMETHAZINE HYDROCHLORIDE AND CODEINE PHOSPHATE 6.25; 1 MG/5ML; MG/5ML
5-10 SYRUP ORAL 4 TIMES DAILY PRN
Qty: 120 ML | Refills: 0 | Status: SHIPPED | OUTPATIENT
Start: 2019-09-21 | End: 2019-09-24

## 2019-09-21 RX ORDER — AZITHROMYCIN 250 MG/1
500 TABLET, FILM COATED ORAL ONCE
Status: COMPLETED | OUTPATIENT
Start: 2019-09-21 | End: 2019-09-21

## 2019-09-21 RX ORDER — AZITHROMYCIN 250 MG/1
250 TABLET, FILM COATED ORAL DAILY
Qty: 4 TAB | Refills: 0 | Status: SHIPPED | OUTPATIENT
Start: 2019-09-21 | End: 2019-09-25

## 2019-09-21 RX ORDER — ALBUTEROL SULFATE 90 UG/1
2 AEROSOL, METERED RESPIRATORY (INHALATION) EVERY 4 HOURS PRN
Qty: 1 INHALER | Refills: 1 | Status: SHIPPED | OUTPATIENT
Start: 2019-09-21 | End: 2022-10-09

## 2019-09-21 RX ORDER — ACETAMINOPHEN 325 MG/1
650 TABLET ORAL EVERY 4 HOURS PRN
Status: SHIPPED | COMMUNITY
End: 2019-11-21

## 2019-09-21 RX ORDER — AZITHROMYCIN 250 MG/1
TABLET, FILM COATED ORAL
Status: COMPLETED
Start: 2019-09-21 | End: 2019-09-21

## 2019-09-21 RX ORDER — PREDNISONE 20 MG/1
40 TABLET ORAL DAILY
Qty: 8 TAB | Refills: 0 | Status: SHIPPED | OUTPATIENT
Start: 2019-09-21 | End: 2019-09-25

## 2019-09-21 RX ORDER — DEXAMETHASONE 4 MG/1
12 TABLET ORAL ONCE
Status: COMPLETED | OUTPATIENT
Start: 2019-09-21 | End: 2019-09-21

## 2019-09-21 RX ORDER — COPPER 313.4 MG/1
1 INTRAUTERINE DEVICE INTRAUTERINE SEE ADMIN INSTRUCTIONS
COMMUNITY

## 2019-09-21 RX ADMIN — DEXAMETHASONE 12 MG: 4 TABLET ORAL at 10:33

## 2019-09-21 RX ADMIN — IPRATROPIUM BROMIDE 0.5 MG: 0.5 SOLUTION RESPIRATORY (INHALATION) at 10:39

## 2019-09-21 RX ADMIN — ALBUTEROL SULFATE 2.5 MG: 2.5 SOLUTION RESPIRATORY (INHALATION) at 10:39

## 2019-09-21 RX ADMIN — AZITHROMYCIN 500 MG: 250 TABLET, FILM COATED ORAL at 10:32

## 2019-09-21 NOTE — FLOWSHEET NOTE
09/21/19 1039   Events/Summary/Plan   Events/Summary/Plan Tx given in ER   Interdisciplinary Plan of Care-Goals (Indications)   Bronchodilator Indications Strong Subjective / Objective Improvement   Interdisciplinary Plan of Care-Outcomes    Bronchodilator Outcome Patient at Stable Baseline   Education   Education Yes - Pt. / Family has been Instructed in use of Respiratory Medications and Adverse Reactions   RT Assessment of Delivered Medications   Evaluation of Medication Delivery Daily Yes-- Pt /Family has been Instructed in use of Respiratory Medications and Adverse Reactions   SVN Group   #SVN Performed Yes   Given By: Mouthpiece   Date SVN Last Changed 09/21/19   Date SVN Next Change Due (Q 7 Days) 09/28/19   Chest Exam   Pulse 86   Breath Sounds   Pre/Post Intervention Post Intervention Assessment   RUL Breath Sounds Clear   RML Breath Sounds Clear   RLL Breath Sounds Diminished   MARVA Breath Sounds Clear   LLL Breath Sounds Diminished   Oximetry   #Pulse Oximetry (Single Determination) Yes   Oxygen   Pulse Oximetry 99 %   O2 (LPM) 0   O2 Daily Delivery Respiratory  Room Air with O2 Available

## 2019-09-21 NOTE — ED NOTES
Pt given written and oral discharge instructions. Pt verbalized understanding of all instructions given. All questions answered. VSS. Pt given paper prescription as ordered and educated on use. Pt given f/u instructions and educated on s/s of when to return to the ER. Pt ambulating independently upon time of dc in stable condition. Pt signed controlled substance informed consent form.

## 2019-09-21 NOTE — ED PROVIDER NOTES
ED Provider Note    CHIEF COMPLAINT  Chief Complaint   Patient presents with   • Cough   • Fever   • Abdominal Pain       HPI  Mary Carmen Cristina Richard is a 24 y.o. female who presents chief complaint of cough.  The cough is been for 5 days is really been really bad at night she been able get sleep associate with fever.  Nonproductive.  Some wheezing.  Some diffuse chest pain that is pleuritic no nausea no vomiting.  She does have abdominal pain in the lower abdominal area this is typical for her when she gets her.  She thinks she may have endometriosis which may have been clinically diagnosed by urgent care she states that the pain is suprapubic sharp stabbing typical her.  Starts in a few days.  No dysuria no urgency no frequency    She had asthma last time she is inhaler was when she is about 16 years old she states that this feels similar to that.  No history of allergies sinus pressure and congestion noted    REVIEW OF SYSTEMS  General: See above  Eyes: No eye discharge. No eye pain.  Ear nose throat: Only sore throat with coughing sinus pressure.  Pulmonary: See above  Cardiovascular: See above  GI: No abdominal pain nausea or vomiting.  : No dysuria or hematuria  Dermatologic: No rashes. No abrasions.  Neurologic: No weakness or numbness.      All other systems are negative      PAST MEDICAL HISTORY  Past Medical History:   Diagnosis Date   • Anxiety    • Asthma     history of asthma; childhood asthma   • Depression     history of   • Ovarian cyst        FAMILY HISTORY  Family History   Problem Relation Age of Onset   • No Known Problems Daughter    • No Known Problems Daughter    • Diabetes Father    • Diabetes Paternal Uncle    • Cancer Maternal Grandfather        SOCIAL HISTORY  Social History     Socioeconomic History   • Marital status:      Spouse name: Not on file   • Number of children: Not on file   • Years of education: Not on file   • Highest education level: Not on file   Occupational  History   • Not on file   Social Needs   • Financial resource strain: Not on file   • Food insecurity:     Worry: Not on file     Inability: Not on file   • Transportation needs:     Medical: Not on file     Non-medical: Not on file   Tobacco Use   • Smoking status: Never Smoker   • Smokeless tobacco: Never Used   • Tobacco comment: vape    Substance and Sexual Activity   • Alcohol use: No   • Drug use: No     Comment: vape   • Sexual activity: Not Currently     Partners: Male     Birth control/protection: IUD   Lifestyle   • Physical activity:     Days per week: Not on file     Minutes per session: Not on file   • Stress: Not on file   Relationships   • Social connections:     Talks on phone: Not on file     Gets together: Not on file     Attends Taoism service: Not on file     Active member of club or organization: Not on file     Attends meetings of clubs or organizations: Not on file     Relationship status: Not on file   • Intimate partner violence:     Fear of current or ex partner: Not on file     Emotionally abused: Not on file     Physically abused: Not on file     Forced sexual activity: Not on file   Other Topics Concern   • Not on file   Social History Narrative   • Not on file       SURGICAL HISTORY  Past Surgical History:   Procedure Laterality Date   • GASTRIC SLEEVE LAPAROSCOPY  2/13/2018    Procedure: GASTRIC SLEEVE LAPAROSCOPY;  Surgeon: Quintin Nguyen M.D.;  Location: SURGERY [facility];  Service: General   • PRIMARY C SECTION  3/28/2015    Performed by Lucy Valdez M.D. at LABOR AND DELIVERY   • CERVICAL CERCLAGE  2/20/2015    Performed by Quinn Grimaldo M.D. at LABOR AND DELIVERY   • TONSILLECTOMY  2000   • OTHER      wisdom teeth       CURRENT MEDICATIONS  Home Medications     Reviewed by Zion Cotton (Pharmacy Tech) on 09/21/19 at 1004  Med List Status: Complete   Medication Last Dose Status   acetaminophen (TYLENOL) 325 MG Tab 9/21/2019 Active   PARAGARD INTRAUTERINE COPPER  IUD APRIL 2015 Active                ALLERGIES  No Known Allergies    PHYSICAL EXAM  VITAL SIGNS: /62   Pulse 100   Temp 37.1 °C (98.8 °F) (Temporal)   Resp 18   Wt 70.5 kg (155 lb 6.8 oz)   SpO2 100%   BMI 26.68 kg/m²    Constitutional: Well developed, Well nourished, non-acute distress,   HENT: Normocephalic, Atraumatic, Oropharynx moist, No oral exudates, Nose normal.  Minimal tenderness over the maxillary sinuses there are no tonsils noted no exudate no erythema.  Eyes: PERRLA, EOMI, Conjunctiva normal, No discharge.   Musculoskeletal: Neck normal range of motion, No tenderness, Supple,  Cardiovascular: The rhythm rate of 80 no murmurs  Thorax & Lungs: Questionable rhonchi in the right lower lobe with some diffuse air sounds and some scant wheezes.  Abdomen: Bowel sounds normal, Soft, No tenderness, No masses, No pulsatile masses.   Skin: Warm, Dry, No erythema, No rash.   : No CVA tenderness.   Neurologic: Alert & oriented , moves all extremities equally  Psychiatric:  Calm, not anxious      RADIOLOGY/PROCEDURES  She received albuterol and Atrovent    COURSE & MEDICAL DECISION MAKING  Pertinent Labs & Imaging studies reviewed. (See chart for details)  Wheezing suspect reactive airway disease.  There is possible pneumonia in the right lower lobe otherwise looks well nontoxic and otherwise really care for cough is unable to sleep and I will going to albuterol Atrovent treatment will give her Zithromax for the suspected clinical pneumonia and steroids.  Patient this point in stable condition    11:00 AM  The patient looks otherwise well nontoxic repeat exam better air movement slight crackles in the right lower lobe and still no wheezes at this point she responded albuterol Atrovent got albuterol treatments steroids for 3 days antibiotics and I will see return she is not feeling better in the next 2 days as to come back anytime she is worse anticipatory guidance was given    She understands that  anything with codeine and narcotics are potentially addicting.  Told not to drink and drive while taking the medication.  Obviously just sparingly use it at night..    FINAL IMPRESSION  1.  Reactive airway disease  2.  Suspect the clinical pneumonia  3.      Electronically signed by: Quinn Castillo, 9/21/2019 10:17 AM

## 2019-11-21 ENCOUNTER — HOSPITAL ENCOUNTER (EMERGENCY)
Facility: MEDICAL CENTER | Age: 24
End: 2019-11-21
Attending: EMERGENCY MEDICINE
Payer: COMMERCIAL

## 2019-11-21 ENCOUNTER — APPOINTMENT (OUTPATIENT)
Dept: RADIOLOGY | Facility: MEDICAL CENTER | Age: 24
End: 2019-11-21
Attending: EMERGENCY MEDICINE
Payer: COMMERCIAL

## 2019-11-21 VITALS
DIASTOLIC BLOOD PRESSURE: 49 MMHG | HEIGHT: 64 IN | SYSTOLIC BLOOD PRESSURE: 131 MMHG | HEART RATE: 93 BPM | OXYGEN SATURATION: 97 % | BODY MASS INDEX: 25.59 KG/M2 | TEMPERATURE: 98.6 F | RESPIRATION RATE: 20 BRPM | WEIGHT: 149.91 LBS

## 2019-11-21 DIAGNOSIS — J06.9 VIRAL URI WITH COUGH: ICD-10-CM

## 2019-11-21 PROCEDURE — 99283 EMERGENCY DEPT VISIT LOW MDM: CPT | Mod: 25

## 2019-11-21 PROCEDURE — 71045 X-RAY EXAM CHEST 1 VIEW: CPT

## 2019-11-21 RX ORDER — GUAIFENESIN AND DEXTROMETHORPHAN HYDROBROMIDE 100; 10 MG/5ML; MG/5ML
10 SOLUTION ORAL EVERY 6 HOURS PRN
Qty: 200 ML | Refills: 0 | Status: SHIPPED | OUTPATIENT
Start: 2019-11-21 | End: 2022-10-09

## 2019-11-21 RX ORDER — AMOXICILLIN 500 MG/1
500 CAPSULE ORAL 3 TIMES DAILY
Status: SHIPPED | COMMUNITY
Start: 2019-11-09 | End: 2022-10-09

## 2019-11-21 ASSESSMENT — LIFESTYLE VARIABLES
HAVE YOU EVER FELT YOU SHOULD CUT DOWN ON YOUR DRINKING: NO
TOTAL SCORE: 0
DO YOU DRINK ALCOHOL: NO
HOW MANY TIMES IN THE PAST YEAR HAVE YOU HAD 5 OR MORE DRINKS IN A DAY: 0
CONSUMPTION TOTAL: NEGATIVE
HAVE PEOPLE ANNOYED YOU BY CRITICIZING YOUR DRINKING: NO
EVER HAD A DRINK FIRST THING IN THE MORNING TO STEADY YOUR NERVES TO GET RID OF A HANGOVER: NO
AVERAGE NUMBER OF DAYS PER WEEK YOU HAVE A DRINK CONTAINING ALCOHOL: 0
EVER FELT BAD OR GUILTY ABOUT YOUR DRINKING: NO
TOTAL SCORE: 0
ON A TYPICAL DAY WHEN YOU DRINK ALCOHOL HOW MANY DRINKS DO YOU HAVE: 0
DOES PATIENT WANT TO STOP DRINKING: NO
TOTAL SCORE: 0

## 2019-11-21 ASSESSMENT — PAIN SCALES - WONG BAKER: WONGBAKER_NUMERICALRESPONSE: HURTS A LITTLE MORE

## 2019-11-21 NOTE — ED NOTES
Med Rec completed per patient   Allergies reviewed    Patient completed a 10 day Amoxicillin course 11-19-19

## 2019-11-21 NOTE — ED TRIAGE NOTES
"Chief Complaint   Patient presents with   • Cough     started 2 days   • Sore Throat     started 2 days. pt was tested positive for strep throat 2 weeks ago and was on antibiotics. pt still feels sick. son tested positive for strep. hx of asthma     /49   Pulse 93   Temp 37 °C (98.6 °F) (Temporal)   Resp 20   Ht 1.626 m (5' 4\")   Wt 68 kg (149 lb 14.6 oz)   SpO2 97%   BMI 25.73 kg/m²     "

## 2019-11-21 NOTE — ED PROVIDER NOTES
ED Provider Note    CHIEF COMPLAINT  Chief Complaint   Patient presents with   • Cough     started 2 days   • Sore Throat     started 2 days. pt was tested positive for strep throat 2 weeks ago and was on antibiotics. pt still feels sick. son tested positive for strep. hx of asthma       HPI  Anastasiiakarine Cristina Richard is a 24 y.o. female who presents with cough.  She states that she has been sick for 2 or 3 weeks now with chronic cough and sore throat.  Has multiple sick contacts including son at bedside who was recently diagnosed with strep throat.  She herself was tested for strep throat and was positive a few weeks ago and completed 10-day course of amoxicillin.  Last dose was about 2 days ago.  Has a family member also here in the emergency department presenting with URI-like symptoms that started 3 days ago.  No hemoptysis.  No vomiting.  No abdominal pain.  No active fevers.    REVIEW OF SYSTEMS  See HPI for further details. All other systems are negative.     PAST MEDICAL HISTORY   has a past medical history of Anxiety, Asthma, Depression, and Ovarian cyst.    SOCIAL HISTORY  Social History     Tobacco Use   • Smoking status: Never Smoker   • Smokeless tobacco: Never Used   • Tobacco comment: vape    Substance and Sexual Activity   • Alcohol use: No   • Drug use: No     Comment: vape   • Sexual activity: Not Currently     Partners: Male     Birth control/protection: I.U.D.       SURGICAL HISTORY   has a past surgical history that includes tonsillectomy (2000); cervical cerclage (2/20/2015); other; primary c section (3/28/2015); and gastric sleeve laparoscopy (2/13/2018).    CURRENT MEDICATIONS  Home Medications     Reviewed by Zion Mohr (Pharmacy Tech) on 11/21/19 at 0938  Med List Status: Complete   Medication Last Dose Status   albuterol 108 (90 Base) MCG/ACT Aero Soln inhalation aerosol 11/21/2019 Active   amoxicillin (AMOXIL) 500 MG Cap 11/19/2019 Active   PARAGARD INTRAUTERINE COPPER IUD CONT  "Active                ALLERGIES  No Known Allergies    PHYSICAL EXAM  VITAL SIGNS: /49   Pulse 93   Temp 37 °C (98.6 °F) (Temporal)   Resp 20   Ht 1.626 m (5' 4\")   Wt 68 kg (149 lb 14.6 oz)   SpO2 97%   BMI 25.73 kg/m²   Pulse ox interpretation: I interpret this pulse ox as normal.  Constitutional: Alert in no apparent distress.  HENT: No signs of trauma, Bilateral external ears normal, Nose normal.  Posterior pharynx unremarkable.  No tonsillar swelling or exudates.  Eyes: Pupils are equal and reactive, Conjunctiva normal, Non-icteric.   Neck: Normal range of motion, No tenderness, Supple, No stridor.   Lymphatic: No lymphadenopathy noted.   Cardiovascular: Regular rate and rhythm.   Thorax & Lungs: Normal breath sounds, No respiratory distress, No wheezing, No chest tenderness.   Abdomen: Bowel sounds normal, Soft, No tenderness, No masses, No pulsatile masses. No peritoneal signs.  Skin: Warm, Dry, No erythema, No rash.   Extremities: Intact distal pulses, No edema, No tenderness, No cyanosis  Neurologic: Alert, No focal deficits noted.       DIAGNOSTIC STUDIES / PROCEDURES      RADIOLOGY  DX-CHEST-PORTABLE (1 VIEW)   Final Result      No acute cardiopulmonary findings.          COURSE & MEDICAL DECISION MAKING    Medications - No data to display    Pertinent Labs & Imaging studies reviewed. (See chart for details)  24 y.o. female presenting with cough for about 3 weeks now.  Has had a series of what appears to be upper respiratory infections and a recent diagnosis of strep throat for which she just completed 10 days of antibiotics.  Due to her continued cough and malaise, x-ray was performed.  No obvious signs of pneumonia/pulmonary infiltrate.  Clear breath sounds bilaterally.  No hypoxia.  No signs of respiratory distress on physical examination.    Patient is hemodynamically stable here.  No signs of sepsis.  No evidence of serious bacterial illness at this time.  Suspect that the patient is " "suffering from an upper respiratory tract infection of a viral etiology.  Likely similar in nature to family members at bedside.  Recommending supportive care measures at home including adequate oral hydration and acetaminophen and/or ibuprofen as needed for fever or myalgia.  For symptom medic management she was also provided with dextromethorphan- guaifenesin for antitussive treatment.  She is discharged home in stable condition.    The patient will not drink alcohol nor drive with prescribed medications.   The patient was instructed to follow-up with primary care physician for further management.  To return immediately for any worsening symptoms or development of any other concerning signs or symptoms. The patient verbalizes understanding in their own words.    /49   Pulse 93   Temp 37 °C (98.6 °F) (Temporal)   Resp 20   Ht 1.626 m (5' 4\")   Wt 68 kg (149 lb 14.6 oz)   SpO2 97%   BMI 25.73 kg/m²     The patient was referred to primary care where they will receive further BP management.      Kingston Naranjo, A.P.R.N.  [address]  [address]  [facility] [id number] [phone]    Schedule an appointment as soon as possible for a visit       [facility], Emergency Dept  [address]  [facility] [address] [phone]    As needed, If symptoms worsen      FINAL IMPRESSION  1. Viral URI with cough            Electronically signed by: Carlos Head, 11/21/2019 8:45 AM    "

## 2020-06-25 NOTE — PROGRESS NOTES
Ankur Alarcon presents to the clinic today for management of his anticoagulation therapy in treatment of his atrial fibrillation. Target INR is 2.0-3.0. His INR today was supratherapeutic at 3.1 on 22.5 mg of Warfarin weekly. His previous INR was 2.7 on 5/14/20. The patient denies medication changes since the last visit. He  denies diet changes since the last visit. He was not able to ambulate to office without assistive device, he used a walking stick. Ankur will take 1.5mg this evening instead of 3mg, which is a 6.7% decrease, then resume his previous stable therapeutic dose of 22.5mg weekly, and return to the clinic in another 6 weeks on Thursday 8/6/20 for INR fingerstick. Onsite billing provider is ALKA Wei.   Due to INR today being above range. Cause unknown. Patient does deny bruises or bleeding problems. Safety precautions reviewed.       Assumed care of pt at 1900, report given.  A+O x 4, VSS, and RA.   Pt has 5 lap sites covered with dry gauze and tegaderm. Small, old, serosanguinous drainage noted on a couple of the sites; but no reinforcement needed at this time.   Pt reports mild nausea; medicated per MAR with Zofran and Benadryl; tolerating well.   Pt using Dilaudid PCA for pain management, tolerating well.   Pt ambulates self up to restroom with a standby assist.   Pt updated on POC and all questions answered at this time.  Bed in lowest position, call light within reach, and no current needs.

## 2020-11-03 ENCOUNTER — GYNECOLOGY VISIT (OUTPATIENT)
Dept: OBGYN | Facility: CLINIC | Age: 25
End: 2020-11-03
Payer: COMMERCIAL

## 2020-11-03 VITALS — WEIGHT: 150 LBS | SYSTOLIC BLOOD PRESSURE: 92 MMHG | DIASTOLIC BLOOD PRESSURE: 58 MMHG | BODY MASS INDEX: 25.75 KG/M2

## 2020-11-03 DIAGNOSIS — Z30.430 ENCOUNTER FOR IUD INSERTION: ICD-10-CM

## 2020-11-03 DIAGNOSIS — Z30.014 ENCOUNTER FOR INITIAL PRESCRIPTION OF INTRAUTERINE CONTRACEPTIVE DEVICE (IUD): ICD-10-CM

## 2020-11-03 DIAGNOSIS — N92.0 MENORRHAGIA WITH REGULAR CYCLE: ICD-10-CM

## 2020-11-03 DIAGNOSIS — N94.6 DYSMENORRHEA: ICD-10-CM

## 2020-11-03 LAB
INT CON NEG: NEGATIVE
INT CON POS: POSITIVE
POC URINE PREGNANCY TEST: NEGATIVE

## 2020-11-03 PROCEDURE — 81025 URINE PREGNANCY TEST: CPT | Performed by: OBSTETRICS & GYNECOLOGY

## 2020-11-03 PROCEDURE — 58300 INSERT INTRAUTERINE DEVICE: CPT | Performed by: OBSTETRICS & GYNECOLOGY

## 2020-11-03 PROCEDURE — 99203 OFFICE O/P NEW LOW 30 MIN: CPT | Mod: 25 | Performed by: OBSTETRICS & GYNECOLOGY

## 2020-11-03 ASSESSMENT — FIBROSIS 4 INDEX: FIB4 SCORE: 0.43

## 2020-11-03 NOTE — PROGRESS NOTES
Subjective:      Mary Carmen Richard is a 25 y.o. female who presents as New Patient (new patient )            HPI patient is a 25-year-old  1 para 0-1-0-2 who presents today with menorrhagia and dysmenorrhea to discuss treatment options.  Patient states she was interested in ablation or hysterectomy.  Patient has had 1 pregnancy previously resulted in twin gestation in pregnancy was complicated by incompetent cervix for which she had a cervical cerclage and delivered at 24 weeks gestation via  .  Patient states due to pregnancy complication she desires no future pregnancies.  After her pregnancy she used a Mirena IUD for 5 years and was very satisfied with her IUD.  States IUD was removed 2-1/2 years ago and since that time she has been having heavy menstrual bleeding with significant dysmenorrhea symptoms.  She reports normal bowel and bladder functions.  Patient is sexually active and does not use any contraception.  No history of PID STDs or abnormal Pap smears.  Patient has had history of gastric sleeve    ROS all organ systems were reviewed and were negative except for complaints in HPI       Objective:     BP (!) 92/58   Wt 68 kg (150 lb)   LMP 10/25/2020   Breastfeeding No   BMI 25.75 kg/m²      Physical Exam  Vitals signs and nursing note reviewed. Exam conducted with a chaperone present.   Constitutional:       General: She is not in acute distress.     Appearance: Normal appearance. She is normal weight. She is not toxic-appearing.   HENT:      Head: Normocephalic and atraumatic.   Eyes:      General:         Right eye: No discharge.         Left eye: No discharge.      Conjunctiva/sclera: Conjunctivae normal.   Neck:      Musculoskeletal: Normal range of motion and neck supple. No neck rigidity.   Cardiovascular:      Rate and Rhythm: Normal rate and regular rhythm.      Pulses: Normal pulses.      Heart sounds: Normal heart sounds. No murmur.   Pulmonary:      Effort: Pulmonary  effort is normal. No respiratory distress.      Breath sounds: Normal breath sounds. No wheezing.   Abdominal:      General: Abdomen is flat. Bowel sounds are normal. There is no distension.      Palpations: Abdomen is soft.      Tenderness: There is no abdominal tenderness.   Genitourinary:     General: Normal vulva.      Labia:         Right: No rash, tenderness, lesion or injury.         Left: No rash, lesion or injury.       Vagina: No vaginal discharge, tenderness or bleeding.      Cervix: No cervical motion tenderness, discharge, friability or erythema.      Uterus: Not enlarged and not tender.       Adnexa:         Right: No mass, tenderness or fullness.          Left: No mass, tenderness or fullness.        Rectum: No external hemorrhoid.   Musculoskeletal: Normal range of motion.      Right lower leg: No edema.      Left lower leg: No edema.   Lymphadenopathy:      Cervical: No cervical adenopathy.      Lower Body: No right inguinal adenopathy. No left inguinal adenopathy.   Skin:     General: Skin is warm and dry.      Findings: No lesion.   Neurological:      General: No focal deficit present.      Mental Status: She is alert and oriented to person, place, and time.      Gait: Gait normal.   Psychiatric:         Mood and Affect: Mood normal.         Behavior: Behavior normal.         Thought Content: Thought content normal.         Judgment: Judgment normal.            Discussion:  We discussed contraception and contraception options available.  We discussed heavy menstrual bleeding and treatment options.  We discussed option for endometrial ablation but due to her previous  and current available methods of ablation, I discussed with patient that I do not think it safe for her to have 1 of these ablation procedures due to risk for thermal injury.  I discussed that hysterectomy is her choice but due to her age I will not recommend hysterectomy at this time.  I discussed contraception options  available and patient states she would like to try a Mirena IUD again.  I discussed Mirena IUD and discussed risks benefits possible complications.  I discussed potential bleeding changes and failure rates.  After discussion patient states she would like to have IUD placed today.  Informed consents were obtained.        Procedure:  25 y.o.    Female presents here today for her IUD insertion:     Patient's last menstrual period was 10/25/2020.      Today the patient is counseled on the risks of IUD insertion. I also discussed with the patient the risk of infection on insertion, and had asked the patient to remain on pelvic rest for one week following the insertion. We also discussed the risk of IUD expulsion, the risk of uterine perforation and IUD migration. If the IUD does migrate the patient may require a separate procedure such as a laparoscopy to retrieve the migrated IUD. I also discussed the 1% risk of pregnancy with IUD use. Also discussed with patient today increased risk of ectopic pregnancy with IUD use.  Also discussed today the possibility that IUD may need to be removed secondary to bleeding profile or pain. Also discussed were the possibility that partner can feel the IUD during intercourse. I also discussed the side effects of Mirena which can be amenorrhea or dysfunctional uterine bleeding or spotting.  Patient had the opportunity to ask questions regarding insertion, risks and benefits, all questions are answered in their entirety.  Informed consent is signed.    Procedure note  Urine pregnancy test is negative, informed consent was previously signed  The bimanual exam is performed the uterus is noted to be 7 weeks in size and is mid position  A speculum was inserted into the vagina, the cervix was cleansed with Betadine swabs x3  Tenaculum was placed on the anterior lip of the cervix  Cervical os was a bit stenotic and was dilated  The uterus was sounded to a depth of 7 centimeters  The IUD  is placed under sterile conditions, without difficulty  The strings trimmed to approximately 3 cm  Tenaculum was removed from the cervix and hemostasis was achieved with silver nitrate  The patient tolerated the procedure well    Patient is asked to followup in 4 to 6 weeks for IUD check. The patient is asked to remain on pelvic rest for one week. She is asked to return to office sooner as needed for heavy vaginal bleeding, uncontrolled pain, fever, or any other concerns.             Assessment/Plan:        1. Menorrhagia with regular cycle  25-year-old with menorrhagia with regular cycle.  Patient counseled on menorrhagia and treatment.  She was contemplating possible hysterectomy but after discussion of treatment options she desires to try another Mirena IUD.  She had used IUD previously which has given her amenorrhea.  IUD was placed today    2. Dysmenorrhea  Treatment options reviewed.  Patient to continue Tylenol and ibuprofen as needed    3. Encounter for initial prescription of intrauterine contraceptive device (IUD)  Patient counseled on contraception options options for cycle control and desires Mirena IUD after counseling.  Desires IUD placement today    4. Encounter for IUD insertion  Mirena IUD was placed after counseling.  Safe sex and backup contraception discussed.  IUD string checks at home discussed.    5.  Precautions and plan of care were reviewed.  Patient to follow-up in 4 to 6 weeks for IUD surveillance.

## 2020-11-03 NOTE — NON-PROVIDER
Pt here for new pt appt  Pt states that she has been having heavy periods for 2 years and states that she bleeds in between periods and she would like to talk about an ablation or Hys.  Pharmacy  Verified  [facility] # [phone]

## 2021-04-23 ENCOUNTER — APPOINTMENT (OUTPATIENT)
Dept: RADIOLOGY | Facility: MEDICAL CENTER | Age: 26
End: 2021-04-23
Attending: EMERGENCY MEDICINE
Payer: COMMERCIAL

## 2021-04-23 ENCOUNTER — HOSPITAL ENCOUNTER (EMERGENCY)
Facility: MEDICAL CENTER | Age: 26
End: 2021-04-23
Attending: EMERGENCY MEDICINE
Payer: COMMERCIAL

## 2021-04-23 VITALS
OXYGEN SATURATION: 100 % | TEMPERATURE: 99 F | BODY MASS INDEX: 24.33 KG/M2 | SYSTOLIC BLOOD PRESSURE: 104 MMHG | HEART RATE: 80 BPM | WEIGHT: 141.76 LBS | DIASTOLIC BLOOD PRESSURE: 61 MMHG | RESPIRATION RATE: 20 BRPM

## 2021-04-23 DIAGNOSIS — R10.84 GENERALIZED ABDOMINAL PAIN: ICD-10-CM

## 2021-04-23 DIAGNOSIS — K59.00 CONSTIPATION, UNSPECIFIED CONSTIPATION TYPE: ICD-10-CM

## 2021-04-23 LAB
ALBUMIN SERPL BCP-MCNC: 4.2 G/DL (ref 3.2–4.9)
ALBUMIN/GLOB SERPL: 1.5 G/DL
ALP SERPL-CCNC: 64 U/L (ref 30–99)
ALT SERPL-CCNC: 8 U/L (ref 2–50)
ANION GAP SERPL CALC-SCNC: 11 MMOL/L (ref 7–16)
APPEARANCE UR: ABNORMAL
AST SERPL-CCNC: 12 U/L (ref 12–45)
BACTERIA #/AREA URNS HPF: ABNORMAL /HPF
BASOPHILS # BLD AUTO: 0.5 % (ref 0–1.8)
BASOPHILS # BLD: 0.03 K/UL (ref 0–0.12)
BILIRUB SERPL-MCNC: 0.5 MG/DL (ref 0.1–1.5)
BILIRUB UR QL STRIP.AUTO: NEGATIVE
BUN SERPL-MCNC: 11 MG/DL (ref 8–22)
CALCIUM SERPL-MCNC: 9 MG/DL (ref 8.4–10.2)
CHLORIDE SERPL-SCNC: 104 MMOL/L (ref 96–112)
CO2 SERPL-SCNC: 24 MMOL/L (ref 20–33)
COLOR UR: YELLOW
CREAT SERPL-MCNC: 0.69 MG/DL (ref 0.5–1.4)
EOSINOPHIL # BLD AUTO: 0.01 K/UL (ref 0–0.51)
EOSINOPHIL NFR BLD: 0.2 % (ref 0–6.9)
EPI CELLS #/AREA URNS HPF: ABNORMAL /HPF
ERYTHROCYTE [DISTWIDTH] IN BLOOD BY AUTOMATED COUNT: 38.9 FL (ref 35.9–50)
GLOBULIN SER CALC-MCNC: 2.8 G/DL (ref 1.9–3.5)
GLUCOSE SERPL-MCNC: 84 MG/DL (ref 65–99)
GLUCOSE UR STRIP.AUTO-MCNC: NEGATIVE MG/DL
HCG SERPL QL: NEGATIVE
HCT VFR BLD AUTO: 42.9 % (ref 37–47)
HGB BLD-MCNC: 14.3 G/DL (ref 12–16)
IMM GRANULOCYTES # BLD AUTO: 0.02 K/UL (ref 0–0.11)
IMM GRANULOCYTES NFR BLD AUTO: 0.3 % (ref 0–0.9)
KETONES UR STRIP.AUTO-MCNC: ABNORMAL MG/DL
LEUKOCYTE ESTERASE UR QL STRIP.AUTO: NEGATIVE
LYMPHOCYTES # BLD AUTO: 1.53 K/UL (ref 1–4.8)
LYMPHOCYTES NFR BLD: 26.3 % (ref 22–41)
MCH RBC QN AUTO: 31.2 PG (ref 27–33)
MCHC RBC AUTO-ENTMCNC: 33.3 G/DL (ref 33.6–35)
MCV RBC AUTO: 93.5 FL (ref 81.4–97.8)
MICRO URNS: ABNORMAL
MONOCYTES # BLD AUTO: 0.29 K/UL (ref 0–0.85)
MONOCYTES NFR BLD AUTO: 5 % (ref 0–13.4)
MUCOUS THREADS #/AREA URNS HPF: ABNORMAL /HPF
NEUTROPHILS # BLD AUTO: 3.93 K/UL (ref 2–7.15)
NEUTROPHILS NFR BLD: 67.7 % (ref 44–72)
NITRITE UR QL STRIP.AUTO: NEGATIVE
NRBC # BLD AUTO: 0 K/UL
NRBC BLD-RTO: 0 /100 WBC
PH UR STRIP.AUTO: 6.5 [PH] (ref 5–8)
PLATELET # BLD AUTO: 212 K/UL (ref 164–446)
PMV BLD AUTO: 9.5 FL (ref 9–12.9)
POTASSIUM SERPL-SCNC: 3.9 MMOL/L (ref 3.6–5.5)
PROT SERPL-MCNC: 7 G/DL (ref 6–8.2)
PROT UR QL STRIP: NEGATIVE MG/DL
RBC # BLD AUTO: 4.59 M/UL (ref 4.2–5.4)
RBC # URNS HPF: ABNORMAL /HPF
RBC UR QL AUTO: ABNORMAL
SODIUM SERPL-SCNC: 139 MMOL/L (ref 135–145)
SP GR UR STRIP.AUTO: 1.02
WBC # BLD AUTO: 5.8 K/UL (ref 4.8–10.8)
WBC #/AREA URNS HPF: ABNORMAL /HPF

## 2021-04-23 PROCEDURE — 80053 COMPREHEN METABOLIC PANEL: CPT

## 2021-04-23 PROCEDURE — 700117 HCHG RX CONTRAST REV CODE 255: Performed by: EMERGENCY MEDICINE

## 2021-04-23 PROCEDURE — A9270 NON-COVERED ITEM OR SERVICE: HCPCS | Performed by: EMERGENCY MEDICINE

## 2021-04-23 PROCEDURE — 85025 COMPLETE CBC W/AUTO DIFF WBC: CPT

## 2021-04-23 PROCEDURE — 84703 CHORIONIC GONADOTROPIN ASSAY: CPT

## 2021-04-23 PROCEDURE — 700111 HCHG RX REV CODE 636 W/ 250 OVERRIDE (IP): Performed by: EMERGENCY MEDICINE

## 2021-04-23 PROCEDURE — 700105 HCHG RX REV CODE 258: Performed by: EMERGENCY MEDICINE

## 2021-04-23 PROCEDURE — 74177 CT ABD & PELVIS W/CONTRAST: CPT

## 2021-04-23 PROCEDURE — 700102 HCHG RX REV CODE 250 W/ 637 OVERRIDE(OP): Performed by: EMERGENCY MEDICINE

## 2021-04-23 PROCEDURE — 99285 EMERGENCY DEPT VISIT HI MDM: CPT

## 2021-04-23 PROCEDURE — 96374 THER/PROPH/DIAG INJ IV PUSH: CPT

## 2021-04-23 PROCEDURE — 96375 TX/PRO/DX INJ NEW DRUG ADDON: CPT

## 2021-04-23 PROCEDURE — 700101 HCHG RX REV CODE 250: Performed by: EMERGENCY MEDICINE

## 2021-04-23 PROCEDURE — 81001 URINALYSIS AUTO W/SCOPE: CPT

## 2021-04-23 RX ORDER — MORPHINE SULFATE 4 MG/ML
4 INJECTION, SOLUTION INTRAMUSCULAR; INTRAVENOUS ONCE
Status: COMPLETED | OUTPATIENT
Start: 2021-04-23 | End: 2021-04-23

## 2021-04-23 RX ORDER — ENEMA 19; 7 G/133ML; G/133ML
1 ENEMA RECTAL ONCE
Status: DISCONTINUED | OUTPATIENT
Start: 2021-04-23 | End: 2021-04-23

## 2021-04-23 RX ORDER — SODIUM CHLORIDE 9 MG/ML
1000 INJECTION, SOLUTION INTRAVENOUS ONCE
Status: COMPLETED | OUTPATIENT
Start: 2021-04-23 | End: 2021-04-23

## 2021-04-23 RX ORDER — ONDANSETRON 2 MG/ML
4 INJECTION INTRAMUSCULAR; INTRAVENOUS ONCE
Status: COMPLETED | OUTPATIENT
Start: 2021-04-23 | End: 2021-04-23

## 2021-04-23 RX ORDER — POLYETHYLENE GLYCOL 3350, SODIUM SULFATE ANHYDROUS, SODIUM BICARBONATE, SODIUM CHLORIDE, POTASSIUM CHLORIDE 236; 22.74; 6.74; 5.86; 2.97 G/4L; G/4L; G/4L; G/4L; G/4L
240 POWDER, FOR SOLUTION ORAL ONCE
Qty: 240 ML | Refills: 0 | Status: SHIPPED | OUTPATIENT
Start: 2021-04-23 | End: 2021-04-23

## 2021-04-23 RX ADMIN — MAGNESIUM HYDROXIDE 30 ML: 400 SUSPENSION ORAL at 22:12

## 2021-04-23 RX ADMIN — ONDANSETRON 4 MG: 2 INJECTION INTRAMUSCULAR; INTRAVENOUS at 21:20

## 2021-04-23 RX ADMIN — MORPHINE SULFATE 4 MG: 4 INJECTION INTRAVENOUS at 21:20

## 2021-04-23 RX ADMIN — SODIUM PHOSPHATE, DIBASIC AND SODIUM PHOSPHATE, MONOBASIC 133 ML: 7; 19 ENEMA RECTAL at 22:05

## 2021-04-23 RX ADMIN — IOHEXOL 100 ML: 350 INJECTION, SOLUTION INTRAVENOUS at 21:42

## 2021-04-23 RX ADMIN — SODIUM CHLORIDE 1000 ML: 9 INJECTION, SOLUTION INTRAVENOUS at 21:20

## 2021-04-23 ASSESSMENT — PAIN DESCRIPTION - PAIN TYPE: TYPE: ACUTE PAIN

## 2021-04-24 NOTE — ED NOTES
"Pt reports she had gastric sleeve done three years ago and has been intermittent constipation since then. Pt reports today is the 5th day since last BM; has been trying miralax (x2 days), magnesium citrate (x2 today), and stool softener.    Pt reports it feels like it's \"down there\" and is stuck but she feels she cannot push any more. Pt has not taken any pain medication. Pt rate pain 9/10  "

## 2021-04-24 NOTE — ED NOTES
Pt provided paper copy of AVS. Rn and patient went through discharge instructions. Pt offered follow up call. Pt verbalized understanding of discharge teachings, agrees with discharge plan.    Pt dressed appropriately for the weather.    RX: e RX reviewed    Pt ambulatory with steady gait at discharge.

## 2021-04-24 NOTE — DISCHARGE INSTRUCTIONS
Continue to drink plenty of fluids.  Try to increase the fiber in your diet.  You may also want to try some daily MiraLAX until you are more regular.  Any worsening pain, fevers, vomiting or any other concern return to the ER.  I hope you feel better soon.

## 2021-04-24 NOTE — ED NOTES
Pt passed large solid BM, several segments. Pt tearful; taking PO Milk of Mag. Pt aware she may have  Few more BMs. This RN let covering MD know of stool result.

## 2021-04-24 NOTE — ED PROVIDER NOTES
ED Provider Note  CHIEF COMPLAINT  Chief Complaint   Patient presents with   • Abdominal Pain   • Constipation       HPI  Mary Carmen Richard is a 26 y.o. female who presents with abdominal pain and constipation.  Symptoms started about a week ago.  She states she has not been able to have a bowel movement in the last 5 days.  She complains of a tightness in her abdomen.  She also has pain radiating into her back.  She denies any dysuria or hematuria.  She occasionally has some blood in her bowel movements and when she wipes.  She has had problems with constipation since her gastric sleeve surgery a few years ago.  She has tried multiple medications at home without relief of her constipation.  She states she feels like she has to go but she just cannot.  No fevers.  No vomiting.  Denies pregnancy.    REVIEW OF SYSTEMS  See HPI for further details. All other systems are negative.     PAST MEDICAL HISTORY  Past Medical History:   Diagnosis Date   • Anxiety    • Asthma     history of asthma; childhood asthma   • Depression     history of   • Ovarian cyst        FAMILY HISTORY  [unfilled]    SOCIAL HISTORY  Social History     Socioeconomic History   • Marital status:      Spouse name: Not on file   • Number of children: 2   • Years of education: Not on file   • Highest education level: Not on file   Occupational History   • Not on file   Tobacco Use   • Smoking status: Never Smoker   • Smokeless tobacco: Never Used   • Tobacco comment: vape    Substance and Sexual Activity   • Alcohol use: No   • Drug use: No   • Sexual activity: Yes     Partners: Male     Birth control/protection: Other-See Comments     Comment: none   Other Topics Concern   • Not on file   Social History Narrative   • Not on file     Social Determinants of Health     Financial Resource Strain:    • Difficulty of Paying Living Expenses:    Food Insecurity:    • Worried About Running Out of Food in the Last Year:    • Ran Out of Food in the  Last Year:    Transportation Needs:    • Lack of Transportation (Medical):    • Lack of Transportation (Non-Medical):    Physical Activity:    • Days of Exercise per Week:    • Minutes of Exercise per Session:    Stress:    • Feeling of Stress :    Social Connections:    • Frequency of Communication with Friends and Family:    • Frequency of Social Gatherings with Friends and Family:    • Attends Congregation Services:    • Active Member of Clubs or Organizations:    • Attends Club or Organization Meetings:    • Marital Status:    Intimate Partner Violence:    • Fear of Current or Ex-Partner:    • Emotionally Abused:    • Physically Abused:    • Sexually Abused:        SURGICAL HISTORY  Past Surgical History:   Procedure Laterality Date   • GASTRIC SLEEVE LAPAROSCOPY  2/13/2018    Procedure: GASTRIC SLEEVE LAPAROSCOPY;  Surgeon: Quintin Nguyen M.D.;  Location: SURGERY [facility];  Service: General   • PRIMARY C SECTION  3/28/2015    Performed by Lucy Valdez M.D. at LABOR AND DELIVERY   • CERVICAL CERCLAGE  2/20/2015    Performed by Quinn Grimaldo M.D. at LABOR AND DELIVERY   • TONSILLECTOMY  2000   • OTHER      wisdom teeth       CURRENT MEDICATIONS   Home Medications     Reviewed by Norma Lr R.N. (Registered Nurse) on 04/23/21 at 1949  Med List Status: Partial   Medication Last Dose Status   albuterol 108 (90 Base) MCG/ACT Aero Soln inhalation aerosol  Active   amoxicillin (AMOXIL) 500 MG Cap  Active   Dextromethorphan-guaiFENesin (TUSSIN DM)  MG/5ML Syrup  Active   PARAGARD INTRAUTERINE COPPER IUD 4/23/2021 Active                ALLERGIES  No Known Allergies    PHYSICAL EXAM  VITAL SIGNS: /86   Pulse 97   Temp 37.2 °C (99 °F) (Temporal)   Resp 20   Wt 64.3 kg (141 lb 12.1 oz)   SpO2 100%   BMI 24.33 kg/m²       Constitutional: Well developed, mild acute distress, crying Non-toxic appearance.   HENT: Normocephalic, Atraumatic, Bilateral external ears normal,  Nose normal.   Eyes:  PERRL, EOMI, Conjunctiva normal  Neck: Normal range of motion, No tenderness, Supple, No stridor.    Cardiovascular: Normal heart rate, Normal rhythm  Thorax & Lungs: Normal breath sounds, No respiratory distress,   Abdomen: Mild diffuse tenderness to palpation, no distention, positive bowel sounds, no rebound or guarding  Rectal: No external hemorrhoids seen, stool is brown, there is stool in the rectal vault that is soft  Skin: Warm, Dry, No erythema, No rash.   Back: No tenderness, No CVA tenderness.   Extremities: Intact distal pulses, No edema, No tenderness   Neurologic: Alert & oriented x 3, Normal motor function, Normal sensory function, No focal deficits noted.   Psychiatric: appropriate      Labs  Results for orders placed or performed during the hospital encounter of 04/23/21   CBC WITH DIFFERENTIAL   Result Value Ref Range    WBC 5.8 4.8 - 10.8 K/uL    RBC 4.59 4.20 - 5.40 M/uL    Hemoglobin 14.3 12.0 - 16.0 g/dL    Hematocrit 42.9 37.0 - 47.0 %    MCV 93.5 81.4 - 97.8 fL    MCH 31.2 27.0 - 33.0 pg    MCHC 33.3 (L) 33.6 - 35.0 g/dL    RDW 38.9 35.9 - 50.0 fL    Platelet Count 212 164 - 446 K/uL    MPV 9.5 9.0 - 12.9 fL    Neutrophils-Polys 67.70 44.00 - 72.00 %    Lymphocytes 26.30 22.00 - 41.00 %    Monocytes 5.00 0.00 - 13.40 %    Eosinophils 0.20 0.00 - 6.90 %    Basophils 0.50 0.00 - 1.80 %    Immature Granulocytes 0.30 0.00 - 0.90 %    Nucleated RBC 0.00 /100 WBC    Neutrophils (Absolute) 3.93 2.00 - 7.15 K/uL    Lymphs (Absolute) 1.53 1.00 - 4.80 K/uL    Monos (Absolute) 0.29 0.00 - 0.85 K/uL    Eos (Absolute) 0.01 0.00 - 0.51 K/uL    Baso (Absolute) 0.03 0.00 - 0.12 K/uL    Immature Granulocytes (abs) 0.02 0.00 - 0.11 K/uL    NRBC (Absolute) 0.00 K/uL   COMP METABOLIC PANEL   Result Value Ref Range    Sodium 139 135 - 145 mmol/L    Potassium 3.9 3.6 - 5.5 mmol/L    Chloride 104 96 - 112 mmol/L    Co2 24 20 - 33 mmol/L    Anion Gap 11.0 7.0 - 16.0    Glucose 84 65 - 99 mg/dL    Bun 11 8 - 22 mg/dL     Creatinine 0.69 0.50 - 1.40 mg/dL    Calcium 9.0 8.4 - 10.2 mg/dL    AST(SGOT) 12 12 - 45 U/L    ALT(SGPT) 8 2 - 50 U/L    Alkaline Phosphatase 64 30 - 99 U/L    Total Bilirubin 0.5 0.1 - 1.5 mg/dL    Albumin 4.2 3.2 - 4.9 g/dL    Total Protein 7.0 6.0 - 8.2 g/dL    Globulin 2.8 1.9 - 3.5 g/dL    A-G Ratio 1.5 g/dL   URINALYSIS (UA)    Specimen: Urine   Result Value Ref Range    Color Yellow     Character Hazy (A)     Specific Gravity 1.020 <1.035    Ph 6.5 5.0 - 8.0    Glucose Negative Negative mg/dL    Ketones Trace (A) Negative mg/dL    Protein Negative Negative mg/dL    Bilirubin Negative Negative    Nitrite Negative Negative    Leukocyte Esterase Negative Negative    Occult Blood Trace (A) Negative    Micro Urine Req Microscopic    HCG QUAL SERUM   Result Value Ref Range    Beta-Hcg Qualitative Serum Negative Negative   ESTIMATED GFR   Result Value Ref Range    GFR If African American >60 >60 mL/min/1.73 m 2    GFR If Non African American >60 >60 mL/min/1.73 m 2   URINE MICROSCOPIC (W/UA)   Result Value Ref Range    WBC 2-5 /hpf    RBC 2-5 (A) /hpf    Bacteria Few (A) None /hpf    Epithelial Cells Moderate (A) Few /hpf    Mucous Threads Moderate /hpf       RADIOLOGY/PROCEDURES  CT-ABDOMEN-PELVIS WITH   Final Result      1.  Increased colonic stool suggesting constipation.   2.  No evidence of bowel obstruction.   3.  No secondary signs of acute appendicitis, however the appendix is not visualized.   4.  Prior gastric sleeve procedure.          COURSE & MEDICAL DECISION MAKING  Pertinent Labs & Imaging studies reviewed. (See chart for details)  Patient presents with abdominal pain and complaints of constipation.  No bowel movement in 5 days.  She has a soft abdomen with no distention and no history of vomiting.  Therefore I doubt bowel obstruction.  There is no focal right lower quadrant tenderness to suggest appendicitis but she is diffusely tender.  She looks quite uncomfortable.  Plan at this time is  obtain CT to further evaluate her constipation and rule out another etiology for her pain.  Also check basic laboratory studies.    Laboratory studies show normal white count without evidence of bandemia.  There is no significant electrolyte abnormalities.  Urine does not suggest a urinary tract infection.  CT scan shows constipation without evidence of bowel obstruction.  Patient was having pain.  We discussed the risk benefits of different treatment options.  She states Toradol does not help.  We discussed that morphine could make her constipation worse.  However she feels that the pain in her sacral area would improve and perhaps would help her have a bowel movement.    We have discussed different treatment options for her constipation.  She would like to try an enema prior to going home.  I have written a prescription for GoLYTELY for her to drink when she gets home to further help with her constipation.    Care will be turned over to Dr. Raymond for reevaluation after the enema.    FINAL IMPRESSION     1. Generalized abdominal pain    2. Constipation, unspecified constipation type             Electronically signed by: Lenore Sharpe M.D., 4/23/2021 8:04 PM

## 2021-04-24 NOTE — ED NOTES
Pt requesting pain medication and IV fluids. Reports she has not really been able to eat or drink for past 3 days because she is worried about getting bloated and backed up

## 2022-10-09 ENCOUNTER — APPOINTMENT (OUTPATIENT)
Dept: RADIOLOGY | Facility: MEDICAL CENTER | Age: 27
End: 2022-10-09
Attending: EMERGENCY MEDICINE
Payer: MEDICAID

## 2022-10-09 ENCOUNTER — HOSPITAL ENCOUNTER (EMERGENCY)
Facility: MEDICAL CENTER | Age: 27
End: 2022-10-09
Attending: EMERGENCY MEDICINE
Payer: MEDICAID

## 2022-10-09 VITALS
RESPIRATION RATE: 16 BRPM | OXYGEN SATURATION: 100 % | WEIGHT: 131.17 LBS | DIASTOLIC BLOOD PRESSURE: 65 MMHG | TEMPERATURE: 100.1 F | BODY MASS INDEX: 22.39 KG/M2 | HEIGHT: 64 IN | HEART RATE: 50 BPM | SYSTOLIC BLOOD PRESSURE: 108 MMHG

## 2022-10-09 DIAGNOSIS — R19.7 DIARRHEA, UNSPECIFIED TYPE: ICD-10-CM

## 2022-10-09 DIAGNOSIS — R10.84 GENERALIZED ABDOMINAL PAIN: ICD-10-CM

## 2022-10-09 DIAGNOSIS — N39.0 ACUTE UTI: ICD-10-CM

## 2022-10-09 DIAGNOSIS — R11.2 NAUSEA AND VOMITING, UNSPECIFIED VOMITING TYPE: ICD-10-CM

## 2022-10-09 LAB
ALBUMIN SERPL BCP-MCNC: 4.8 G/DL (ref 3.2–4.9)
ALBUMIN/GLOB SERPL: 1.5 G/DL
ALP SERPL-CCNC: 83 U/L (ref 30–99)
ALT SERPL-CCNC: 9 U/L (ref 2–50)
ANION GAP SERPL CALC-SCNC: 14 MMOL/L (ref 7–16)
APPEARANCE UR: CLEAR
AST SERPL-CCNC: 14 U/L (ref 12–45)
BACTERIA #/AREA URNS HPF: ABNORMAL /HPF
BASOPHILS # BLD AUTO: 0.2 % (ref 0–1.8)
BASOPHILS # BLD: 0.02 K/UL (ref 0–0.12)
BILIRUB SERPL-MCNC: 0.7 MG/DL (ref 0.1–1.5)
BILIRUB UR QL STRIP.AUTO: ABNORMAL
BUN SERPL-MCNC: 13 MG/DL (ref 8–22)
CALCIUM SERPL-MCNC: 9.3 MG/DL (ref 8.4–10.2)
CHLORIDE SERPL-SCNC: 105 MMOL/L (ref 96–112)
CO2 SERPL-SCNC: 21 MMOL/L (ref 20–33)
COLOR UR: YELLOW
CREAT SERPL-MCNC: 0.87 MG/DL (ref 0.5–1.4)
EOSINOPHIL # BLD AUTO: 0.01 K/UL (ref 0–0.51)
EOSINOPHIL NFR BLD: 0.1 % (ref 0–6.9)
EPI CELLS #/AREA URNS HPF: ABNORMAL /HPF
ERYTHROCYTE [DISTWIDTH] IN BLOOD BY AUTOMATED COUNT: 39 FL (ref 35.9–50)
FLUAV RNA SPEC QL NAA+PROBE: NEGATIVE
FLUBV RNA SPEC QL NAA+PROBE: NEGATIVE
GFR SERPLBLD CREATININE-BSD FMLA CKD-EPI: 94 ML/MIN/1.73 M 2
GLOBULIN SER CALC-MCNC: 3.3 G/DL (ref 1.9–3.5)
GLUCOSE SERPL-MCNC: 95 MG/DL (ref 65–99)
GLUCOSE UR STRIP.AUTO-MCNC: NEGATIVE MG/DL
HCG SERPL QL: NEGATIVE
HCT VFR BLD AUTO: 45 % (ref 37–47)
HGB BLD-MCNC: 15.3 G/DL (ref 12–16)
IMM GRANULOCYTES # BLD AUTO: 0.03 K/UL (ref 0–0.11)
IMM GRANULOCYTES NFR BLD AUTO: 0.4 % (ref 0–0.9)
KETONES UR STRIP.AUTO-MCNC: 40 MG/DL
LEUKOCYTE ESTERASE UR QL STRIP.AUTO: NEGATIVE
LIPASE SERPL-CCNC: 33 U/L (ref 7–58)
LYMPHOCYTES # BLD AUTO: 1.18 K/UL (ref 1–4.8)
LYMPHOCYTES NFR BLD: 14.7 % (ref 22–41)
MCH RBC QN AUTO: 31.5 PG (ref 27–33)
MCHC RBC AUTO-ENTMCNC: 34 G/DL (ref 33.6–35)
MCV RBC AUTO: 92.8 FL (ref 81.4–97.8)
MICRO URNS: ABNORMAL
MONOCYTES # BLD AUTO: 0.34 K/UL (ref 0–0.85)
MONOCYTES NFR BLD AUTO: 4.2 % (ref 0–13.4)
NEUTROPHILS # BLD AUTO: 6.44 K/UL (ref 2–7.15)
NEUTROPHILS NFR BLD: 80.4 % (ref 44–72)
NITRITE UR QL STRIP.AUTO: NEGATIVE
NRBC # BLD AUTO: 0 K/UL
NRBC BLD-RTO: 0 /100 WBC
PH UR STRIP.AUTO: 6 [PH] (ref 5–8)
PLATELET # BLD AUTO: 245 K/UL (ref 164–446)
PMV BLD AUTO: 9 FL (ref 9–12.9)
POTASSIUM SERPL-SCNC: 3.9 MMOL/L (ref 3.6–5.5)
PROT SERPL-MCNC: 8.1 G/DL (ref 6–8.2)
PROT UR QL STRIP: >=300 MG/DL
RBC # BLD AUTO: 4.85 M/UL (ref 4.2–5.4)
RBC # URNS HPF: ABNORMAL /HPF
RBC UR QL AUTO: NEGATIVE
RSV RNA SPEC QL NAA+PROBE: NEGATIVE
SARS-COV-2 RNA RESP QL NAA+PROBE: NOTDETECTED
SODIUM SERPL-SCNC: 140 MMOL/L (ref 135–145)
SP GR UR STRIP.AUTO: 1.02
SPECIMEN SOURCE: NORMAL
WBC # BLD AUTO: 8 K/UL (ref 4.8–10.8)
WBC #/AREA URNS HPF: ABNORMAL /HPF

## 2022-10-09 PROCEDURE — 700105 HCHG RX REV CODE 258: Performed by: EMERGENCY MEDICINE

## 2022-10-09 PROCEDURE — 81001 URINALYSIS AUTO W/SCOPE: CPT

## 2022-10-09 PROCEDURE — 84703 CHORIONIC GONADOTROPIN ASSAY: CPT

## 2022-10-09 PROCEDURE — 96375 TX/PRO/DX INJ NEW DRUG ADDON: CPT

## 2022-10-09 PROCEDURE — 83690 ASSAY OF LIPASE: CPT

## 2022-10-09 PROCEDURE — 74177 CT ABD & PELVIS W/CONTRAST: CPT

## 2022-10-09 PROCEDURE — 96376 TX/PRO/DX INJ SAME DRUG ADON: CPT

## 2022-10-09 PROCEDURE — 700111 HCHG RX REV CODE 636 W/ 250 OVERRIDE (IP): Performed by: EMERGENCY MEDICINE

## 2022-10-09 PROCEDURE — 0241U HCHG SARS-COV-2 COVID-19 NFCT DS RESP RNA 4 TRGT MIC: CPT

## 2022-10-09 PROCEDURE — 700117 HCHG RX CONTRAST REV CODE 255: Performed by: EMERGENCY MEDICINE

## 2022-10-09 PROCEDURE — 99285 EMERGENCY DEPT VISIT HI MDM: CPT

## 2022-10-09 PROCEDURE — C9803 HOPD COVID-19 SPEC COLLECT: HCPCS | Performed by: EMERGENCY MEDICINE

## 2022-10-09 PROCEDURE — 36415 COLL VENOUS BLD VENIPUNCTURE: CPT

## 2022-10-09 PROCEDURE — 85025 COMPLETE CBC W/AUTO DIFF WBC: CPT

## 2022-10-09 PROCEDURE — 87086 URINE CULTURE/COLONY COUNT: CPT

## 2022-10-09 PROCEDURE — 76856 US EXAM PELVIC COMPLETE: CPT

## 2022-10-09 PROCEDURE — 700111 HCHG RX REV CODE 636 W/ 250 OVERRIDE (IP)

## 2022-10-09 PROCEDURE — 80053 COMPREHEN METABOLIC PANEL: CPT

## 2022-10-09 PROCEDURE — 96374 THER/PROPH/DIAG INJ IV PUSH: CPT | Mod: XU

## 2022-10-09 RX ORDER — CEPHALEXIN 500 MG/1
500 CAPSULE ORAL 3 TIMES DAILY
Qty: 15 CAPSULE | Refills: 0 | Status: SHIPPED | OUTPATIENT
Start: 2022-10-09 | End: 2022-10-14

## 2022-10-09 RX ORDER — CEFTRIAXONE 2 G/1
2 INJECTION, POWDER, FOR SOLUTION INTRAMUSCULAR; INTRAVENOUS ONCE
Status: COMPLETED | OUTPATIENT
Start: 2022-10-09 | End: 2022-10-09

## 2022-10-09 RX ORDER — KETOROLAC TROMETHAMINE 30 MG/ML
INJECTION, SOLUTION INTRAMUSCULAR; INTRAVENOUS
Status: COMPLETED
Start: 2022-10-09 | End: 2022-10-09

## 2022-10-09 RX ORDER — HYDROMORPHONE HYDROCHLORIDE 1 MG/ML
0.5 INJECTION, SOLUTION INTRAMUSCULAR; INTRAVENOUS; SUBCUTANEOUS ONCE
Status: COMPLETED | OUTPATIENT
Start: 2022-10-09 | End: 2022-10-09

## 2022-10-09 RX ORDER — IBUPROFEN 800 MG/1
800 TABLET ORAL EVERY 8 HOURS PRN
Status: SHIPPED | COMMUNITY
End: 2023-03-11

## 2022-10-09 RX ORDER — SODIUM CHLORIDE 9 MG/ML
1000 INJECTION, SOLUTION INTRAVENOUS ONCE
Status: COMPLETED | OUTPATIENT
Start: 2022-10-09 | End: 2022-10-09

## 2022-10-09 RX ORDER — MORPHINE SULFATE 4 MG/ML
4 INJECTION INTRAVENOUS ONCE
Status: COMPLETED | OUTPATIENT
Start: 2022-10-09 | End: 2022-10-09

## 2022-10-09 RX ORDER — ONDANSETRON 4 MG/1
4 TABLET, ORALLY DISINTEGRATING ORAL EVERY 6 HOURS PRN
Qty: 10 TABLET | Refills: 0 | Status: SHIPPED | OUTPATIENT
Start: 2022-10-09 | End: 2023-03-11

## 2022-10-09 RX ORDER — ONDANSETRON 2 MG/ML
4 INJECTION INTRAMUSCULAR; INTRAVENOUS ONCE
Status: COMPLETED | OUTPATIENT
Start: 2022-10-09 | End: 2022-10-09

## 2022-10-09 RX ORDER — DICYCLOMINE HCL 20 MG
20 TABLET ORAL EVERY 6 HOURS
Qty: 30 TABLET | Refills: 0 | Status: SHIPPED | OUTPATIENT
Start: 2022-10-09 | End: 2023-03-11

## 2022-10-09 RX ORDER — KETOROLAC TROMETHAMINE 30 MG/ML
15 INJECTION, SOLUTION INTRAMUSCULAR; INTRAVENOUS ONCE
Status: COMPLETED | OUTPATIENT
Start: 2022-10-09 | End: 2022-10-09

## 2022-10-09 RX ADMIN — HYDROMORPHONE HYDROCHLORIDE 0.5 MG: 1 INJECTION, SOLUTION INTRAMUSCULAR; INTRAVENOUS; SUBCUTANEOUS at 12:20

## 2022-10-09 RX ADMIN — KETOROLAC TROMETHAMINE 15 MG: 30 INJECTION, SOLUTION INTRAMUSCULAR; INTRAVENOUS at 12:20

## 2022-10-09 RX ADMIN — IOHEXOL 100 ML: 350 INJECTION, SOLUTION INTRAVENOUS at 11:33

## 2022-10-09 RX ADMIN — CEFTRIAXONE SODIUM 2 G: 2 INJECTION, POWDER, FOR SOLUTION INTRAMUSCULAR; INTRAVENOUS at 12:16

## 2022-10-09 RX ADMIN — HYDROMORPHONE HYDROCHLORIDE 0.5 MG: 1 INJECTION, SOLUTION INTRAMUSCULAR; INTRAVENOUS; SUBCUTANEOUS at 14:02

## 2022-10-09 RX ADMIN — ONDANSETRON 4 MG: 2 INJECTION INTRAMUSCULAR; INTRAVENOUS at 10:21

## 2022-10-09 RX ADMIN — SODIUM CHLORIDE 1000 ML: 9 INJECTION, SOLUTION INTRAVENOUS at 12:15

## 2022-10-09 RX ADMIN — MORPHINE SULFATE 4 MG: 4 INJECTION INTRAVENOUS at 10:21

## 2022-10-09 RX ADMIN — SODIUM CHLORIDE 1000 ML: 9 INJECTION, SOLUTION INTRAVENOUS at 10:21

## 2022-10-09 ASSESSMENT — FIBROSIS 4 INDEX: FIB4 SCORE: 0.52

## 2022-10-09 ASSESSMENT — PAIN DESCRIPTION - DESCRIPTORS: DESCRIPTORS: ACHING

## 2022-10-09 ASSESSMENT — PAIN DESCRIPTION - PAIN TYPE: TYPE: ACUTE PAIN

## 2022-10-09 NOTE — DISCHARGE INSTRUCTIONS
You were seen in the ER for abdominal pain, nausea, vomiting, and diarrhea.  Thankfully, your labs and imaging did not reveal an acute abnormality that requires further work-up, consultation, or admission to the hospital.  Your urinalysis did suggest that she were dehydrated and also suggested an infection.  You received 2 L of IV fluids as well as antibiotics.  You are now able to tolerate fluids by mouth and your pain is relatively well controlled.  I feel you are safe for discharge but if your symptoms persist or worsen you may have to return for additional work-up and management.  I recommend taking in at least 8 ounces of clear liquids every hour in order to prevent dehydration.  I have called you in a prescription for antibiotics, take these as directed.  I also called you in a prescription for Bentyl which is to help with abdominal cramping.  You can take Imodium for the diarrhea as well.  Please return immediately to the ER if you develop new or worsening symptoms.  Otherwise follow-up with your primary care physician this week for recheck.  Good luck, I hope you feel better soon!

## 2022-10-09 NOTE — ED TRIAGE NOTES
"Chief Complaint   Patient presents with    Nausea/Vomiting/Diarrhea     Pt stated that it started about 4-days ago, happened over 10x today, Pt stated that she is unable to keep anything down; the diarrhea started 3-days ago, 7-8x's today;     Fever     Pt stated that she has had a fever for the past 5-days, took tylenol yesterday; Pt stated that he did not receive any COVID vaccines;     Ear Pain     Rt side, started about 3-days ago;    Abdominal Pain      ED Triage Vitals [10/09/22 0905]   Enc Vitals Group      Blood Pressure 107/65      Pulse 75      Respiration 15      Temperature 37.8 °C (100.1 °F)      Temp src Temporal      Pulse Oximetry 97 %      Weight 59.5 kg (131 lb 2.8 oz)      Height 1.626 m (5' 4\")      Head Circumference       Peak Flow       Pain Score       Pain Loc       Pain Edu?       Excl. in GC?       "

## 2022-10-09 NOTE — ED PROVIDER NOTES
ED Provider Note    CHIEF COMPLAINT  No chief complaint on file.      HPI  Anastasiiarosskiana Richard is a 27 y.o. female who presents with a chief complaint of right lower quadrant pain, nausea, vomiting, and diarrhea that has been ongoing for the past 5 days.  The pain does not radiate.  She has been trying Tylenol without improvement.  Her last dose was last night.  She notes that her diarrhea is black but she has been using Pepto-Bismol as well.  She denies any hematemesis or hematochezia.  She has had a fever as high as 104 °F.  She notes associated muscle aches.  She is not vaccinated against influenza or COVID.  She has been unable to sleep because of her discomfort.  She denies alcohol or drug use.  She has an IUD in place and denies chance of pregnancy.  She denies any vaginal discharge or concern for sexually transmitted infection.  She denies chest pain, shortness of breath, constipation, dysuria, hematuria.    REVIEW OF SYSTEMS  See HPI for further details.  Right lower quadrant abdominal pain.  Nausea.  Vomiting.  Diarrhea.  Muscle aches.  Fever.  All other systems are negative.     PAST MEDICAL HISTORY   has a past medical history of Anxiety, Asthma, Depression, and Ovarian cyst.    SOCIAL HISTORY  Social History     Tobacco Use    Smoking status: Never    Smokeless tobacco: Never    Tobacco comments:     vape    Vaping Use    Vaping Use: Every day   Substance and Sexual Activity    Alcohol use: No    Drug use: No    Sexual activity: Yes     Partners: Male     Birth control/protection: Other-See Comments     Comment: none       SURGICAL HISTORY   has a past surgical history that includes tonsillectomy (2000); cervical cerclage (2/20/2015); other; primary c section (3/28/2015); and gastric sleeve laparoscopy (2/13/2018).    CURRENT MEDICATIONS  Home Medications    **Home medications have not yet been reviewed for this encounter**         ALLERGIES  No Known Allergies    PHYSICAL EXAM  VITAL SIGNS: BP  "107/65   Pulse 75   Temp 37.8 °C (100.1 °F) (Temporal)   Resp 15   Ht 1.626 m (5' 4\")   Wt 59.5 kg (131 lb 2.8 oz)   SpO2 97%   BMI 22.52 kg/m²    Pulse ox interpretation: I interpret this pulse ox as normal.  Constitutional: Alert, uncomfortable appearing..  HENT: No signs of trauma, Bilateral external ears normal, Nose normal.  Dry mucous membranes.  Eyes: Pupils are equal and reactive, Conjunctiva normal, Non-icteric.   Neck: Normal range of motion, Supple, No stridor.   Lymphatic: No lymphadenopathy noted.   Cardiovascular: Regular rate and rhythm, no murmurs. Pulses symmetrical.  Thorax & Lungs: Normal breath sounds, No respiratory distress, No wheezing.   Abdomen: Bowel sounds normal, Soft, diffuse tenderness worst in the right lower quadrant with associated voluntary guarding, No masses, No pulsatile masses. No peritoneal signs.  Skin: Warm, Dry, No erythema, No rash.   Back: No bony tenderness, no CVA tenderness.   Extremities: No cyanosis.  Musculoskeletal: No major deformities noted.   Neurologic: Alert, No focal deficits noted.   Psychiatric: Affect normal, Judgment normal, Mood normal.     DIAGNOSTIC STUDIES / PROCEDURES    LABS  Results for orders placed or performed during the hospital encounter of 10/09/22   CBC WITH DIFFERENTIAL   Result Value Ref Range    WBC 8.0 4.8 - 10.8 K/uL    RBC 4.85 4.20 - 5.40 M/uL    Hemoglobin 15.3 12.0 - 16.0 g/dL    Hematocrit 45.0 37.0 - 47.0 %    MCV 92.8 81.4 - 97.8 fL    MCH 31.5 27.0 - 33.0 pg    MCHC 34.0 33.6 - 35.0 g/dL    RDW 39.0 35.9 - 50.0 fL    Platelet Count 245 164 - 446 K/uL    MPV 9.0 9.0 - 12.9 fL    Neutrophils-Polys 80.40 (H) 44.00 - 72.00 %    Lymphocytes 14.70 (L) 22.00 - 41.00 %    Monocytes 4.20 0.00 - 13.40 %    Eosinophils 0.10 0.00 - 6.90 %    Basophils 0.20 0.00 - 1.80 %    Immature Granulocytes 0.40 0.00 - 0.90 %    Nucleated RBC 0.00 /100 WBC    Neutrophils (Absolute) 6.44 2.00 - 7.15 K/uL    Lymphs (Absolute) 1.18 1.00 - 4.80 K/uL "    Monos (Absolute) 0.34 0.00 - 0.85 K/uL    Eos (Absolute) 0.01 0.00 - 0.51 K/uL    Baso (Absolute) 0.02 0.00 - 0.12 K/uL    Immature Granulocytes (abs) 0.03 0.00 - 0.11 K/uL    NRBC (Absolute) 0.00 K/uL   CoV-2, FLU A/B, and RSV by PCR (2-4 Hours CEPHEID) : Collect NP swab in VTM    Specimen: Respirate   Result Value Ref Range    SARS-CoV-2 Source Nasal Swab      RADIOLOGY  US-PELVIC COMPLETE (TRANSABDOMINAL/TRANSVAGINAL) (COMBO)   Final Result      1.  Pelvic ultrasound within normal limits      2.  Small hemorrhagic cyst in the right ovary identified on CT is not apparent on ultrasound. Based on the CT this is an incidental finding.      CT-ABDOMEN-PELVIS WITH   Final Result      1.  2.4 cm irregular right adnexal cyst with internal increase in density consistent with a hemorrhagic cyst      2.  No inflammatory process identified within the abdomen or pelvis      3.  intrauterine device present      4.  Prior gastric surgery        COURSE & MEDICAL DECISION MAKING  Pertinent Labs & Imaging studies reviewed. (See chart for details)  This is a 27-year-old female here with right lower quadrant abdominal pain, nausea, vomiting, and diarrhea.  Differential diagnosis includes, but is not limited to, appendicitis, mesenteric adenitis, viral syndrome, urinary tract infection, tubo-ovarian abscess, ovarian torsion, ureterolithiasis.    Arrives with an elevated temperature of 100.1 °F but is not overtly febrile.  Remainder of vital signs are normal.  She appears dehydrated with dry mucous membranes and uncomfortable.  She has focal right lower quadrant tenderness with voluntary guarding.  She denies concern for sexually transmitted infection or pregnancy.  She denies any vaginal discharge.    Patient started on IV fluids for clinical evidence of dehydration and given pain and nausea medication.    CBC is reassuring without leukocytosis but with the focal right lower quadrant tenderness and borderline fever a CT scan was  ordered to rule out appendicitis.  Metabolic panel is reassuring with normal electrolytes, renal, and liver function.  She has normal anion gap and CO2.  Lipase is normal.  Urinalysis demonstrates ketones with a white blood cells and few bacteria.  Although she does not complain of urinary symptoms beyond frequent urination we will treat for UTI with ceftriaxone.    hCG is negative.  Viral swabs are all negative as well.    This did not reveal acute inflammatory process within the abdomen or pelvis although a 2.4 cm irregular right adnexal cyst with internal increased density consistent with a hemorrhagic cyst was noted.  An ultrasound was performed to rule out ovarian torsion and evaluate for septations or potential tubo-ovarian abscess.  This ultrasound did not identify the hemorrhagic cyst and it was felt to be an incidental finding.    Patient was reevaluated at bedside.  She has tolerated p.o. without difficulty and reports that she feels improved.  Plan will be for discharge with close outpatient follow-up.  I have given her a prescription for Keflex as well as Zofran and Bentyl.  She was also instructed to take Imodium for her diarrhea.  She was reminded to drink at least 8 ounces of clear liquids every hour to maintain her hydration.  She was ultimately discharged in good and stable condition with strict return precautions.    The patient will return for worsening symptoms and is stable at the time of discharge. The patient verbalizes understanding and will comply.    HYDRATION  HYDRATION: Based on the patient's presentation of Dehydration the patient was given IV fluids. IV Hydration was used because oral hydration was not adequate alone. Upon recheck following hydration, the patient was improved.    FINAL IMPRESSION  1. Nausea and vomiting, unspecified vomiting type  ondansetron (ZOFRAN ODT) 4 MG TABLET DISPERSIBLE      2. Diarrhea, unspecified type        3. Generalized abdominal pain  dicyclomine  (BENTYL) 20 MG Tab      4. Acute UTI  cephALEXin (KEFLEX) 500 MG Cap        Electronically signed by: Clemente Reyes M.D., 10/9/2022 9:33 AM

## 2022-10-09 NOTE — ED NOTES
Patient verbalized understanding to plan of care and discharge information. Patient in stable condition. Patient ambulated out of ED to person vehicle with stable gait with significant other.

## 2022-10-10 ENCOUNTER — HOSPITAL ENCOUNTER (EMERGENCY)
Facility: MEDICAL CENTER | Age: 27
End: 2022-10-10
Attending: EMERGENCY MEDICINE
Payer: MEDICAID

## 2022-10-10 VITALS
RESPIRATION RATE: 14 BRPM | OXYGEN SATURATION: 100 % | HEART RATE: 47 BPM | TEMPERATURE: 98.3 F | BODY MASS INDEX: 22.57 KG/M2 | SYSTOLIC BLOOD PRESSURE: 90 MMHG | DIASTOLIC BLOOD PRESSURE: 52 MMHG | WEIGHT: 132.2 LBS | HEIGHT: 64 IN

## 2022-10-10 DIAGNOSIS — R19.7 DIARRHEA, UNSPECIFIED TYPE: ICD-10-CM

## 2022-10-10 DIAGNOSIS — R11.2 NAUSEA AND VOMITING, UNSPECIFIED VOMITING TYPE: ICD-10-CM

## 2022-10-10 DIAGNOSIS — R10.84 GENERALIZED ABDOMINAL PAIN: ICD-10-CM

## 2022-10-10 LAB
ALBUMIN SERPL BCP-MCNC: 4.3 G/DL (ref 3.2–4.9)
ALBUMIN/GLOB SERPL: 1.5 G/DL
ALP SERPL-CCNC: 76 U/L (ref 30–99)
ALT SERPL-CCNC: 9 U/L (ref 2–50)
ANION GAP SERPL CALC-SCNC: 12 MMOL/L (ref 7–16)
AST SERPL-CCNC: 14 U/L (ref 12–45)
BASOPHILS # BLD AUTO: 0.2 % (ref 0–1.8)
BASOPHILS # BLD: 0.02 K/UL (ref 0–0.12)
BILIRUB SERPL-MCNC: 0.6 MG/DL (ref 0.1–1.5)
BUN SERPL-MCNC: 15 MG/DL (ref 8–22)
CALCIUM SERPL-MCNC: 9 MG/DL (ref 8.4–10.2)
CHLORIDE SERPL-SCNC: 107 MMOL/L (ref 96–112)
CO2 SERPL-SCNC: 20 MMOL/L (ref 20–33)
CREAT SERPL-MCNC: 0.82 MG/DL (ref 0.5–1.4)
EOSINOPHIL # BLD AUTO: 0 K/UL (ref 0–0.51)
EOSINOPHIL NFR BLD: 0 % (ref 0–6.9)
ERYTHROCYTE [DISTWIDTH] IN BLOOD BY AUTOMATED COUNT: 39.4 FL (ref 35.9–50)
GFR SERPLBLD CREATININE-BSD FMLA CKD-EPI: 101 ML/MIN/1.73 M 2
GLOBULIN SER CALC-MCNC: 2.9 G/DL (ref 1.9–3.5)
GLUCOSE SERPL-MCNC: 154 MG/DL (ref 65–99)
HCT VFR BLD AUTO: 42.4 % (ref 37–47)
HGB BLD-MCNC: 14.4 G/DL (ref 12–16)
IMM GRANULOCYTES # BLD AUTO: 0.03 K/UL (ref 0–0.11)
IMM GRANULOCYTES NFR BLD AUTO: 0.3 % (ref 0–0.9)
LIPASE SERPL-CCNC: 60 U/L (ref 7–58)
LYMPHOCYTES # BLD AUTO: 1.65 K/UL (ref 1–4.8)
LYMPHOCYTES NFR BLD: 17.4 % (ref 22–41)
MCH RBC QN AUTO: 31.7 PG (ref 27–33)
MCHC RBC AUTO-ENTMCNC: 34 G/DL (ref 33.6–35)
MCV RBC AUTO: 93.4 FL (ref 81.4–97.8)
MONOCYTES # BLD AUTO: 0.42 K/UL (ref 0–0.85)
MONOCYTES NFR BLD AUTO: 4.4 % (ref 0–13.4)
NEUTROPHILS # BLD AUTO: 7.35 K/UL (ref 2–7.15)
NEUTROPHILS NFR BLD: 77.7 % (ref 44–72)
NRBC # BLD AUTO: 0 K/UL
NRBC BLD-RTO: 0 /100 WBC
PLATELET # BLD AUTO: 207 K/UL (ref 164–446)
PMV BLD AUTO: 9.7 FL (ref 9–12.9)
POTASSIUM SERPL-SCNC: 3.8 MMOL/L (ref 3.6–5.5)
PROT SERPL-MCNC: 7.2 G/DL (ref 6–8.2)
RBC # BLD AUTO: 4.54 M/UL (ref 4.2–5.4)
SODIUM SERPL-SCNC: 139 MMOL/L (ref 135–145)
WBC # BLD AUTO: 9.5 K/UL (ref 4.8–10.8)

## 2022-10-10 PROCEDURE — 99285 EMERGENCY DEPT VISIT HI MDM: CPT

## 2022-10-10 PROCEDURE — 700102 HCHG RX REV CODE 250 W/ 637 OVERRIDE(OP): Performed by: EMERGENCY MEDICINE

## 2022-10-10 PROCEDURE — A9270 NON-COVERED ITEM OR SERVICE: HCPCS | Performed by: EMERGENCY MEDICINE

## 2022-10-10 PROCEDURE — 36415 COLL VENOUS BLD VENIPUNCTURE: CPT

## 2022-10-10 PROCEDURE — 96375 TX/PRO/DX INJ NEW DRUG ADDON: CPT

## 2022-10-10 PROCEDURE — 85025 COMPLETE CBC W/AUTO DIFF WBC: CPT

## 2022-10-10 PROCEDURE — 96374 THER/PROPH/DIAG INJ IV PUSH: CPT

## 2022-10-10 PROCEDURE — 83690 ASSAY OF LIPASE: CPT

## 2022-10-10 PROCEDURE — 700105 HCHG RX REV CODE 258: Performed by: EMERGENCY MEDICINE

## 2022-10-10 PROCEDURE — 700111 HCHG RX REV CODE 636 W/ 250 OVERRIDE (IP): Performed by: EMERGENCY MEDICINE

## 2022-10-10 PROCEDURE — 80053 COMPREHEN METABOLIC PANEL: CPT

## 2022-10-10 RX ORDER — DIPHENHYDRAMINE HYDROCHLORIDE 50 MG/ML
25 INJECTION INTRAMUSCULAR; INTRAVENOUS ONCE
Status: COMPLETED | OUTPATIENT
Start: 2022-10-10 | End: 2022-10-10

## 2022-10-10 RX ORDER — METOCLOPRAMIDE 10 MG/1
10 TABLET ORAL 4 TIMES DAILY PRN
Qty: 60 TABLET | Refills: 0 | Status: SHIPPED | OUTPATIENT
Start: 2022-10-10 | End: 2023-03-11

## 2022-10-10 RX ORDER — NAPROXEN 500 MG/1
500 TABLET ORAL
Qty: 60 TABLET | Refills: 0 | Status: SHIPPED | OUTPATIENT
Start: 2022-10-10 | End: 2023-03-11

## 2022-10-10 RX ORDER — DICYCLOMINE HCL 20 MG
20 TABLET ORAL ONCE
Status: COMPLETED | OUTPATIENT
Start: 2022-10-10 | End: 2022-10-10

## 2022-10-10 RX ORDER — KETOROLAC TROMETHAMINE 30 MG/ML
30 INJECTION, SOLUTION INTRAMUSCULAR; INTRAVENOUS ONCE
Status: COMPLETED | OUTPATIENT
Start: 2022-10-10 | End: 2022-10-10

## 2022-10-10 RX ORDER — SODIUM CHLORIDE 9 MG/ML
1000 INJECTION, SOLUTION INTRAVENOUS ONCE
Status: COMPLETED | OUTPATIENT
Start: 2022-10-10 | End: 2022-10-10

## 2022-10-10 RX ORDER — METOCLOPRAMIDE HYDROCHLORIDE 5 MG/ML
10 INJECTION INTRAMUSCULAR; INTRAVENOUS ONCE
Status: COMPLETED | OUTPATIENT
Start: 2022-10-10 | End: 2022-10-10

## 2022-10-10 RX ADMIN — DIPHENHYDRAMINE HYDROCHLORIDE 25 MG: 50 INJECTION INTRAMUSCULAR; INTRAVENOUS at 10:18

## 2022-10-10 RX ADMIN — SODIUM CHLORIDE 1000 ML: 9 INJECTION, SOLUTION INTRAVENOUS at 10:19

## 2022-10-10 RX ADMIN — DICYCLOMINE HYDROCHLORIDE 20 MG: 20 TABLET ORAL at 10:18

## 2022-10-10 RX ADMIN — KETOROLAC TROMETHAMINE 30 MG: 30 INJECTION, SOLUTION INTRAMUSCULAR; INTRAVENOUS at 10:18

## 2022-10-10 RX ADMIN — METOCLOPRAMIDE 10 MG: 5 INJECTION, SOLUTION INTRAMUSCULAR; INTRAVENOUS at 10:18

## 2022-10-10 ASSESSMENT — ENCOUNTER SYMPTOMS
ABDOMINAL PAIN: 1
DIZZINESS: 0
VOMITING: 1
SHORTNESS OF BREATH: 0
NAUSEA: 1

## 2022-10-10 ASSESSMENT — FIBROSIS 4 INDEX: FIB4 SCORE: 0.5

## 2022-10-10 ASSESSMENT — PAIN DESCRIPTION - PAIN TYPE: TYPE: ACUTE PAIN

## 2022-10-10 NOTE — ED NOTES
Patient is stable for discharge at this time, anticipatory guidance provided, close follow-up is encouraged, and ED return instructions have been detailed. Patient is both agreeable to the disposition and plan and discharged home in ambulatory state.     Rx education provided, Pt verbalized understanding;

## 2022-10-10 NOTE — ED TRIAGE NOTES
"Chief Complaint   Patient presents with    Abdominal Pain     X3 days, +N/V/D     /73   Pulse 65   Temp 37.5 °C (99.5 °F) (Temporal)   Resp 12   Ht 1.626 m (5' 4\")   Wt 60 kg (132 lb 3.2 oz)   SpO2 100%   BMI 22.69 kg/m²     "

## 2022-10-10 NOTE — Clinical Note
Mary Carmen Richard was seen and treated in our emergency department on 10/10/2022.  She may return to work on 10/12/2022.       If you have any questions or concerns, please don't hesitate to call.      Tenisha Skinner M.D.

## 2022-10-10 NOTE — ED PROVIDER NOTES
ED Provider Note    Primary care provider: KRISTA Lamar  Means of arrival: private vehicle  History obtained from: patient  History limited by: none    CHIEF COMPLAINT  Chief Complaint   Patient presents with    Abdominal Pain     X3 days, +N/V/D       HPI  Mary Carmen Richard is a 27 y.o. female who presents to the Emergency Department for evaluation of abdominal pain, nausea, vomiting and diarrhea.  Patient was seen in the emergency department yesterday for similar symptoms.  She reports that she has had abdominal pain with nausea, vomiting and diarrhea for 3 days.  She has had multiple episodes of nonbloody diarrhea and nonbloody emesis.  She reports associated generalized abdominal cramping pain that is severe.  Per record review labs yesterday were unremarkable.  Urinalysis notable for start of UTI for which she was started on Keflex.  CT of the abdomen unremarkable except for ovarian cyst, pelvic ultrasound was performed which was unremarkable.  She was prescribed Zofran, Bentyl and Keflex.  Patient reports she was unable to  her prescriptions as the pharmacy closed early and was up all night with symptoms and therefore came here for repeat evaluation.  She denies  chills, chest pain, cough.  Her family member is ill with symptoms of cough and fevers.    REVIEW OF SYSTEMS  Review of Systems   Constitutional:  Positive for malaise/fatigue.   Respiratory:  Negative for shortness of breath.    Cardiovascular:  Negative for chest pain.   Gastrointestinal:  Positive for abdominal pain, nausea and vomiting.   Genitourinary:  Negative for dysuria.   Neurological:  Negative for dizziness.   All other systems reviewed and are negative.      PAST MEDICAL HISTORY   has a past medical history of Anxiety, Asthma, Depression, and Ovarian cyst.    SURGICAL HISTORY   has a past surgical history that includes tonsillectomy (2000); cervical cerclage (2/20/2015); other; primary c section (3/28/2015); and  "gastric sleeve laparoscopy (2/13/2018).    SOCIAL HISTORY  Social History     Tobacco Use    Smoking status: Never    Smokeless tobacco: Never    Tobacco comments:     vape    Vaping Use    Vaping Use: Every day   Substance Use Topics    Alcohol use: No    Drug use: No      Social History     Substance and Sexual Activity   Drug Use No       FAMILY HISTORY  Family History   Problem Relation Age of Onset    No Known Problems Daughter     No Known Problems Daughter     Diabetes Father     Diabetes Paternal Uncle     Cancer Maternal Grandfather        CURRENT MEDICATIONS  Home Medications    **Home medications have not yet been reviewed for this encounter**         ALLERGIES  No Known Allergies    PHYSICAL EXAM  VITAL SIGNS: /73   Pulse 65   Temp 37.5 °C (99.5 °F) (Temporal)   Resp 12   Ht 1.626 m (5' 4\")   Wt 60 kg (132 lb 3.2 oz)   SpO2 100%   BMI 22.69 kg/m²   Vitals reviewed by myself.  Physical Exam  Nursing note and vitals reviewed.  Constitutional: Well-developed and well-nourished. No acute distress.   HENT: Head is normocephalic and atraumatic.  Eyes: extra-ocular movements intact  Cardiovascular: Regular rate and  regular rhythm. No murmur heard.  Pulmonary/Chest: Breath sounds normal. No wheezes or rales.   Abdominal: Soft and non-tender. No distention.  Negative Skinner sign, negative McBurney's point tenderness, no rebound or guarding  Musculoskeletal: Extremities exhibit normal range of motion without edema or tenderness.  Patient ambulates with a steady gait  Neurological: Awake and alert  Skin: Skin is warm and dry. No rash.        DIAGNOSTIC STUDIES /  LABS  Labs Reviewed   CBC WITH DIFFERENTIAL - Abnormal; Notable for the following components:       Result Value    Neutrophils-Polys 77.70 (*)     Lymphocytes 17.40 (*)     Neutrophils (Absolute) 7.35 (*)     All other components within normal limits   COMP METABOLIC PANEL - Abnormal; Notable for the following components:    Glucose 154 (*)  "    All other components within normal limits   LIPASE - Abnormal; Notable for the following components:    Lipase 60 (*)     All other components within normal limits   ESTIMATED GFR   CULTURE STOOL       All labs reviewed by me.      COURSE & MEDICAL DECISION MAKING  Nursing notes, VS, PMSFHx reviewed in chart.    Patient is a 27-year-old female who comes in for evaluation of nausea, vomiting and diarrhea.  Patient's symptoms seem most consistent with viral syndrome versus viral gastroenteritis.  She had thorough work-up yesterday which was unremarkable.  Abdomen is benign, no tenderness to palpation, I feel appendicitis is less likely.  Patient's vitals are within normal limits.  I will repeat labs to assess for dehydration and renal dysfunction.  If patient is able to provide stool sample we will send stool culture.      Patient's initial vitals are within normal limits and she is well-appearing on exam.  She is treated with Benadryl, Reglan, Toradol, Bentyl and IV fluids.  After treatment patient is feeling improved.  She has had no episodes of emesis in the emergency department.  Labs returned and are unremarkable.  Therefore patient is reassured and advised on symptomatic management of likely viral syndrome.  She is advised to  her prescriptions that she was prescribed yesterday and will also be prescribed Reglan and naproxen for ongoing management of her symptoms.  She is then given strict return precautions and discharged in stable condition.    HYDRATION: Based on the patient's presentation of Acute Vomiting the patient was given IV fluids. IV Hydration was used because oral hydration was not adequate alone. Upon recheck following hydration, the patient was improved.        FINAL IMPRESSION  1. Generalized abdominal pain    2. Nausea and vomiting, unspecified vomiting type    3. Diarrhea, unspecified type

## 2022-10-11 LAB
BACTERIA UR CULT: NORMAL
SIGNIFICANT IND 70042: NORMAL
SITE SITE: NORMAL
SOURCE SOURCE: NORMAL

## 2023-02-13 ENCOUNTER — HOSPITAL ENCOUNTER (EMERGENCY)
Facility: MEDICAL CENTER | Age: 28
End: 2023-02-13
Attending: EMERGENCY MEDICINE
Payer: COMMERCIAL

## 2023-02-13 VITALS
RESPIRATION RATE: 19 BRPM | DIASTOLIC BLOOD PRESSURE: 72 MMHG | BODY MASS INDEX: 25.03 KG/M2 | HEART RATE: 56 BPM | OXYGEN SATURATION: 100 % | TEMPERATURE: 98.1 F | WEIGHT: 146.61 LBS | SYSTOLIC BLOOD PRESSURE: 119 MMHG | HEIGHT: 64 IN

## 2023-02-13 DIAGNOSIS — R11.0 NAUSEA: ICD-10-CM

## 2023-02-13 DIAGNOSIS — R09.89 RUNNY NOSE: ICD-10-CM

## 2023-02-13 DIAGNOSIS — R19.7 DIARRHEA, UNSPECIFIED TYPE: ICD-10-CM

## 2023-02-13 LAB
FLUAV RNA SPEC QL NAA+PROBE: NEGATIVE
FLUBV RNA SPEC QL NAA+PROBE: NEGATIVE
RSV RNA SPEC QL NAA+PROBE: NEGATIVE
SARS-COV-2 RNA RESP QL NAA+PROBE: NOTDETECTED
SPECIMEN SOURCE: NORMAL

## 2023-02-13 PROCEDURE — C9803 HOPD COVID-19 SPEC COLLECT: HCPCS | Performed by: EMERGENCY MEDICINE

## 2023-02-13 PROCEDURE — 0241U HCHG SARS-COV-2 COVID-19 NFCT DS RESP RNA 4 TRGT MIC: CPT

## 2023-02-13 PROCEDURE — 99283 EMERGENCY DEPT VISIT LOW MDM: CPT

## 2023-02-13 RX ORDER — OXYCODONE HYDROCHLORIDE AND ACETAMINOPHEN 5; 325 MG/1; MG/1
1 TABLET ORAL EVERY 4 HOURS PRN
Qty: 20 TABLET | Refills: 0 | Status: SHIPPED | OUTPATIENT
Start: 2023-02-13 | End: 2023-02-18

## 2023-02-13 RX ORDER — ONDANSETRON 4 MG/1
4 TABLET, ORALLY DISINTEGRATING ORAL EVERY 8 HOURS PRN
Qty: 20 TABLET | Refills: 0 | Status: SHIPPED | OUTPATIENT
Start: 2023-02-13 | End: 2023-09-12

## 2023-02-13 ASSESSMENT — FIBROSIS 4 INDEX: FIB4 SCORE: .6086956521739130435

## 2023-02-13 NOTE — ED NOTES
Pt given d/c paperwork and RX p/u information. Pt verbalized understanding all information given. Pt ambulated out of the ER w/o difficulty w/ family

## 2023-02-13 NOTE — ED TRIAGE NOTES
Chief Complaint   Patient presents with    Fatigue    Head Ache     Pt complains of frontal HA, fatigue and diarrhea for the past 3 days. Pt checking in with her daughters.

## 2023-02-13 NOTE — ED PROVIDER NOTES
"ED Provider Note    CHIEF COMPLAINT  Chief Complaint   Patient presents with    Fatigue    Head Ache       EXTERNAL RECORDS REVIEWED  PDMP the patient is on no recent controlled substance prescriptions    HPI/ROS  LIMITATION TO HISTORY   Select: : None  OUTSIDE HISTORIAN(S):  Family daughters and     Mary Carmen Richard is a 28 y.o. female who presents with no significant past medical history, she presents with diarrhea, runny nose, body aches that been going on for 2 to 3 days.  She has had no vomiting.  She has had no blood or black in her stool.  She presents with her twin daughters who have had the same symptoms for 7 days.    PAST MEDICAL HISTORY   has a past medical history of Anxiety, Asthma, Depression, and Ovarian cyst.    SURGICAL HISTORY   has a past surgical history that includes tonsillectomy (2000); cervical cerclage (2/20/2015); other; primary c section (3/28/2015); and gastric sleeve laparoscopy (2/13/2018).    FAMILY HISTORY  Family History   Problem Relation Age of Onset    No Known Problems Daughter     No Known Problems Daughter     Diabetes Father     Diabetes Paternal Uncle     Cancer Maternal Grandfather        SOCIAL HISTORY  Social History     Tobacco Use    Smoking status: Never    Smokeless tobacco: Never    Tobacco comments:     vape    Vaping Use    Vaping Use: Every day   Substance and Sexual Activity    Alcohol use: No    Drug use: No    Sexual activity: Yes     Partners: Male     Birth control/protection: Other-See Comments     Comment: none       CURRENT MEDICATIONS  The patient denies      ALLERGIES  No Known Allergies    PHYSICAL EXAM  VITAL SIGNS: /73   Pulse 77   Temp 36.6 °C (97.9 °F) (Temporal)   Resp 18   Ht 1.626 m (5' 4\")   Wt 66.5 kg (146 lb 9.7 oz)   SpO2 99%   BMI 25.16 kg/m²    Constitutional: Alert in no apparent distress.  HENT: No signs of trauma, Bilateral external ears normal, Nose normal.   Eyes: Pupils are equal and reactive, Conjunctiva " normal, Non-icteric.   Neck: Normal range of motion, No tenderness, Supple, No stridor.   Lymphatic: No lymphadenopathy noted.   Cardiovascular: Regular rate and rhythm, no murmurs.   Thorax & Lungs: Normal breath sounds, No respiratory distress, No wheezing, No chest tenderness.   Abdomen: Bowel sounds normal, Soft, No tenderness, No peritoneal signs, No masses, No pulsatile masses.   Skin: Warm, Dry, No erythema, No rash.   Back: No bony tenderness, No CVA tenderness.   Extremities: Intact distal pulses, No edema, No tenderness, No cyanosis.sign.   Musculoskeletal: Good range of motion in all major joints. No tenderness to palpation or major deformities noted.   Neurologic: Alert , Normal motor function, Normal sensory function, No focal deficits noted.   Psychiatric: Affect normal, Judgment normal, Mood normal.       DIAGNOSTIC STUDIES / PROCEDURES    LABS  Labs Reviewed   COV-2, FLU A/B, AND RSV BY PCR (BDNA)     Pending, patient will check results on MyChart      COURSE & MEDICAL DECISION MAKING    ED Observation Status? No; Patient does not meet criteria for ED Observation.     INITIAL ASSESSMENT, COURSE AND PLAN  Care Narrative: The patient presents with viral like symptoms.  I have sent a screening test for flu, COVID, RSV.  If this test is positive she will check her phone for MyChart but she is young and healthy and there is no further medications other than supportive care.            ADDITIONAL PROBLEM LIST  Asthma, depression  DISPOSITION AND DISCUSSIONS          Escalation of care considered, and ultimately not performed:acute inpatient care management, however at this time, the patient is most appropriate for outpatient management patient's physical exam does not indicate admission.    Barriers to care at this time, including but not limited to:  None .     Decision tools and prescription drugs considered including, but not limited to:  None .    I reviewed prescription monitoring program for  patient's narcotic use before prescribing a scheduled drug.The patient will not drink alcohol nor drive with prescribed medications. The patient will return for new or worsening symptoms and is stable at the time of discharge.    The patient is referred to a primary physician for blood pressure management, diabetic screening, and for all other preventative health concerns.    In prescribing controlled substances to this patient, I certify that I have obtained and reviewed the medical history of Mary Carmen Richard. I have also made a good chet effort to obtain applicable records from other providers who have treated the patient and records did not demonstrate any increased risk of substance abuse that would prevent me from prescribing controlled substances.     I have conducted a physical exam and documented it. I have reviewed Ms. Richard’s prescription history as maintained by the [place] Prescription Monitoring Program.     I have assessed the patient’s risk for abuse, dependency, and addiction using the validated Opioid Risk Tool available at https://www.mdcalc.com/bumhbl-dose-izcu-ort-narcotic-abuse.     Given the above, I believe the benefits of controlled substance therapy outweigh the risks. The reasons for prescribing controlled substances include non-narcotic, oral analgesic alternatives have been inadequate for pain control. Accordingly, I have discussed the risk and benefits, treatment plan, and alternative therapies with the patient.     DISPOSITION:  Patient will be discharged home in stable condition.    FOLLOW UP:  [facility], Emergency Dept  [address]  [place] [id number] [phone]  Follow up  If symptoms worsen    [facility] Naranjo A.P[facility]R.N[facility]  [address] [id number] [phone]    Follow up  As needed      OUTPATIENT MEDICATIONS:  New Prescriptions    ONDANSETRON (ZOFRAN ODT) 4 MG TABLET DISPERSIBLE    Take 1 Tablet by mouth every 8  hours as needed for Nausea/Vomiting.    OXYCODONE-ACETAMINOPHEN (PERCOCET) 5-325 MG TAB    Take 1 Tablet by mouth every four hours as needed (pain) for up to 5 days. No driving, no drinking alcohol           FINAL DIAGNOSIS  1. Runny nose    2. Diarrhea, unspecified type    3. Nausea           Electronically signed by: Jr Edwards M.D., 2/13/2023 10:27 AM

## 2023-03-11 ENCOUNTER — APPOINTMENT (OUTPATIENT)
Dept: RADIOLOGY | Facility: MEDICAL CENTER | Age: 28
End: 2023-03-11
Attending: EMERGENCY MEDICINE
Payer: COMMERCIAL

## 2023-03-11 ENCOUNTER — HOSPITAL ENCOUNTER (EMERGENCY)
Facility: MEDICAL CENTER | Age: 28
End: 2023-03-11
Attending: EMERGENCY MEDICINE
Payer: COMMERCIAL

## 2023-03-11 VITALS
RESPIRATION RATE: 16 BRPM | WEIGHT: 144.4 LBS | HEIGHT: 64 IN | BODY MASS INDEX: 24.65 KG/M2 | DIASTOLIC BLOOD PRESSURE: 65 MMHG | HEART RATE: 63 BPM | OXYGEN SATURATION: 100 % | SYSTOLIC BLOOD PRESSURE: 122 MMHG | TEMPERATURE: 98.4 F

## 2023-03-11 DIAGNOSIS — R19.7 NAUSEA VOMITING AND DIARRHEA: ICD-10-CM

## 2023-03-11 DIAGNOSIS — R10.33 PERIUMBILICAL ABDOMINAL PAIN: ICD-10-CM

## 2023-03-11 DIAGNOSIS — I95.1 ORTHOSTATIC SYNCOPE: ICD-10-CM

## 2023-03-11 DIAGNOSIS — R11.2 NAUSEA VOMITING AND DIARRHEA: ICD-10-CM

## 2023-03-11 LAB
ALBUMIN SERPL BCP-MCNC: 4.3 G/DL (ref 3.2–4.9)
ALBUMIN/GLOB SERPL: 1.3 G/DL
ALP SERPL-CCNC: 70 U/L (ref 30–99)
ALT SERPL-CCNC: 17 U/L (ref 2–50)
ANION GAP SERPL CALC-SCNC: 13 MMOL/L (ref 7–16)
AST SERPL-CCNC: 16 U/L (ref 12–45)
BASOPHILS # BLD AUTO: 0.3 % (ref 0–1.8)
BASOPHILS # BLD: 0.03 K/UL (ref 0–0.12)
BILIRUB SERPL-MCNC: 0.7 MG/DL (ref 0.1–1.5)
BUN SERPL-MCNC: 16 MG/DL (ref 8–22)
CALCIUM ALBUM COR SERPL-MCNC: 8.8 MG/DL (ref 8.5–10.5)
CALCIUM SERPL-MCNC: 9 MG/DL (ref 8.4–10.2)
CHLORIDE SERPL-SCNC: 102 MMOL/L (ref 96–112)
CO2 SERPL-SCNC: 23 MMOL/L (ref 20–33)
CREAT SERPL-MCNC: 0.83 MG/DL (ref 0.5–1.4)
EKG IMPRESSION: NORMAL
EOSINOPHIL # BLD AUTO: 0 K/UL (ref 0–0.51)
EOSINOPHIL NFR BLD: 0 % (ref 0–6.9)
ERYTHROCYTE [DISTWIDTH] IN BLOOD BY AUTOMATED COUNT: 38.4 FL (ref 35.9–50)
GFR SERPLBLD CREATININE-BSD FMLA CKD-EPI: 99 ML/MIN/1.73 M 2
GLOBULIN SER CALC-MCNC: 3.2 G/DL (ref 1.9–3.5)
GLUCOSE SERPL-MCNC: 87 MG/DL (ref 65–99)
HCG SERPL QL: NEGATIVE
HCT VFR BLD AUTO: 45.2 % (ref 37–47)
HGB BLD-MCNC: 15.3 G/DL (ref 12–16)
IMM GRANULOCYTES # BLD AUTO: 0.04 K/UL (ref 0–0.11)
IMM GRANULOCYTES NFR BLD AUTO: 0.4 % (ref 0–0.9)
LIPASE SERPL-CCNC: 48 U/L (ref 7–58)
LYMPHOCYTES # BLD AUTO: 1.54 K/UL (ref 1–4.8)
LYMPHOCYTES NFR BLD: 16.8 % (ref 22–41)
MCH RBC QN AUTO: 30.8 PG (ref 27–33)
MCHC RBC AUTO-ENTMCNC: 33.8 G/DL (ref 33.6–35)
MCV RBC AUTO: 91.1 FL (ref 81.4–97.8)
MONOCYTES # BLD AUTO: 0.37 K/UL (ref 0–0.85)
MONOCYTES NFR BLD AUTO: 4 % (ref 0–13.4)
NEUTROPHILS # BLD AUTO: 7.17 K/UL (ref 2–7.15)
NEUTROPHILS NFR BLD: 78.5 % (ref 44–72)
NRBC # BLD AUTO: 0 K/UL
NRBC BLD-RTO: 0 /100 WBC
PLATELET # BLD AUTO: 294 K/UL (ref 164–446)
PMV BLD AUTO: 8.4 FL (ref 9–12.9)
POTASSIUM SERPL-SCNC: 3.8 MMOL/L (ref 3.6–5.5)
PROT SERPL-MCNC: 7.5 G/DL (ref 6–8.2)
RBC # BLD AUTO: 4.96 M/UL (ref 4.2–5.4)
SODIUM SERPL-SCNC: 138 MMOL/L (ref 135–145)
TROPONIN T SERPL-MCNC: <6 NG/L (ref 6–19)
TSH SERPL DL<=0.005 MIU/L-ACNC: 0.48 UIU/ML (ref 0.38–5.33)
WBC # BLD AUTO: 9.2 K/UL (ref 4.8–10.8)

## 2023-03-11 PROCEDURE — 99285 EMERGENCY DEPT VISIT HI MDM: CPT

## 2023-03-11 PROCEDURE — 700117 HCHG RX CONTRAST REV CODE 255: Performed by: EMERGENCY MEDICINE

## 2023-03-11 PROCEDURE — 94760 N-INVAS EAR/PLS OXIMETRY 1: CPT

## 2023-03-11 PROCEDURE — 85025 COMPLETE CBC W/AUTO DIFF WBC: CPT

## 2023-03-11 PROCEDURE — 700111 HCHG RX REV CODE 636 W/ 250 OVERRIDE (IP): Performed by: EMERGENCY MEDICINE

## 2023-03-11 PROCEDURE — 84703 CHORIONIC GONADOTROPIN ASSAY: CPT

## 2023-03-11 PROCEDURE — 84484 ASSAY OF TROPONIN QUANT: CPT

## 2023-03-11 PROCEDURE — 74177 CT ABD & PELVIS W/CONTRAST: CPT

## 2023-03-11 PROCEDURE — 84443 ASSAY THYROID STIM HORMONE: CPT

## 2023-03-11 PROCEDURE — 96374 THER/PROPH/DIAG INJ IV PUSH: CPT | Mod: XU

## 2023-03-11 PROCEDURE — 700105 HCHG RX REV CODE 258: Performed by: EMERGENCY MEDICINE

## 2023-03-11 PROCEDURE — 80053 COMPREHEN METABOLIC PANEL: CPT

## 2023-03-11 PROCEDURE — 96375 TX/PRO/DX INJ NEW DRUG ADDON: CPT

## 2023-03-11 PROCEDURE — 36415 COLL VENOUS BLD VENIPUNCTURE: CPT

## 2023-03-11 PROCEDURE — 83690 ASSAY OF LIPASE: CPT

## 2023-03-11 PROCEDURE — 93005 ELECTROCARDIOGRAM TRACING: CPT | Performed by: EMERGENCY MEDICINE

## 2023-03-11 RX ORDER — HYDROMORPHONE HYDROCHLORIDE 1 MG/ML
0.5 INJECTION, SOLUTION INTRAMUSCULAR; INTRAVENOUS; SUBCUTANEOUS
Status: DISCONTINUED | OUTPATIENT
Start: 2023-03-11 | End: 2023-03-11 | Stop reason: HOSPADM

## 2023-03-11 RX ORDER — DIPHENHYDRAMINE HYDROCHLORIDE 50 MG/ML
25 INJECTION INTRAMUSCULAR; INTRAVENOUS ONCE
Status: COMPLETED | OUTPATIENT
Start: 2023-03-11 | End: 2023-03-11

## 2023-03-11 RX ORDER — METOCLOPRAMIDE HYDROCHLORIDE 5 MG/ML
10 INJECTION INTRAMUSCULAR; INTRAVENOUS ONCE
Status: COMPLETED | OUTPATIENT
Start: 2023-03-11 | End: 2023-03-11

## 2023-03-11 RX ORDER — SODIUM CHLORIDE 9 MG/ML
1000 INJECTION, SOLUTION INTRAVENOUS ONCE
Status: COMPLETED | OUTPATIENT
Start: 2023-03-11 | End: 2023-03-11

## 2023-03-11 RX ORDER — PROMETHAZINE HYDROCHLORIDE 25 MG/1
25 TABLET ORAL EVERY 6 HOURS PRN
Qty: 20 TABLET | Refills: 0 | Status: SHIPPED | OUTPATIENT
Start: 2023-03-11 | End: 2023-09-12

## 2023-03-11 RX ORDER — PROMETHAZINE HYDROCHLORIDE 25 MG/1
25 SUPPOSITORY RECTAL EVERY 6 HOURS PRN
Qty: 10 SUPPOSITORY | Refills: 0 | Status: SHIPPED | OUTPATIENT
Start: 2023-03-11 | End: 2023-09-12

## 2023-03-11 RX ORDER — KETOROLAC TROMETHAMINE 30 MG/ML
30 INJECTION, SOLUTION INTRAMUSCULAR; INTRAVENOUS ONCE
Status: COMPLETED | OUTPATIENT
Start: 2023-03-11 | End: 2023-03-11

## 2023-03-11 RX ORDER — ONDANSETRON 2 MG/ML
4 INJECTION INTRAMUSCULAR; INTRAVENOUS ONCE
Status: COMPLETED | OUTPATIENT
Start: 2023-03-11 | End: 2023-03-11

## 2023-03-11 RX ORDER — DICYCLOMINE HCL 20 MG
20 TABLET ORAL EVERY 6 HOURS PRN
Qty: 20 TABLET | Refills: 0 | Status: SHIPPED | OUTPATIENT
Start: 2023-03-11 | End: 2023-09-12

## 2023-03-11 RX ADMIN — ONDANSETRON 4 MG: 2 INJECTION INTRAMUSCULAR; INTRAVENOUS at 09:44

## 2023-03-11 RX ADMIN — HYDROMORPHONE HYDROCHLORIDE 0.5 MG: 1 INJECTION, SOLUTION INTRAMUSCULAR; INTRAVENOUS; SUBCUTANEOUS at 10:06

## 2023-03-11 RX ADMIN — SODIUM CHLORIDE 1000 ML: 9 INJECTION, SOLUTION INTRAVENOUS at 09:45

## 2023-03-11 RX ADMIN — IOHEXOL 100 ML: 350 INJECTION, SOLUTION INTRAVENOUS at 10:43

## 2023-03-11 RX ADMIN — DIPHENHYDRAMINE HYDROCHLORIDE 25 MG: 50 INJECTION INTRAMUSCULAR; INTRAVENOUS at 11:28

## 2023-03-11 RX ADMIN — METOCLOPRAMIDE 10 MG: 5 INJECTION, SOLUTION INTRAMUSCULAR; INTRAVENOUS at 11:31

## 2023-03-11 RX ADMIN — KETOROLAC TROMETHAMINE 30 MG: 30 INJECTION, SOLUTION INTRAMUSCULAR; INTRAVENOUS at 11:28

## 2023-03-11 ASSESSMENT — PAIN DESCRIPTION - PAIN TYPE: TYPE: ACUTE PAIN

## 2023-03-11 ASSESSMENT — FIBROSIS 4 INDEX: FIB4 SCORE: .6086956521739130435

## 2023-03-11 NOTE — ED NOTES
Orthostatic VS.   Supine /64 Pulse 67 O2 98%  Sitting /67 Pulse 62 O2 99%  Standing x1 minute /62 Pulse 105 O2 98%

## 2023-03-11 NOTE — ED NOTES
"Pt states they have had nausea vomitting and diarrhea with dark black stool and blood for the past 4 days. Pt states they also have been \"passing out\" for the past four days with the last episode at approximately 03:00 this morning.   "

## 2023-03-11 NOTE — ED TRIAGE NOTES
"Chief Complaint   Patient presents with    Nausea/Vomiting/Diarrhea     Started 4-days ago, Pt stated that she hasn't been able to keep anything down; Pt also stated that her stool was dark red in appears - Pt did state that she took some Pepto however it was after she noted the dark red appearance to her stool, 8-episodes of diarrhea today;    Syncope     Pt stated that she \"passed out\" an unknown number of times over the last 5-days;      ED Triage Vitals [03/11/23 0904]   Enc Vitals Group      Blood Pressure 117/56      Pulse 81      Respiration 16      Temperature 36.7 °C (98 °F)      Temp src Temporal      Pulse Oximetry 98 %      Weight 65.5 kg (144 lb 6.4 oz)      Height 1.626 m (5' 4\")      Head Circumference       Peak Flow       Pain Score       Pain Loc       Pain Edu?       Excl. in GC?       "

## 2023-09-12 ENCOUNTER — HOSPITAL ENCOUNTER (EMERGENCY)
Facility: MEDICAL CENTER | Age: 28
End: 2023-09-12
Attending: EMERGENCY MEDICINE
Payer: COMMERCIAL

## 2023-09-12 ENCOUNTER — APPOINTMENT (OUTPATIENT)
Dept: RADIOLOGY | Facility: MEDICAL CENTER | Age: 28
End: 2023-09-12
Attending: EMERGENCY MEDICINE
Payer: COMMERCIAL

## 2023-09-12 VITALS
BODY MASS INDEX: 30.15 KG/M2 | SYSTOLIC BLOOD PRESSURE: 91 MMHG | TEMPERATURE: 97.8 F | OXYGEN SATURATION: 100 % | HEIGHT: 64 IN | RESPIRATION RATE: 14 BRPM | DIASTOLIC BLOOD PRESSURE: 59 MMHG | HEART RATE: 66 BPM | WEIGHT: 176.59 LBS

## 2023-09-12 DIAGNOSIS — S30.0XXA LUMBAR CONTUSION, INITIAL ENCOUNTER: ICD-10-CM

## 2023-09-12 PROCEDURE — A9270 NON-COVERED ITEM OR SERVICE: HCPCS | Performed by: EMERGENCY MEDICINE

## 2023-09-12 PROCEDURE — 72131 CT LUMBAR SPINE W/O DYE: CPT

## 2023-09-12 PROCEDURE — 99284 EMERGENCY DEPT VISIT MOD MDM: CPT

## 2023-09-12 PROCEDURE — 700102 HCHG RX REV CODE 250 W/ 637 OVERRIDE(OP): Performed by: EMERGENCY MEDICINE

## 2023-09-12 RX ORDER — OXYCODONE HYDROCHLORIDE AND ACETAMINOPHEN 5; 325 MG/1; MG/1
1 TABLET ORAL ONCE
Status: COMPLETED | OUTPATIENT
Start: 2023-09-12 | End: 2023-09-12

## 2023-09-12 RX ORDER — PREDNISONE 20 MG/1
60 TABLET ORAL DAILY
Qty: 9 TABLET | Refills: 0 | Status: SHIPPED | OUTPATIENT
Start: 2023-09-12 | End: 2023-09-15

## 2023-09-12 RX ORDER — OXYCODONE HYDROCHLORIDE AND ACETAMINOPHEN 5; 325 MG/1; MG/1
1 TABLET ORAL EVERY 8 HOURS PRN
Qty: 5 TABLET | Refills: 0 | Status: SHIPPED | OUTPATIENT
Start: 2023-09-12 | End: 2023-09-15

## 2023-09-12 RX ORDER — IBUPROFEN 600 MG/1
600 TABLET ORAL ONCE
Status: COMPLETED | OUTPATIENT
Start: 2023-09-12 | End: 2023-09-12

## 2023-09-12 RX ADMIN — OXYCODONE AND ACETAMINOPHEN 1 TABLET: 325; 5 TABLET ORAL at 10:07

## 2023-09-12 RX ADMIN — IBUPROFEN 600 MG: 600 TABLET, FILM COATED ORAL at 10:13

## 2023-09-12 ASSESSMENT — FIBROSIS 4 INDEX: FIB4 SCORE: 0.36

## 2023-09-12 NOTE — ED TRIAGE NOTES
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  Pt c/o increasing pain w/ sitting and standing and difficulty moving.    "

## 2023-09-12 NOTE — ED PROVIDER NOTES
ED Provider Note    CHIEF COMPLAINT  Chief Complaint   Patient presents with    Low Back Pain     Pt states was showing a cheer move, landed on tailbone on concrete two days ago  C/o increasing pain and difficulty moving       HPI/ROS    Mary Carmen Cristina Richard is a 28 y.o. female who presents with low back pain.  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  She landed on her lumbar sacral region where she has severe pain.  This happened 2 days ago.  She states she has significant pain whenever she attempts to move her lower extremities.  She does not have any paresthesias nor loss of function to her lower extremities.  She has not had any change in bowel or bladder function.  She is unaware of any other injuries.    PAST MEDICAL HISTORY   has a past medical history of Anxiety, Asthma, Depression, and Ovarian cyst.    SURGICAL HISTORY   has a past surgical history that includes tonsillectomy (2000); cervical cerclage (2/20/2015); other; primary c section (3/28/2015); and gastric sleeve laparoscopy (2/13/2018).    FAMILY HISTORY  Family History   Problem Relation Age of Onset    No Known Problems Daughter     No Known Problems Daughter     Diabetes Father     Diabetes Paternal Uncle     Cancer Maternal Grandfather        SOCIAL HISTORY  Social History     Tobacco Use    Smoking status: Never    Smokeless tobacco: Never    Tobacco comments:     vape    Vaping Use    Vaping Use: Every day   Substance and Sexual Activity    Alcohol use: No    Drug use: No    Sexual activity: Yes     Partners: Male     Birth control/protection: Other-See Comments     Comment: none       CURRENT MEDICATIONS  Home Medications    **Home medications have not yet been reviewed for this encounter**         ALLERGIES  No Known Allergies    PHYSICAL EXAM  VITAL SIGNS: /53   Pulse (!) 101   Temp 36.4 °C (97.5 °F) (Temporal)   Resp 15   Ht 1.626 m (5'  "4\")   Wt 80.1 kg (176 lb 9.4 oz)   SpO2 98%   BMI 30.31 kg/m²    In general the patient appears uncomfortable    Head is normocephalic atraumatic    ENT atraumatic    Cervical and thoracic spine has no midline tenderness or step-offs.  The patient's lumbar sacral spine does have tenderness in the low lumbar region.    Pulmonary the patient's lungs are clear to auscultation bilaterally with no pain with AP or lateral compression    Cardiovascular S1-S2 with a slightly tachycardic rate    GI abdomen soft    Skin no erythema nor induration    Extremities atraumatic    Neurologic examination GCS of 15      RADIOLOGY  I have independently interpreted the diagnostic imaging associated with this visit and am waiting the final reading from the radiologist.   My preliminary interpretation is as follows: CT scan of the lumbar sacral spine was reviewed and shows no acute fracture  Radiologist interpretation:   CT-LSPINE W/O PLUS RECONS   Final Result      1.  No evidence of fracture of the lumbar spine.      2.  Degenerative disc disease at L5-S1.            COURSE & MEDICAL DECISION MAKING  This a 28-year-old female who presents with severe low back pain after a fall.  CT scan was performed to rule out a compression fracture and this was negative.  Therefore I suspect the patient does have a significant contusion.  She continues to be neurologically intact on repeat exam.  She did receive Motrin and Percocet for pain control while in the emergency department.  I will discharge the patient home with a prescription for prednisone as well as Percocet.  She will take the Percocet only as needed and as directed.  I will perform a narcotic check.  The patient will return if she is not better in 72 hours and sooner if worse.      FINAL DIAGNOSIS  Lumbar sacral contusion    Disposition  Patient will be discharged in stable condition       Electronically signed by: Odilon Bello M.D., 9/12/2023 9:53 AM      "

## 2023-09-12 NOTE — DISCHARGE INSTRUCTIONS
Take the Percocet only as prescribed as needed for pain control.  Return if you do not have significant improvement in 48 to 72 hours.

## 2023-09-12 NOTE — ED NOTES
Med rec updated and complete, per pt   Allergies reviewed, per pt  Pt reports no vitamins or OTC's in the last 30 days or longer.  Pt reports no antibiotics in the last 30 days.

## 2023-09-12 NOTE — ED NOTES
Pt was re-evaluated by MD  cleared for d/c  dischg instructions given to pt  verbally understands  aware that Rx percocet and prednisone were sent to listed pharmacy   she is to  and take as directed  d/c'ed to home in NAD w/  driving her home

## 2023-12-21 ENCOUNTER — HOSPITAL ENCOUNTER (EMERGENCY)
Facility: MEDICAL CENTER | Age: 28
End: 2023-12-21
Attending: EMERGENCY MEDICINE
Payer: COMMERCIAL

## 2023-12-21 VITALS
WEIGHT: 180.34 LBS | HEART RATE: 83 BPM | DIASTOLIC BLOOD PRESSURE: 74 MMHG | HEIGHT: 64 IN | BODY MASS INDEX: 30.79 KG/M2 | TEMPERATURE: 98 F | SYSTOLIC BLOOD PRESSURE: 109 MMHG | RESPIRATION RATE: 16 BRPM | OXYGEN SATURATION: 98 %

## 2023-12-21 DIAGNOSIS — M62.830 MUSCLE SPASM OF BACK: ICD-10-CM

## 2023-12-21 DIAGNOSIS — M54.50 LUMBAR BACK PAIN: ICD-10-CM

## 2023-12-21 PROCEDURE — 700102 HCHG RX REV CODE 250 W/ 637 OVERRIDE(OP): Performed by: EMERGENCY MEDICINE

## 2023-12-21 PROCEDURE — 99283 EMERGENCY DEPT VISIT LOW MDM: CPT

## 2023-12-21 PROCEDURE — A9270 NON-COVERED ITEM OR SERVICE: HCPCS | Performed by: EMERGENCY MEDICINE

## 2023-12-21 RX ORDER — HYDROCODONE BITARTRATE AND ACETAMINOPHEN 5; 325 MG/1; MG/1
1 TABLET ORAL ONCE
Status: COMPLETED | OUTPATIENT
Start: 2023-12-21 | End: 2023-12-21

## 2023-12-21 RX ORDER — IBUPROFEN 600 MG/1
600 TABLET ORAL ONCE
Status: COMPLETED | OUTPATIENT
Start: 2023-12-21 | End: 2023-12-21

## 2023-12-21 RX ORDER — MORPHINE SULFATE 15 MG/1
7.5 TABLET ORAL EVERY 6 HOURS PRN
Qty: 10 TABLET | Refills: 0 | Status: SHIPPED | OUTPATIENT
Start: 2023-12-21 | End: 2023-12-27

## 2023-12-21 RX ORDER — CYCLOBENZAPRINE HCL 10 MG
10 TABLET ORAL ONCE
Status: COMPLETED | OUTPATIENT
Start: 2023-12-21 | End: 2023-12-21

## 2023-12-21 RX ORDER — CYCLOBENZAPRINE HCL 10 MG
10 TABLET ORAL 3 TIMES DAILY PRN
Qty: 30 TABLET | Refills: 0 | Status: SHIPPED | OUTPATIENT
Start: 2023-12-21 | End: 2024-01-23

## 2023-12-21 RX ADMIN — IBUPROFEN 600 MG: 600 TABLET, FILM COATED ORAL at 10:08

## 2023-12-21 RX ADMIN — HYDROCODONE BITARTRATE AND ACETAMINOPHEN 1 TABLET: 5; 325 TABLET ORAL at 10:08

## 2023-12-21 RX ADMIN — CYCLOBENZAPRINE 10 MG: 10 TABLET, FILM COATED ORAL at 10:08

## 2023-12-21 ASSESSMENT — FIBROSIS 4 INDEX: FIB4 SCORE: 0.36

## 2023-12-21 NOTE — ED PROVIDER NOTES
ER Provider Note    Scribed for Alli Copeland M.D. by Claude Bear. 12/21/2023  9:23 AM    Primary Care Provider: KRISTA Lamar    CHIEF COMPLAINT  Chief Complaint   Patient presents with    Low Back Pain     EXTERNAL RECORDS REVIEWED  Outpatient Notes Patient seen after a fall in September. CT of lumbar showed degeneration between L5-S1. No fracture.      HPI/ROS  LIMITATION TO HISTORY   Select: : None  OUTSIDE HISTORIAN(S):  None.     Mary Carmen Richard is a 28 y.o. female who presents to the ED for evaluation of lower back pain onset 3 days ago. The patient states she woke up and couldn't walk anymore, prompting her to present to the ED. The patient states the pain is similar to when she broke her tailbone previously. The patient reports it hurts to breath, laugh, or cough. The patient denies fall, lift, or twist. Patient also denies fever, chills, nausea, vomiting, dysuria, pregnancy, history of IV drug use, pain shooting down her legs, or numbness when she wipes. The patient reports she has taken Ibuprofen and used a heating pad, noting her last dose was at 12AM. The patient states the pain is only alleviated isabella she sits straight up. The patient reports the lower back pain is exacerbated when she used ice on the affected area.       PAST MEDICAL HISTORY  Past Medical History:   Diagnosis Date    Anxiety     Asthma     history of asthma; childhood asthma    Depression     history of    Ovarian cyst        SURGICAL HISTORY  Past Surgical History:   Procedure Laterality Date    GASTRIC SLEEVE LAPAROSCOPY  2/13/2018    Procedure: GASTRIC SLEEVE LAPAROSCOPY;  Surgeon: Quintin Nguyen M.D.;  Location: SURGERY [facility];  Service: General    PRIMARY C SECTION  3/28/2015    Performed by Lucy Valdez M.D. at [facility]OR AND DELIVERY    CERVICAL CERCLAGE  2/20/2015    Performed by Quinn Grimaldo M.D. at [facility]    TONSILLECTOMY  2000    OTHER      wisdom teeth       FAMILY  "HISTORY  Family History   Problem Relation Age of Onset    No Known Problems Daughter     No Known Problems Daughter     Diabetes Father     Diabetes Paternal Uncle     Cancer Maternal Grandfather        SOCIAL HISTORY   reports that she has never smoked. She has never used smokeless tobacco. She reports that she does not drink alcohol and does not use drugs.    CURRENT MEDICATIONS  Discharge Medication List as of 12/21/2023 10:01 AM        CONTINUE these medications which have NOT CHANGED    Details   PARAGARD INTRAUTERINE COPPER IUD 1 Application  by Intrauterine route see administration instructions. Every 10 years, last placed 2021, Historical Med             ALLERGIES  Patient has no known allergies.    PHYSICAL EXAM  /74   Pulse 83   Temp 36.7 °C (98 °F)   Resp 16   Ht 1.626 m (5' 4\")   Wt 81.8 kg (180 lb 5.4 oz)   SpO2 98%   BMI 30.95 kg/m²   Constitutional: Well developed, Well nourished, mild distress.   HENT: Normocephalic, Atraumatic.   Eyes: Conjunctiva normal, No discharge.   Cardiovascular: Normal heart rate, Normal rhythm, No murmurs, equal pulses.   Pulmonary: Normal breath sounds, No respiratory distress, No wheezing, No rales, No rhonchi.  Abdomen:Soft, No tenderness, No masses, no rebound, no guarding.   Back: No CVA tenderness.   Musculoskeletal: No major deformities noted, Tenderness about L4 with paraspinal muscle tenderness. Negative straight-leg raise test bilaterally.   Skin: Warm, Dry, No erythema, No rash.   Neurologic: Alert & oriented x 3, Normal motor function,  No focal deficits noted. 5/5 strength bilaterally lower extremity.   Psychiatric: Affect normal, Judgment normal, Mood normal.       COURSE & MEDICAL DECISION MAKING     ED Observation Status? No; Patient does not meet criteria for ED Observation.     INITIAL ASSESSMENT, COURSE AND PLAN  Care Narrative:     9:23 AM - Patient seen and examined at bedside. Discussed plan of care, including discharge home with a " prescription for muscle relaxers and pain medication to alleviate the lower back pain. Patient will be medicated here with Norco 325 mg PO, Flexeril 10 mg PO, and Motrin 600 mg PO. I informed the patient I will be prescribing her Flexeril to take 3x a day as needed for moderate pain or muscle spasms. Additionally, I informed the patient I will be prescribing morphine to take every 6 hours as needed for severe pain for up to 6 days. Patient was given the opportunity for questions. I addressed all questions or concerns at this time and they verbalize agreement to the plan of care.      PROBLEM LIST  Problem 1 back pain at this point time I think the patient's back pain is most likely secondary to muscle skeletal strain and muscle spasm.  Patient does not have any loss of bowel or bladder function does not have a history of IV drug use, no fever and no specific vertebral point tenderness I do not think she has epidural abscess or cauda equina.  Patient did not have any trauma and her pain has been less than 6 weeks I do not think she needs imaging.    DISPOSITION AND DISCUSSIONS  I have discussed management of the patient with the following physicians and RADHA's:  None.    Discussion of management with other QHP or appropriate source(s): None     Escalation of care considered, and ultimately not performed: diagnostic imaging.    Barriers to care at this time, including but not limited to:  None .     Decision tools and prescription drugs considered including, but not limited to: Pain Medications Norco, Flexeril .    I reviewed prescription monitoring program for patient's narcotic use before prescribing a scheduled drug.The patient will not drink alcohol nor drive with prescribed medications.The patient will return for new or worsening symptoms and is stable at the time of discharge.    The patient is referred to a primary physician for blood pressure management, diabetic screening, and for all other preventative health  concerns.    In prescribing controlled substances to this patient, I certify that I have obtained and reviewed the medical history of Mary Carmen Richard. I have also made a good chet effort to obtain applicable records from other providers who have treated the patient and records did not demonstrate any increased risk of substance abuse that would prevent me from prescribing controlled substances.     I have conducted a physical exam and documented it. I have reviewed Ms. Richard’s prescription history as maintained by the [place] Prescription Monitoring Program.     I have assessed the patient’s risk for abuse, dependency, and addiction using the validated Opioid Risk Tool available at https://www.mdcalc.com/bswdxf-rjyu-grfb-ort-narcotic-abuse.     Given the above, I believe the benefits of controlled substance therapy outweigh the risks. The reasons for prescribing controlled substances include non-narcotic, oral analgesic alternatives have been inadequate for pain control. Accordingly, I have discussed the risk and benefits, treatment plan, and alternative therapies with the patient.       DISPOSITION:  Patient will be discharged home in stable condition.    FOLLOW UP:  No follow-up provider specified.    OUTPATIENT MEDICATIONS:  Discharge Medication List as of 12/21/2023 10:01 AM        START taking these medications    Details   cyclobenzaprine (FLEXERIL) 10 mg Tab Take 1 Tablet by mouth 3 times a day as needed for Moderate Pain or Muscle Spasms., Disp-30 Tablet, R-0, Normal      morphine (MS IR) 15 MG tablet Take 0.5 Tablets by mouth every 6 hours as needed for Severe Pain for up to 6 days., Disp-10 Tablet, R-0, Normal               FINAL DIAGNOSIS  1. Lumbar back pain    2. Muscle spasm of back        IClaude (Atif), am scribing for, and in the presence of, GAGE Matta*.    Electronically signed by: Claude Rojas), 12/21/2023    Alli LIPSCOMB M.* personally performed  the services described in this documentation, as scribed by Claude Bear in my presence, and it is both accurate and complete.       The note accurately reflects work and decisions made by me.  Alli Copeland M.D.  12/21/2023  4:47 PM

## 2023-12-21 NOTE — ED NOTES
Patient verbalized understanding to plan of care and discharge information. Patient reports pain 8/10. Patient in stable condition. Patient wheeled out of ED to person vehicle with friend.

## 2023-12-21 NOTE — ED TRIAGE NOTES
Pt presents with friend from home for low back pain from mid back that goes to left buttock. Denies trauma. Began upon waking 3 days ago. Movement makes it worse. Sitting straight up makes it better. Denies numbness/tinging. Denies retention/incontinence. A&Ox4 and ambulatory.  Tried ibuprofen without relief.

## 2023-12-21 NOTE — DISCHARGE INSTRUCTIONS
Ice to your back 20 minutes on, 20 minute off as often as possible. No drinking or driving on Morphine or Flexeril. 600 mg of Ibuprofen every 6 - 8 hours as needed. Take with food. Return if you have increasing pain, weakness, fever or loss of bowel or bladder function.

## 2024-01-23 ENCOUNTER — APPOINTMENT (OUTPATIENT)
Dept: RADIOLOGY | Facility: MEDICAL CENTER | Age: 29
End: 2024-01-23
Attending: EMERGENCY MEDICINE
Payer: COMMERCIAL

## 2024-01-23 ENCOUNTER — HOSPITAL ENCOUNTER (EMERGENCY)
Facility: MEDICAL CENTER | Age: 29
End: 2024-01-23
Attending: EMERGENCY MEDICINE
Payer: COMMERCIAL

## 2024-01-23 VITALS
HEART RATE: 84 BPM | SYSTOLIC BLOOD PRESSURE: 105 MMHG | WEIGHT: 181.22 LBS | RESPIRATION RATE: 18 BRPM | HEIGHT: 64 IN | BODY MASS INDEX: 30.94 KG/M2 | DIASTOLIC BLOOD PRESSURE: 70 MMHG | OXYGEN SATURATION: 100 % | TEMPERATURE: 98.3 F

## 2024-01-23 DIAGNOSIS — S39.012A STRAIN OF LUMBAR REGION, INITIAL ENCOUNTER: ICD-10-CM

## 2024-01-23 PROCEDURE — 700102 HCHG RX REV CODE 250 W/ 637 OVERRIDE(OP): Performed by: EMERGENCY MEDICINE

## 2024-01-23 PROCEDURE — A9270 NON-COVERED ITEM OR SERVICE: HCPCS | Performed by: EMERGENCY MEDICINE

## 2024-01-23 PROCEDURE — 72100 X-RAY EXAM L-S SPINE 2/3 VWS: CPT

## 2024-01-23 PROCEDURE — 99283 EMERGENCY DEPT VISIT LOW MDM: CPT

## 2024-01-23 RX ORDER — CYCLOBENZAPRINE HCL 10 MG
10 TABLET ORAL 3 TIMES DAILY PRN
Qty: 20 TABLET | Refills: 0 | Status: SHIPPED | OUTPATIENT
Start: 2024-01-23 | End: 2024-01-23

## 2024-01-23 RX ORDER — NAPROXEN 500 MG/1
500 TABLET ORAL 2 TIMES DAILY WITH MEALS
Qty: 20 TABLET | Refills: 0 | Status: SHIPPED | OUTPATIENT
Start: 2024-01-23

## 2024-01-23 RX ORDER — METHOCARBAMOL 750 MG/1
750 TABLET, FILM COATED ORAL 4 TIMES DAILY
Qty: 12 TABLET | Refills: 0 | Status: SHIPPED | OUTPATIENT
Start: 2024-01-23 | End: 2024-01-26

## 2024-01-23 RX ORDER — NAPROXEN 250 MG/1
500 TABLET ORAL ONCE
Status: COMPLETED | OUTPATIENT
Start: 2024-01-23 | End: 2024-01-23

## 2024-01-23 RX ADMIN — NAPROXEN 500 MG: 250 TABLET ORAL at 17:41

## 2024-01-23 ASSESSMENT — FIBROSIS 4 INDEX: FIB4 SCORE: 0.37
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
## 2024-06-30 ENCOUNTER — APPOINTMENT (OUTPATIENT)
Dept: RADIOLOGY | Facility: MEDICAL CENTER | Age: 29
End: 2024-06-30
Attending: STUDENT IN AN ORGANIZED HEALTH CARE EDUCATION/TRAINING PROGRAM
Payer: COMMERCIAL

## 2024-06-30 ENCOUNTER — HOSPITAL ENCOUNTER (EMERGENCY)
Facility: MEDICAL CENTER | Age: 29
End: 2024-06-30
Attending: STUDENT IN AN ORGANIZED HEALTH CARE EDUCATION/TRAINING PROGRAM
Payer: COMMERCIAL

## 2024-06-30 VITALS
BODY MASS INDEX: 29.24 KG/M2 | HEART RATE: 65 BPM | WEIGHT: 171.3 LBS | DIASTOLIC BLOOD PRESSURE: 61 MMHG | RESPIRATION RATE: 16 BRPM | OXYGEN SATURATION: 100 % | HEIGHT: 64 IN | SYSTOLIC BLOOD PRESSURE: 118 MMHG | TEMPERATURE: 97.6 F

## 2024-06-30 DIAGNOSIS — S51.012A LACERATION OF LEFT ELBOW, INITIAL ENCOUNTER: ICD-10-CM

## 2024-06-30 PROCEDURE — 700102 HCHG RX REV CODE 250 W/ 637 OVERRIDE(OP): Performed by: STUDENT IN AN ORGANIZED HEALTH CARE EDUCATION/TRAINING PROGRAM

## 2024-06-30 PROCEDURE — A9270 NON-COVERED ITEM OR SERVICE: HCPCS | Performed by: STUDENT IN AN ORGANIZED HEALTH CARE EDUCATION/TRAINING PROGRAM

## 2024-06-30 PROCEDURE — 73070 X-RAY EXAM OF ELBOW: CPT | Mod: LT

## 2024-06-30 PROCEDURE — 90471 IMMUNIZATION ADMIN: CPT

## 2024-06-30 PROCEDURE — 304217 HCHG IRRIGATION SYSTEM

## 2024-06-30 PROCEDURE — 700101 HCHG RX REV CODE 250: Performed by: STUDENT IN AN ORGANIZED HEALTH CARE EDUCATION/TRAINING PROGRAM

## 2024-06-30 PROCEDURE — 90715 TDAP VACCINE 7 YRS/> IM: CPT | Performed by: STUDENT IN AN ORGANIZED HEALTH CARE EDUCATION/TRAINING PROGRAM

## 2024-06-30 PROCEDURE — 303747 HCHG EXTRA SUTURE

## 2024-06-30 PROCEDURE — 700111 HCHG RX REV CODE 636 W/ 250 OVERRIDE (IP): Performed by: STUDENT IN AN ORGANIZED HEALTH CARE EDUCATION/TRAINING PROGRAM

## 2024-06-30 PROCEDURE — 304999 HCHG REPAIR-SIMPLE/INTERMED LEVEL 1

## 2024-06-30 PROCEDURE — 99284 EMERGENCY DEPT VISIT MOD MDM: CPT

## 2024-06-30 RX ORDER — HYDROCODONE BITARTRATE AND ACETAMINOPHEN 5; 325 MG/1; MG/1
1 TABLET ORAL ONCE
Status: COMPLETED | OUTPATIENT
Start: 2024-06-30 | End: 2024-06-30

## 2024-06-30 RX ORDER — ACETAMINOPHEN 325 MG/1
650 TABLET ORAL ONCE
Status: COMPLETED | OUTPATIENT
Start: 2024-06-30 | End: 2024-06-30

## 2024-06-30 RX ADMIN — ACETAMINOPHEN 650 MG: 325 TABLET ORAL at 17:55

## 2024-06-30 RX ADMIN — CLOSTRIDIUM TETANI TOXOID ANTIGEN (FORMALDEHYDE INACTIVATED), CORYNEBACTERIUM DIPHTHERIAE TOXOID ANTIGEN (FORMALDEHYDE INACTIVATED), BORDETELLA PERTUSSIS TOXOID ANTIGEN (GLUTARALDEHYDE INACTIVATED), BORDETELLA PERTUSSIS FILAMENTOUS HEMAGGLUTININ ANTIGEN (FORMALDEHYDE INACTIVATED), BORDETELLA PERTUSSIS PERTACTIN ANTIGEN, AND BORDETELLA PERTUSSIS FIMBRIAE 2/3 ANTIGEN 0.5 ML: 5; 2; 2.5; 5; 3; 5 INJECTION, SUSPENSION INTRAMUSCULAR at 17:55

## 2024-06-30 RX ADMIN — HYDROCODONE BITARTRATE AND ACETAMINOPHEN 1 TABLET: 5; 325 TABLET ORAL at 17:54

## 2024-06-30 RX ADMIN — LIDOCAINE HYDROCHLORIDE 10 ML: 10 INJECTION, SOLUTION INFILTRATION; PERINEURAL at 18:00

## 2024-06-30 ASSESSMENT — PAIN DESCRIPTION - PAIN TYPE: TYPE: ACUTE PAIN

## 2024-06-30 ASSESSMENT — FIBROSIS 4 INDEX: FIB4 SCORE: 0.37

## 2025-06-01 NOTE — ED PROVIDER NOTES
"ED Provider Note    CHIEF COMPLAINT  Chief Complaint   Patient presents with    Nausea/Vomiting/Diarrhea     Started 4-days ago, Pt stated that she hasn't been able to keep anything down; Pt also stated that her stool was dark red in appears - Pt did state that she took some Pepto however it was after she noted the dark red appearance to her stool, 8-episodes of diarrhea today;    Syncope     Pt stated that she \"passed out\" an unknown number of times over the last 5-days;       EXTERNAL RECORDS REVIEWED  Outpatient Notes previous ER visit in October for abdominal pains, nausea vomiting diarrhea    HPI/ROS  LIMITATION TO HISTORY   Select: : None  OUTSIDE HISTORIAN(S):  None    Mary Carmen Richard is a 28 y.o. female who presents to the ED with syncope, nausea vomiting diarrhea.  The patient started approximately week ago with some fatigue, lightheadedness when she stands up, syncopal episodes, she has preceding symptoms.  No chest pains, shortness of breath.  Patient then states over the last 3 to 4 days the patient has been having nausea vomiting diarrhea.  The patient states that her diarrhea is dark red and coffee-ground.  The patient has been taking some Pepto.  The patient is complaining of some abdominal pain, periumbilical area, dull aching pain.  The patient denies any fevers, the patient does have a history of a gastric sleeve.    PAST MEDICAL HISTORY   has a past medical history of Anxiety, Asthma, Depression, and Ovarian cyst.    SURGICAL HISTORY   has a past surgical history that includes tonsillectomy (2000); cervical cerclage (2/20/2015); other; primary c section (3/28/2015); and gastric sleeve laparoscopy (2/13/2018).    FAMILY HISTORY  Family History   Problem Relation Age of Onset    No Known Problems Daughter     No Known Problems Daughter     Diabetes Father     Diabetes Paternal Uncle     Cancer Maternal Grandfather        SOCIAL HISTORY  Social History     Tobacco Use    Smoking status: Never " Received prescription renewal request on:    Medication passed protocol.     Medication: lamictal 200mg sertraline 100mg  passed protocol.   Last office visit date: 3/27/2024   Next appointment scheduled?: Yes     Verified dosage(s) against: Prescriber's last note and Medication list/Rx history     Controlled?  No     Prescriber's Follow Up Recommendation: 6months    (If it is past recommended follow up window, ROUTE TO PRESCRIBER)  No Show/Late Cancels in Last 12 Months: 8/8/2024      Next Visit Date: 7/9/2025  []  Schedule ticket placed.    Preferred Pharmacy: [id number] - [place], [place] - [id number]   "   Smokeless tobacco: Never    Tobacco comments:     vape    Vaping Use    Vaping Use: Every day   Substance and Sexual Activity    Alcohol use: No    Drug use: No    Sexual activity: Yes     Partners: Male     Birth control/protection: Other-See Comments     Comment: none       CURRENT MEDICATIONS  Home Medications       Reviewed by Zion Lay (Pharmacy Tech) on 03/11/23 at 1035  Med List Status: Complete     Medication Last Dose Status   bismuth subsalicylate (PEPTO-BISMOL) 262 MG/15ML Suspension PRN Active   multivitamin Tab FEW DAYS AGO Active   ondansetron (ZOFRAN ODT) 4 MG TABLET DISPERSIBLE 3/11/2023 Active   PARAGARD INTRAUTERINE COPPER IUD IN PLACE Active                    ALLERGIES  No Known Allergies    PHYSICAL EXAM  VITAL SIGNS: /65   Pulse 63   Temp 36.9 °C (98.4 °F) (Temporal)   Resp 16   Ht 1.626 m (5' 4\")   Wt 65.5 kg (144 lb 6.4 oz)   SpO2 100%   BMI 24.79 kg/m²    Well-developed well-nourished 28-year-old in mild to moderate distress  Dry mucous membranes  Borderline tachycardia, regular rate and rhythm  Lower auscultation bilaterally  Mild periumbilical tenderness palpation, no rebound    DIAGNOSTIC STUDIES / PROCEDURES    LABS  Results for orders placed or performed during the hospital encounter of 03/11/23   CBC WITH DIFFERENTIAL   Result Value Ref Range    WBC 9.2 4.8 - 10.8 K/uL    RBC 4.96 4.20 - 5.40 M/uL    Hemoglobin 15.3 12.0 - 16.0 g/dL    Hematocrit 45.2 37.0 - 47.0 %    MCV 91.1 81.4 - 97.8 fL    MCH 30.8 27.0 - 33.0 pg    MCHC 33.8 33.6 - 35.0 g/dL    RDW 38.4 35.9 - 50.0 fL    Platelet Count 294 164 - 446 K/uL    MPV 8.4 (L) 9.0 - 12.9 fL    Neutrophils-Polys 78.50 (H) 44.00 - 72.00 %    Lymphocytes 16.80 (L) 22.00 - 41.00 %    Monocytes 4.00 0.00 - 13.40 %    Eosinophils 0.00 0.00 - 6.90 %    Basophils 0.30 0.00 - 1.80 %    Immature Granulocytes 0.40 0.00 - 0.90 %    Nucleated RBC 0.00 /100 WBC    Neutrophils (Absolute) 7.17 (H) 2.00 - 7.15 K/uL    Lymphs " (Absolute) 1.54 1.00 - 4.80 K/uL    Monos (Absolute) 0.37 0.00 - 0.85 K/uL    Eos (Absolute) 0.00 0.00 - 0.51 K/uL    Baso (Absolute) 0.03 0.00 - 0.12 K/uL    Immature Granulocytes (abs) 0.04 0.00 - 0.11 K/uL    NRBC (Absolute) 0.00 K/uL   COMP METABOLIC PANEL   Result Value Ref Range    Sodium 138 135 - 145 mmol/L    Potassium 3.8 3.6 - 5.5 mmol/L    Chloride 102 96 - 112 mmol/L    Co2 23 20 - 33 mmol/L    Anion Gap 13.0 7.0 - 16.0    Glucose 87 65 - 99 mg/dL    Bun 16 8 - 22 mg/dL    Creatinine 0.83 0.50 - 1.40 mg/dL    Calcium 9.0 8.4 - 10.2 mg/dL    AST(SGOT) 16 12 - 45 U/L    ALT(SGPT) 17 2 - 50 U/L    Alkaline Phosphatase 70 30 - 99 U/L    Total Bilirubin 0.7 0.1 - 1.5 mg/dL    Albumin 4.3 3.2 - 4.9 g/dL    Total Protein 7.5 6.0 - 8.2 g/dL    Globulin 3.2 1.9 - 3.5 g/dL    A-G Ratio 1.3 g/dL   LIPASE   Result Value Ref Range    Lipase 48 7 - 58 U/L   TROPONIN   Result Value Ref Range    Troponin T <6 6 - 19 ng/L   HCG QUAL SERUM   Result Value Ref Range    Beta-Hcg Qualitative Serum Negative Negative   CORRECTED CALCIUM   Result Value Ref Range    Correct Calcium 8.8 8.5 - 10.5 mg/dL   ESTIMATED GFR   Result Value Ref Range    GFR (CKD-EPI) 99 >60 mL/min/1.73 m 2   TSH WITH REFLEX TO FT4   Result Value Ref Range    TSH 0.476 0.380 - 5.330 uIU/mL   EKG (NOW)   Result Value Ref Range    Report       [facility] Emergency Dept.    Test Date:  2023  Pt Name:    MARI HOLDER              Department: [facility]  MRN:        [id number]                      [facility]   Gender:     Female                       Technician: AISLINN  :        [id number]                   Requested By:LILIAN PEACOCK  Order #:    [id number]                    Reading MD: LILIAN PEACOCK MD    Measurements  Intervals                                Axis  Rate:       92                           P:          89  NM:         157                          QRS:        94  QRSD:       90                            T:          -51  QT:         350  QTc:        433    Interpretive Statements  Sinus rhythm  Probable left atrial enlargement  Borderline right axis deviation  Nonspecific T abnormalities, inferior leads  Compared to ECG 11/05/2018 11:01:01  T-wave abnormality now present  Sinus bradycardia no longer present  Electronically Signed On 3- 12:26:46 PST by LILIAN PEACOCK MD          RADIOLOGY  I have independently interpreted the diagnostic imaging associated with this visit and am waiting the final reading from the radiologist.   My preliminary interpretation is as follows: Gallbladder wall thickening, no obstruction, 11:05 AM    Radiologist interpretation:   CT-ABDOMEN-PELVIS WITH   Final Result      1.  Unremarkable CT scan of the abdomen and pelvis.            COURSE & MEDICAL DECISION MAKING    ED Observation Status? Yes; I am placing the patient in to an observation status due to a diagnostic uncertainty as well as therapeutic intensity. Patient placed in observation status at 10:31 AM, 3/11/2023.     Observation plan is as follows: Therapeutics, CT scan, IV fluids    Upon Reevaluation, the patient's condition has: Improved; and will be discharged.    Patient discharged from ED Observation status at 1230 (Time) 3/11/202 (Date).     INITIAL ASSESSMENT, COURSE AND PLAN  Care Narrative: Patient is coming in with nausea vomiting diarrhea, she states that is black and bloody.  We will check a CBC, basic laboratory test, give the patient IV fluids for dry mucous membranes.  Patient is also saying that she is having a syncopal episode.  I believe this is likely orthostatic hypotension.  We will check an EKG, troponin.  Fluid resuscitate the patient.    CT scans are unremarkable, fluid resuscitated the patient, patient was still complaining of some head pain, mild tenderness in the right occipital area where she hit her head, no indication for a CT scan of the head.  Laboratory tests are unremarkable, the  patient is not anemic.  I believe the patient is having syncope secondary to orthostatic syncope.  Reevaluation the patient is still slightly in pain, give the patient Reglan and Benadryl with improvement the patient's symptoms.  We will give the patient a prescription for Phenergan and Phenergan suppositories, also Bentyl for her abdominal pain.  The patient return in the next 1 to 2 days if not improved, return sooner with worsening symptoms.     The patient will return for new or worsening symptoms and is stable at the time of discharge.    The patient is referred to a primary physician for blood pressure management, diabetic screening, and for all other preventative health concerns.        DISPOSITION:  Patient will be discharged home in stable condition.    FOLLOW UP:  [facility], Emergency Dept  [address]  [place] [id number] [phone]    If symptoms worsen      OUTPATIENT MEDICATIONS:  Discharge Medication List as of 3/11/2023 12:42 PM        START taking these medications    Details   promethazine (PHENERGAN) 25 MG Suppos Insert 1 Suppository into the rectum every 6 hours as needed for Nausea/Vomiting., Disp-10 Suppository, R-0, Normal      promethazine (PHENERGAN) 25 MG Tab Take 1 Tablet by mouth every 6 hours as needed for Nausea/Vomiting., Disp-20 Tablet, R-0, Normal      dicyclomine (BENTYL) 20 MG Tab Take 1 Tablet by mouth every 6 hours as needed (abdominal pain)., Disp-20 Tablet, R-0, Normal             DISPOSITION AND DISCUSSIONS  Escalation of care considered, and ultimately not performed:acute inpatient care management, however at this time, the patient is most appropriate for outpatient management    Decision tools and prescription drugs considered including, but not limited to: Pain Medications   .    FINAL DIAGNOSIS  1. Nausea vomiting and diarrhea    2. Periumbilical abdominal pain    3. Orthostatic syncope           Electronically signed by: Sandip  NICK Joyce, 3/11/2023 10:28 AM

## (undated) DEVICE — RELOAD WITH GRIPPING SURFACE TECHNOLOGY GOLD 60MM (12EA/BX)

## (undated) DEVICE — MASK ANESTHESIA ADULT  - (100/CA)

## (undated) DEVICE — TROCAR 5X100 NON BLADED Z-TH - READ KII (6/BX)

## (undated) DEVICE — TROCAR SEPARATOR 15MMZTHREAD - (6/BX)

## (undated) DEVICE — SPONGE GAUZESTER. 2X2 4-PL - (2/PK 50PK/BX 30BX/CS)

## (undated) DEVICE — CHLORAPREP 26 ML APPLICATOR - ORANGE TINT(25/CA)

## (undated) DEVICE — GLOVE BIOGEL ECLIPSE PF LATEX SIZE 8.0  (50PR/BX)

## (undated) DEVICE — GLOVE BIOGEL SZ 6 PF LATEX - (50EA/BX 4BX/CA)

## (undated) DEVICE — DRAPESURG STERI-DRAPE LONG - (10/BX 4BX/CA)

## (undated) DEVICE — STAPLER POWERED 60MM (3EA/BX)

## (undated) DEVICE — TROCAR Z THREAD12MM OPTICAL - NON BLADED (6/BX)

## (undated) DEVICE — CANNULA W/SEAL 5X100 Z-THRE - ADED KII (12/BX)

## (undated) DEVICE — GLOVE BIOGEL INDICATOR SZ 7SURGICAL PF LTX - (50/BX 4BX/CA)

## (undated) DEVICE — GLOVE BIOGEL INDICATOR SZ 6.5 SURGICAL PF LTX - (50PR/BX 4BX/CA)

## (undated) DEVICE — SUTURE 0 VICRYL PLUS CT-1 - 36 INCH (36/BX)

## (undated) DEVICE — GLOVE BIOGEL SZ 7 SURGICAL PF LTX - (50PR/BX 4BX/CA)

## (undated) DEVICE — HEAD HOLDER JUNIOR/ADULT

## (undated) DEVICE — APPLICATOR COTTON TIP 6 IN - STERILE (2EA/PK 100PK/BX)

## (undated) DEVICE — GLOVE BIOGEL SZ 8 SURGICAL PF LTX - (50PR/BX 4BX/CA)

## (undated) DEVICE — SET SUCTION/IRRIGATION WITH DISPOSABLE TIP (6/CA )PART #0250-070-520 IS A SUB

## (undated) DEVICE — GOWN SURGEONS LARGE - (32/CA)

## (undated) DEVICE — GLOVE BIOGEL ECLIPSE PF LATEX SIZE 7.5

## (undated) DEVICE — PROTECTOR ULNA NERVE - (36PR/CA)

## (undated) DEVICE — BAG, SPONGE COUNT 50600

## (undated) DEVICE — SET LEADWIRE 5 LEAD BEDSIDE DISPOSABLE ECG (1SET OF 5/EA)

## (undated) DEVICE — DRESSING TRANSPARENT FILM TEGADERM 2.375 X 2.75"  (100EA/BX)"

## (undated) DEVICE — HUMID-VENT HEAT AND MOISTURE EXCHANGE- (50/BX)

## (undated) DEVICE — KIT ROOM DECONTAMINATION

## (undated) DEVICE — SET EXTENSION WITH 2 PORTS (48EA/CA) ***PART #2C8610 IS A SUBSTITUTE*****

## (undated) DEVICE — GLOVE BIOGEL PI INDICATOR SZ 7.5 SURGICAL PF LF -(50/BX 4BX/CA)

## (undated) DEVICE — SEALER ARTICULATING TISSUE NSEAL (6/BX)

## (undated) DEVICE — SUTURE 2-0 20CM STRATAFIX SPIRAL SH NEEDLE (12/BX)

## (undated) DEVICE — SODIUM CHL IRRIGATION 0.9% 1000ML (12EA/CA)

## (undated) DEVICE — CANISTER SUCTION 3000ML MECHANICAL FILTER AUTO SHUTOFF MEDI-VAC NONSTERILE LF DISP  (40EA/CA)

## (undated) DEVICE — ELECTRODE 850 FOAM ADHESIVE - HYDROGEL RADIOTRNSPRNT (50/PK)

## (undated) DEVICE — NEEDLE SPINAL NON-SAFETY 20 GA X 3 IN (25/BX)

## (undated) DEVICE — RELOAD WITH GRIPPING SURFACE TECHNOLOGY BLUE 60MM (12EA/BX)

## (undated) DEVICE — PACK GASTRIC BANDING OR - (1/CA)

## (undated) DEVICE — TUBING CLEARLINK DUO-VENT - C-FLO (48EA/CA)

## (undated) DEVICE — RELOAD WITH GRIPPING SURGACE TECHNOLOGY GREEN 60MM (12EA/BX)

## (undated) DEVICE — ELECTRODE DUAL RETURN W/ CORD - (50/PK)

## (undated) DEVICE — SUTURE 4-0 VICRYL PLUS FS-2 - 27 INCH (36/BX)

## (undated) DEVICE — WATER IRRIGATION STERILE 1000ML (12EA/CA)

## (undated) DEVICE — GLOVE BIOGEL SZ 6.5 SURGICAL PF LTX (50PR/BX 4BX/CA)

## (undated) DEVICE — SUCTION INSTRUMENT YANKAUER BULBOUS TIP W/O VENT (50EA/CA)

## (undated) DEVICE — SUTURE GENERAL

## (undated) DEVICE — TUBE E-T HI-LO CUFF 6.5MM (10EA/BX)

## (undated) DEVICE — ELECTRODE 5MM LHK LAPSCP STERILE DISP- MEGADYNE  (5/CA)

## (undated) DEVICE — GOWN WARMING STANDARD FLEX - (30/CA)

## (undated) DEVICE — LACTATED RINGERS INJ 1000 ML - (14EA/CA 60CA/PF)

## (undated) DEVICE — GLOVE BIOGEL SZ 7.5 SURGICAL PF LTX - (50PR/BX 4BX/CA)

## (undated) DEVICE — KIT ANESTHESIA W/CIRCUIT & 3/LT BAG W/FILTER (20EA/CA)

## (undated) DEVICE — SPECIMEN BAG ENDOCATCH II15MM 15MM SPECIMEN RETRIEVAL (3EA/CA)

## (undated) DEVICE — SENSOR SPO2 NEO LNCS ADHESIVE (20/BX) SEE USER NOTES